# Patient Record
Sex: FEMALE | Race: WHITE | NOT HISPANIC OR LATINO | Employment: OTHER | URBAN - METROPOLITAN AREA
[De-identification: names, ages, dates, MRNs, and addresses within clinical notes are randomized per-mention and may not be internally consistent; named-entity substitution may affect disease eponyms.]

---

## 2016-01-12 LAB — CRC RECOMMENDATION EXT: NORMAL

## 2020-06-23 PROBLEM — E53.8 B12 DEFICIENCY: Status: ACTIVE | Noted: 2020-06-23

## 2020-06-23 PROBLEM — E78.2 MIXED DIABETIC HYPERLIPIDEMIA ASSOCIATED WITH TYPE 2 DIABETES MELLITUS: Status: ACTIVE | Noted: 2020-06-23

## 2020-06-23 PROBLEM — E11.69 MIXED DIABETIC HYPERLIPIDEMIA ASSOCIATED WITH TYPE 2 DIABETES MELLITUS: Status: ACTIVE | Noted: 2020-06-23

## 2020-06-23 PROBLEM — Z79.4 TYPE 2 DIABETES MELLITUS WITH HYPERGLYCEMIA, WITH LONG-TERM CURRENT USE OF INSULIN: Status: ACTIVE | Noted: 2020-06-23

## 2020-06-23 PROBLEM — E55.9 VITAMIN D DEFICIENCY: Status: ACTIVE | Noted: 2020-06-23

## 2020-06-23 PROBLEM — E11.65 TYPE 2 DIABETES MELLITUS WITH HYPERGLYCEMIA, WITH LONG-TERM CURRENT USE OF INSULIN: Status: ACTIVE | Noted: 2020-06-23

## 2020-06-23 PROBLEM — E05.00 GRAVES' DISEASE IN REMISSION: Status: ACTIVE | Noted: 2020-06-23

## 2020-06-23 PROBLEM — I10 ESSENTIAL HYPERTENSION: Status: ACTIVE | Noted: 2020-06-23

## 2021-09-19 ENCOUNTER — HOSPITAL ENCOUNTER (EMERGENCY)
Facility: HOSPITAL | Age: 66
Discharge: HOME | End: 2021-09-19
Attending: FAMILY MEDICINE
Payer: MEDICAID

## 2021-09-19 VITALS
BODY MASS INDEX: 32.95 KG/M2 | DIASTOLIC BLOOD PRESSURE: 61 MMHG | HEIGHT: 66 IN | HEART RATE: 65 BPM | OXYGEN SATURATION: 95 % | SYSTOLIC BLOOD PRESSURE: 132 MMHG | WEIGHT: 205 LBS | RESPIRATION RATE: 20 BRPM | TEMPERATURE: 99 F

## 2021-09-19 DIAGNOSIS — R55 NEAR SYNCOPE: ICD-10-CM

## 2021-09-19 DIAGNOSIS — M54.41 ACUTE RIGHT-SIDED LOW BACK PAIN WITH RIGHT-SIDED SCIATICA: Primary | ICD-10-CM

## 2021-09-19 PROCEDURE — 72100 XR LUMBAR SPINE AP AND LATERAL: ICD-10-PCS | Mod: 26,,, | Performed by: RADIOLOGY

## 2021-09-19 PROCEDURE — 93010 EKG 12-LEAD: ICD-10-PCS | Mod: ,,, | Performed by: INTERNAL MEDICINE

## 2021-09-19 PROCEDURE — 72100 X-RAY EXAM L-S SPINE 2/3 VWS: CPT | Mod: 26,,, | Performed by: RADIOLOGY

## 2021-09-19 PROCEDURE — 82962 GLUCOSE BLOOD TEST: CPT

## 2021-09-19 PROCEDURE — 25000003 PHARM REV CODE 250: Performed by: FAMILY MEDICINE

## 2021-09-19 PROCEDURE — 72100 X-RAY EXAM L-S SPINE 2/3 VWS: CPT | Mod: TC,FY

## 2021-09-19 PROCEDURE — 99284 EMERGENCY DEPT VISIT MOD MDM: CPT | Mod: 25

## 2021-09-19 PROCEDURE — 93005 ELECTROCARDIOGRAM TRACING: CPT

## 2021-09-19 PROCEDURE — 93010 ELECTROCARDIOGRAM REPORT: CPT | Mod: ,,, | Performed by: INTERNAL MEDICINE

## 2021-09-19 RX ORDER — TRAMADOL HYDROCHLORIDE 50 MG/1
100 TABLET ORAL EVERY 8 HOURS PRN
Qty: 18 TABLET | Refills: 0 | Status: SHIPPED | OUTPATIENT
Start: 2021-09-19 | End: 2022-09-10

## 2021-09-19 RX ORDER — TRAMADOL HYDROCHLORIDE 50 MG/1
100 TABLET ORAL
Status: COMPLETED | OUTPATIENT
Start: 2021-09-19 | End: 2021-09-19

## 2021-09-19 RX ADMIN — TRAMADOL HYDROCHLORIDE 100 MG: 50 TABLET, FILM COATED ORAL at 02:09

## 2021-09-20 LAB — POCT GLUCOSE: 101 MG/DL (ref 70–110)

## 2021-10-08 ENCOUNTER — HOSPITAL ENCOUNTER (EMERGENCY)
Facility: HOSPITAL | Age: 66
Discharge: HOME | End: 2021-10-08
Attending: FAMILY MEDICINE
Payer: MEDICARE

## 2021-10-08 VITALS
BODY MASS INDEX: 32.18 KG/M2 | HEIGHT: 67 IN | OXYGEN SATURATION: 98 % | RESPIRATION RATE: 18 BRPM | TEMPERATURE: 98 F | HEART RATE: 66 BPM | WEIGHT: 205 LBS | DIASTOLIC BLOOD PRESSURE: 86 MMHG | SYSTOLIC BLOOD PRESSURE: 148 MMHG

## 2021-10-08 DIAGNOSIS — J44.1 COPD EXACERBATION: Primary | ICD-10-CM

## 2021-10-08 DIAGNOSIS — R06.02 SOB (SHORTNESS OF BREATH): ICD-10-CM

## 2021-10-08 DIAGNOSIS — R05.9 COUGH: ICD-10-CM

## 2021-10-08 DIAGNOSIS — E11.65 TYPE 2 DIABETES MELLITUS WITH HYPERGLYCEMIA, WITH LONG-TERM CURRENT USE OF INSULIN: ICD-10-CM

## 2021-10-08 DIAGNOSIS — Z79.4 TYPE 2 DIABETES MELLITUS WITH HYPERGLYCEMIA, WITH LONG-TERM CURRENT USE OF INSULIN: ICD-10-CM

## 2021-10-08 DIAGNOSIS — I10 ESSENTIAL HYPERTENSION: ICD-10-CM

## 2021-10-08 LAB
BASOPHILS # BLD AUTO: 0.05 K/UL (ref 0–0.2)
BASOPHILS NFR BLD: 0.5 % (ref 0–1.9)
DIFFERENTIAL METHOD: NORMAL
EOSINOPHIL # BLD AUTO: 0.5 K/UL (ref 0–0.5)
EOSINOPHIL NFR BLD: 4.4 % (ref 0–8)
ERYTHROCYTE [DISTWIDTH] IN BLOOD BY AUTOMATED COUNT: 13.7 % (ref 11.5–14.5)
HCT VFR BLD AUTO: 38.2 % (ref 37–48.5)
HGB BLD-MCNC: 12.4 G/DL (ref 12–16)
IMM GRANULOCYTES # BLD AUTO: 0.02 K/UL (ref 0–0.04)
IMM GRANULOCYTES NFR BLD AUTO: 0.2 % (ref 0–0.5)
LYMPHOCYTES # BLD AUTO: 3.8 K/UL (ref 1–4.8)
LYMPHOCYTES NFR BLD: 35.6 % (ref 18–48)
MCH RBC QN AUTO: 29.8 PG (ref 27–31)
MCHC RBC AUTO-ENTMCNC: 32.5 G/DL (ref 32–36)
MCV RBC AUTO: 92 FL (ref 82–98)
MONOCYTES # BLD AUTO: 0.8 K/UL (ref 0.3–1)
MONOCYTES NFR BLD: 7.6 % (ref 4–15)
NEUTROPHILS # BLD AUTO: 5.6 K/UL (ref 1.8–7.7)
NEUTROPHILS NFR BLD: 51.7 % (ref 38–73)
NRBC BLD-RTO: 0 /100 WBC
PLATELET # BLD AUTO: 320 K/UL (ref 150–450)
PMV BLD AUTO: 10.5 FL (ref 9.2–12.9)
RBC # BLD AUTO: 4.16 M/UL (ref 4–5.4)
WBC # BLD AUTO: 10.72 K/UL (ref 3.9–12.7)

## 2021-10-08 PROCEDURE — 71045 X-RAY EXAM CHEST 1 VIEW: CPT | Mod: TC,FY

## 2021-10-08 PROCEDURE — 82962 GLUCOSE BLOOD TEST: CPT | Mod: 59

## 2021-10-08 PROCEDURE — 85025 COMPLETE CBC W/AUTO DIFF WBC: CPT | Performed by: NURSE PRACTITIONER

## 2021-10-08 PROCEDURE — 25000003 PHARM REV CODE 250: Performed by: FAMILY MEDICINE

## 2021-10-08 PROCEDURE — 25000242 PHARM REV CODE 250 ALT 637 W/ HCPCS: Performed by: NURSE PRACTITIONER

## 2021-10-08 PROCEDURE — 94640 AIRWAY INHALATION TREATMENT: CPT

## 2021-10-08 PROCEDURE — 71045 X-RAY EXAM CHEST 1 VIEW: CPT | Mod: 26,,, | Performed by: RADIOLOGY

## 2021-10-08 PROCEDURE — 96374 THER/PROPH/DIAG INJ IV PUSH: CPT

## 2021-10-08 PROCEDURE — 63600175 PHARM REV CODE 636 W HCPCS: Performed by: NURSE PRACTITIONER

## 2021-10-08 PROCEDURE — 71045 XR CHEST AP PORTABLE: ICD-10-PCS | Mod: 26,,, | Performed by: RADIOLOGY

## 2021-10-08 PROCEDURE — 99284 EMERGENCY DEPT VISIT MOD MDM: CPT | Mod: 25

## 2021-10-08 RX ORDER — METHYLPREDNISOLONE SOD SUCC 125 MG
125 VIAL (EA) INJECTION
Status: COMPLETED | OUTPATIENT
Start: 2021-10-08 | End: 2021-10-08

## 2021-10-08 RX ORDER — FLUTICASONE PROPIONATE AND SALMETEROL 250; 50 UG/1; UG/1
1 POWDER RESPIRATORY (INHALATION) 2 TIMES DAILY
Qty: 60 EACH | Refills: 2 | Status: SHIPPED | OUTPATIENT
Start: 2021-10-08 | End: 2023-01-07

## 2021-10-08 RX ORDER — FLUTICASONE PROPIONATE AND SALMETEROL 250; 50 UG/1; UG/1
1 POWDER RESPIRATORY (INHALATION) 2 TIMES DAILY
Qty: 60 EACH | Refills: 2 | Status: SHIPPED | OUTPATIENT
Start: 2021-10-08 | End: 2021-10-08 | Stop reason: SDUPTHER

## 2021-10-08 RX ORDER — ALBUTEROL SULFATE 90 UG/1
1-2 AEROSOL, METERED RESPIRATORY (INHALATION) EVERY 6 HOURS PRN
Qty: 18 G | Refills: 2 | Status: SHIPPED | OUTPATIENT
Start: 2021-10-08

## 2021-10-08 RX ORDER — ALBUTEROL SULFATE 90 UG/1
1-2 AEROSOL, METERED RESPIRATORY (INHALATION) EVERY 6 HOURS PRN
Qty: 18 G | Refills: 2 | Status: SHIPPED | OUTPATIENT
Start: 2021-10-08 | End: 2021-10-08 | Stop reason: SDUPTHER

## 2021-10-08 RX ORDER — IPRATROPIUM BROMIDE AND ALBUTEROL SULFATE 2.5; .5 MG/3ML; MG/3ML
3 SOLUTION RESPIRATORY (INHALATION)
Status: COMPLETED | OUTPATIENT
Start: 2021-10-08 | End: 2021-10-08

## 2021-10-08 RX ORDER — LEVOFLOXACIN 500 MG/1
500 TABLET, FILM COATED ORAL DAILY
Qty: 9 TABLET | Refills: 0 | Status: SHIPPED | OUTPATIENT
Start: 2021-10-09 | End: 2021-10-08 | Stop reason: SDUPTHER

## 2021-10-08 RX ORDER — LEVOFLOXACIN 500 MG/1
500 TABLET, FILM COATED ORAL DAILY
Qty: 9 TABLET | Refills: 0 | Status: SHIPPED | OUTPATIENT
Start: 2021-10-09 | End: 2021-10-18

## 2021-10-08 RX ORDER — LEVOFLOXACIN 250 MG/1
500 TABLET ORAL
Status: COMPLETED | OUTPATIENT
Start: 2021-10-08 | End: 2021-10-08

## 2021-10-08 RX ADMIN — LEVOFLOXACIN 500 MG: 250 TABLET, FILM COATED ORAL at 09:10

## 2021-10-08 RX ADMIN — METHYLPREDNISOLONE SODIUM SUCCINATE 125 MG: 125 INJECTION, POWDER, FOR SOLUTION INTRAMUSCULAR; INTRAVENOUS at 09:10

## 2021-10-08 RX ADMIN — IPRATROPIUM BROMIDE AND ALBUTEROL SULFATE 3 ML: .5; 3 SOLUTION RESPIRATORY (INHALATION) at 09:10

## 2021-10-09 LAB — POCT GLUCOSE: 168 MG/DL (ref 70–110)

## 2021-11-16 ENCOUNTER — HOSPITAL ENCOUNTER (EMERGENCY)
Facility: HOSPITAL | Age: 66
Discharge: HOME | End: 2021-11-16
Payer: MEDICARE

## 2021-11-16 VITALS
HEART RATE: 70 BPM | RESPIRATION RATE: 20 BRPM | HEIGHT: 67 IN | DIASTOLIC BLOOD PRESSURE: 80 MMHG | SYSTOLIC BLOOD PRESSURE: 184 MMHG | BODY MASS INDEX: 33.74 KG/M2 | WEIGHT: 215 LBS | TEMPERATURE: 99 F | OXYGEN SATURATION: 97 %

## 2021-11-16 DIAGNOSIS — J02.0 STREP PHARYNGITIS: Primary | ICD-10-CM

## 2021-11-16 PROCEDURE — 99283 EMERGENCY DEPT VISIT LOW MDM: CPT

## 2021-11-16 RX ORDER — AMOXICILLIN 875 MG/1
875 TABLET, FILM COATED ORAL 2 TIMES DAILY
Qty: 14 TABLET | Refills: 0 | Status: ON HOLD | OUTPATIENT
Start: 2021-11-16 | End: 2022-03-31 | Stop reason: HOSPADM

## 2021-11-16 RX ORDER — AMOXICILLIN 875 MG/1
875 TABLET, FILM COATED ORAL 2 TIMES DAILY
Qty: 14 TABLET | Refills: 0 | Status: SHIPPED | OUTPATIENT
Start: 2021-11-16 | End: 2021-11-16 | Stop reason: SDUPTHER

## 2022-03-25 ENCOUNTER — HOSPITAL ENCOUNTER (INPATIENT)
Facility: HOSPITAL | Age: 67
LOS: 5 days | Discharge: HOME HEALTH | DRG: 177 | End: 2022-03-31
Attending: EMERGENCY MEDICINE | Admitting: FAMILY MEDICINE
Payer: MEDICARE

## 2022-03-25 DIAGNOSIS — I49.3 PVC (PREMATURE VENTRICULAR CONTRACTION): ICD-10-CM

## 2022-03-25 DIAGNOSIS — J96.01 ACUTE RESPIRATORY FAILURE WITH HYPOXIA: Primary | ICD-10-CM

## 2022-03-25 DIAGNOSIS — I48.91 ATRIAL FIBRILLATION: ICD-10-CM

## 2022-03-25 DIAGNOSIS — I48.91 A-FIB: ICD-10-CM

## 2022-03-25 DIAGNOSIS — R06.02 SOB (SHORTNESS OF BREATH): ICD-10-CM

## 2022-03-25 DIAGNOSIS — E11.65 TYPE 2 DIABETES MELLITUS WITH HYPERGLYCEMIA, WITH LONG-TERM CURRENT USE OF INSULIN: ICD-10-CM

## 2022-03-25 DIAGNOSIS — J18.9 COMMUNITY ACQUIRED PNEUMONIA OF RIGHT LUNG, UNSPECIFIED PART OF LUNG: ICD-10-CM

## 2022-03-25 DIAGNOSIS — R07.9 CHEST PAIN: ICD-10-CM

## 2022-03-25 DIAGNOSIS — I48.0 PAF (PAROXYSMAL ATRIAL FIBRILLATION): ICD-10-CM

## 2022-03-25 DIAGNOSIS — Z79.4 TYPE 2 DIABETES MELLITUS WITH HYPERGLYCEMIA, WITH LONG-TERM CURRENT USE OF INSULIN: ICD-10-CM

## 2022-03-25 LAB
ALBUMIN SERPL BCP-MCNC: 3.4 G/DL (ref 3.5–5.2)
ALP SERPL-CCNC: 63 U/L (ref 55–135)
ALT SERPL W/O P-5'-P-CCNC: 18 U/L (ref 10–44)
ANION GAP SERPL CALC-SCNC: 11 MMOL/L (ref 8–16)
AST SERPL-CCNC: 19 U/L (ref 10–40)
BACTERIA #/AREA URNS HPF: ABNORMAL /HPF
BASOPHILS # BLD AUTO: 0.04 K/UL (ref 0–0.2)
BASOPHILS NFR BLD: 0.3 % (ref 0–1.9)
BILIRUB SERPL-MCNC: 0.8 MG/DL (ref 0.1–1)
BILIRUB UR QL STRIP: NEGATIVE
BNP SERPL-MCNC: 63 PG/ML (ref 0–99)
BUN SERPL-MCNC: 17 MG/DL (ref 8–23)
CALCIUM SERPL-MCNC: 9.1 MG/DL (ref 8.7–10.5)
CHLORIDE SERPL-SCNC: 105 MMOL/L (ref 95–110)
CLARITY UR: CLEAR
CO2 SERPL-SCNC: 24 MMOL/L (ref 23–29)
COLOR UR: YELLOW
CREAT SERPL-MCNC: 0.9 MG/DL (ref 0.5–1.4)
DIFFERENTIAL METHOD: ABNORMAL
EOSINOPHIL # BLD AUTO: 0.3 K/UL (ref 0–0.5)
EOSINOPHIL NFR BLD: 2.5 % (ref 0–8)
ERYTHROCYTE [DISTWIDTH] IN BLOOD BY AUTOMATED COUNT: 14.2 % (ref 11.5–14.5)
EST. GFR  (AFRICAN AMERICAN): >60 ML/MIN/1.73 M^2
EST. GFR  (NON AFRICAN AMERICAN): >60 ML/MIN/1.73 M^2
GLUCOSE SERPL-MCNC: 144 MG/DL (ref 70–110)
GLUCOSE UR QL STRIP: NEGATIVE
HCT VFR BLD AUTO: 38.3 % (ref 37–48.5)
HGB BLD-MCNC: 12.5 G/DL (ref 12–16)
HGB UR QL STRIP: NEGATIVE
HYALINE CASTS #/AREA URNS LPF: 0 /LPF
IMM GRANULOCYTES # BLD AUTO: 0.08 K/UL (ref 0–0.04)
IMM GRANULOCYTES NFR BLD AUTO: 0.6 % (ref 0–0.5)
INFLUENZA A, MOLECULAR: NEGATIVE
INFLUENZA B, MOLECULAR: NEGATIVE
KETONES UR QL STRIP: NEGATIVE
LACTATE SERPL-SCNC: 1.4 MMOL/L (ref 0.5–2.2)
LEUKOCYTE ESTERASE UR QL STRIP: NEGATIVE
LYMPHOCYTES # BLD AUTO: 1.4 K/UL (ref 1–4.8)
LYMPHOCYTES NFR BLD: 10.8 % (ref 18–48)
MCH RBC QN AUTO: 29.9 PG (ref 27–31)
MCHC RBC AUTO-ENTMCNC: 32.6 G/DL (ref 32–36)
MCV RBC AUTO: 92 FL (ref 82–98)
MICROSCOPIC COMMENT: ABNORMAL
MONOCYTES # BLD AUTO: 1.1 K/UL (ref 0.3–1)
MONOCYTES NFR BLD: 8 % (ref 4–15)
NEUTROPHILS # BLD AUTO: 10.2 K/UL (ref 1.8–7.7)
NEUTROPHILS NFR BLD: 77.8 % (ref 38–73)
NITRITE UR QL STRIP: NEGATIVE
NRBC BLD-RTO: 0 /100 WBC
PH UR STRIP: 7 [PH] (ref 5–8)
PLATELET # BLD AUTO: 232 K/UL (ref 150–450)
PMV BLD AUTO: 10.4 FL (ref 9.2–12.9)
POCT GLUCOSE: 222 MG/DL (ref 70–110)
POTASSIUM SERPL-SCNC: 3.6 MMOL/L (ref 3.5–5.1)
PROCALCITONIN SERPL IA-MCNC: 0.12 NG/ML
PROT SERPL-MCNC: 7.6 G/DL (ref 6–8.4)
PROT UR QL STRIP: ABNORMAL
RBC # BLD AUTO: 4.18 M/UL (ref 4–5.4)
RBC #/AREA URNS HPF: 0 /HPF (ref 0–4)
SARS-COV-2 RDRP RESP QL NAA+PROBE: NEGATIVE
SODIUM SERPL-SCNC: 140 MMOL/L (ref 136–145)
SP GR UR STRIP: 1.02 (ref 1–1.03)
SPECIMEN SOURCE: NORMAL
URN SPEC COLLECT METH UR: ABNORMAL
UROBILINOGEN UR STRIP-ACNC: 1 EU/DL
WBC # BLD AUTO: 13.08 K/UL (ref 3.9–12.7)
WBC #/AREA URNS HPF: 2 /HPF (ref 0–5)

## 2022-03-25 PROCEDURE — 96372 THER/PROPH/DIAG INJ SC/IM: CPT | Performed by: FAMILY MEDICINE

## 2022-03-25 PROCEDURE — 94640 AIRWAY INHALATION TREATMENT: CPT

## 2022-03-25 PROCEDURE — 36415 COLL VENOUS BLD VENIPUNCTURE: CPT | Performed by: EMERGENCY MEDICINE

## 2022-03-25 PROCEDURE — 25000003 PHARM REV CODE 250: Performed by: EMERGENCY MEDICINE

## 2022-03-25 PROCEDURE — 27000221 HC OXYGEN, UP TO 24 HOURS

## 2022-03-25 PROCEDURE — 87186 SC STD MICRODIL/AGAR DIL: CPT | Performed by: FAMILY MEDICINE

## 2022-03-25 PROCEDURE — 87040 BLOOD CULTURE FOR BACTERIA: CPT | Performed by: EMERGENCY MEDICINE

## 2022-03-25 PROCEDURE — 71045 XR CHEST AP PORTABLE: ICD-10-PCS | Mod: 26,,, | Performed by: RADIOLOGY

## 2022-03-25 PROCEDURE — 63600175 PHARM REV CODE 636 W HCPCS: Performed by: EMERGENCY MEDICINE

## 2022-03-25 PROCEDURE — 84145 PROCALCITONIN (PCT): CPT | Performed by: EMERGENCY MEDICINE

## 2022-03-25 PROCEDURE — 99220 PR INITIAL OBSERVATION CARE,LEVL III: ICD-10-PCS | Mod: ,,, | Performed by: FAMILY MEDICINE

## 2022-03-25 PROCEDURE — 25000242 PHARM REV CODE 250 ALT 637 W/ HCPCS: Performed by: EMERGENCY MEDICINE

## 2022-03-25 PROCEDURE — 93010 EKG 12-LEAD: ICD-10-PCS | Mod: ,,, | Performed by: INTERNAL MEDICINE

## 2022-03-25 PROCEDURE — 87077 CULTURE AEROBIC IDENTIFY: CPT | Performed by: FAMILY MEDICINE

## 2022-03-25 PROCEDURE — 96375 TX/PRO/DX INJ NEW DRUG ADDON: CPT

## 2022-03-25 PROCEDURE — 87502 INFLUENZA DNA AMP PROBE: CPT | Performed by: EMERGENCY MEDICINE

## 2022-03-25 PROCEDURE — 96374 THER/PROPH/DIAG INJ IV PUSH: CPT

## 2022-03-25 PROCEDURE — C9399 UNCLASSIFIED DRUGS OR BIOLOG: HCPCS | Performed by: FAMILY MEDICINE

## 2022-03-25 PROCEDURE — 87070 CULTURE OTHR SPECIMN AEROBIC: CPT | Performed by: FAMILY MEDICINE

## 2022-03-25 PROCEDURE — 93005 ELECTROCARDIOGRAM TRACING: CPT

## 2022-03-25 PROCEDURE — 25000242 PHARM REV CODE 250 ALT 637 W/ HCPCS: Performed by: FAMILY MEDICINE

## 2022-03-25 PROCEDURE — 99285 EMERGENCY DEPT VISIT HI MDM: CPT | Mod: 25

## 2022-03-25 PROCEDURE — 81000 URINALYSIS NONAUTO W/SCOPE: CPT | Performed by: FAMILY MEDICINE

## 2022-03-25 PROCEDURE — U0002 COVID-19 LAB TEST NON-CDC: HCPCS | Performed by: EMERGENCY MEDICINE

## 2022-03-25 PROCEDURE — 71045 X-RAY EXAM CHEST 1 VIEW: CPT | Mod: 26,,, | Performed by: RADIOLOGY

## 2022-03-25 PROCEDURE — 85025 COMPLETE CBC W/AUTO DIFF WBC: CPT | Performed by: EMERGENCY MEDICINE

## 2022-03-25 PROCEDURE — 71045 X-RAY EXAM CHEST 1 VIEW: CPT | Mod: TC,FY

## 2022-03-25 PROCEDURE — 94761 N-INVAS EAR/PLS OXIMETRY MLT: CPT

## 2022-03-25 PROCEDURE — 83880 ASSAY OF NATRIURETIC PEPTIDE: CPT | Performed by: EMERGENCY MEDICINE

## 2022-03-25 PROCEDURE — 25000003 PHARM REV CODE 250: Performed by: FAMILY MEDICINE

## 2022-03-25 PROCEDURE — 99220 PR INITIAL OBSERVATION CARE,LEVL III: CPT | Mod: ,,, | Performed by: FAMILY MEDICINE

## 2022-03-25 PROCEDURE — 80053 COMPREHEN METABOLIC PANEL: CPT | Performed by: EMERGENCY MEDICINE

## 2022-03-25 PROCEDURE — 63600175 PHARM REV CODE 636 W HCPCS: Performed by: FAMILY MEDICINE

## 2022-03-25 PROCEDURE — G0378 HOSPITAL OBSERVATION PER HR: HCPCS

## 2022-03-25 PROCEDURE — 99900035 HC TECH TIME PER 15 MIN (STAT)

## 2022-03-25 PROCEDURE — 93010 ELECTROCARDIOGRAM REPORT: CPT | Mod: ,,, | Performed by: INTERNAL MEDICINE

## 2022-03-25 PROCEDURE — 83605 ASSAY OF LACTIC ACID: CPT | Performed by: EMERGENCY MEDICINE

## 2022-03-25 PROCEDURE — 87205 SMEAR GRAM STAIN: CPT | Performed by: FAMILY MEDICINE

## 2022-03-25 RX ORDER — SODIUM CHLORIDE 0.9 % (FLUSH) 0.9 %
10 SYRINGE (ML) INJECTION EVERY 8 HOURS PRN
Status: DISCONTINUED | OUTPATIENT
Start: 2022-03-25 | End: 2022-03-31 | Stop reason: HOSPADM

## 2022-03-25 RX ORDER — HYDROCODONE BITARTRATE AND ACETAMINOPHEN 5; 325 MG/1; MG/1
1 TABLET ORAL EVERY 6 HOURS PRN
Status: DISCONTINUED | OUTPATIENT
Start: 2022-03-25 | End: 2022-03-31 | Stop reason: HOSPADM

## 2022-03-25 RX ORDER — POLYETHYLENE GLYCOL 3350 17 G/17G
17 POWDER, FOR SOLUTION ORAL DAILY PRN
Status: DISCONTINUED | OUTPATIENT
Start: 2022-03-25 | End: 2022-03-31 | Stop reason: HOSPADM

## 2022-03-25 RX ORDER — ONDANSETRON 2 MG/ML
4 INJECTION INTRAMUSCULAR; INTRAVENOUS
Status: COMPLETED | OUTPATIENT
Start: 2022-03-25 | End: 2022-03-25

## 2022-03-25 RX ORDER — IPRATROPIUM BROMIDE AND ALBUTEROL SULFATE 2.5; .5 MG/3ML; MG/3ML
3 SOLUTION RESPIRATORY (INHALATION) EVERY 6 HOURS PRN
Status: DISCONTINUED | OUTPATIENT
Start: 2022-03-25 | End: 2022-03-31 | Stop reason: HOSPADM

## 2022-03-25 RX ORDER — PROMETHAZINE HYDROCHLORIDE 12.5 MG/1
25 TABLET ORAL EVERY 6 HOURS PRN
Status: DISCONTINUED | OUTPATIENT
Start: 2022-03-25 | End: 2022-03-31 | Stop reason: HOSPADM

## 2022-03-25 RX ORDER — NAPROXEN SODIUM 220 MG/1
81 TABLET, FILM COATED ORAL DAILY
Status: DISCONTINUED | OUTPATIENT
Start: 2022-03-25 | End: 2022-03-31 | Stop reason: HOSPADM

## 2022-03-25 RX ORDER — IBUPROFEN 200 MG
24 TABLET ORAL
Status: DISCONTINUED | OUTPATIENT
Start: 2022-03-25 | End: 2022-03-31 | Stop reason: HOSPADM

## 2022-03-25 RX ORDER — SODIUM,POTASSIUM PHOSPHATES 280-250MG
2 POWDER IN PACKET (EA) ORAL
Status: DISCONTINUED | OUTPATIENT
Start: 2022-03-25 | End: 2022-03-31 | Stop reason: HOSPADM

## 2022-03-25 RX ORDER — CARVEDILOL 12.5 MG/1
12.5 TABLET ORAL 2 TIMES DAILY WITH MEALS
Status: DISCONTINUED | OUTPATIENT
Start: 2022-03-25 | End: 2022-03-30

## 2022-03-25 RX ORDER — OXYBUTYNIN CHLORIDE 5 MG/1
5 TABLET ORAL 2 TIMES DAILY
Status: DISCONTINUED | OUTPATIENT
Start: 2022-03-25 | End: 2022-03-31 | Stop reason: HOSPADM

## 2022-03-25 RX ORDER — GLUCAGON 1 MG
1 KIT INJECTION
Status: DISCONTINUED | OUTPATIENT
Start: 2022-03-25 | End: 2022-03-31 | Stop reason: HOSPADM

## 2022-03-25 RX ORDER — LANOLIN ALCOHOL/MO/W.PET/CERES
800 CREAM (GRAM) TOPICAL
Status: DISCONTINUED | OUTPATIENT
Start: 2022-03-25 | End: 2022-03-31 | Stop reason: HOSPADM

## 2022-03-25 RX ORDER — ACETAMINOPHEN 325 MG/1
650 TABLET ORAL EVERY 4 HOURS PRN
Status: DISCONTINUED | OUTPATIENT
Start: 2022-03-25 | End: 2022-03-31 | Stop reason: HOSPADM

## 2022-03-25 RX ORDER — LEVOFLOXACIN 5 MG/ML
750 INJECTION, SOLUTION INTRAVENOUS
Status: DISCONTINUED | OUTPATIENT
Start: 2022-03-25 | End: 2022-03-30

## 2022-03-25 RX ORDER — GABAPENTIN 300 MG/1
600 CAPSULE ORAL 2 TIMES DAILY
Status: DISCONTINUED | OUTPATIENT
Start: 2022-03-25 | End: 2022-03-31 | Stop reason: HOSPADM

## 2022-03-25 RX ORDER — LISINOPRIL 10 MG/1
20 TABLET ORAL DAILY
Status: DISCONTINUED | OUTPATIENT
Start: 2022-03-25 | End: 2022-03-31 | Stop reason: HOSPADM

## 2022-03-25 RX ORDER — ACETAMINOPHEN 325 MG/1
650 TABLET ORAL EVERY 8 HOURS PRN
Status: DISCONTINUED | OUTPATIENT
Start: 2022-03-25 | End: 2022-03-31 | Stop reason: HOSPADM

## 2022-03-25 RX ORDER — FLUTICASONE FUROATE AND VILANTEROL 100; 25 UG/1; UG/1
1 POWDER RESPIRATORY (INHALATION) DAILY
Status: DISCONTINUED | OUTPATIENT
Start: 2022-03-25 | End: 2022-03-31 | Stop reason: HOSPADM

## 2022-03-25 RX ORDER — ONDANSETRON 2 MG/ML
4 INJECTION INTRAMUSCULAR; INTRAVENOUS EVERY 8 HOURS PRN
Status: DISCONTINUED | OUTPATIENT
Start: 2022-03-25 | End: 2022-03-31 | Stop reason: HOSPADM

## 2022-03-25 RX ORDER — INSULIN ASPART 100 [IU]/ML
1-10 INJECTION, SOLUTION INTRAVENOUS; SUBCUTANEOUS
Status: DISCONTINUED | OUTPATIENT
Start: 2022-03-25 | End: 2022-03-31 | Stop reason: HOSPADM

## 2022-03-25 RX ORDER — HEPARIN SODIUM 5000 [USP'U]/ML
5000 INJECTION, SOLUTION INTRAVENOUS; SUBCUTANEOUS EVERY 8 HOURS
Status: DISCONTINUED | OUTPATIENT
Start: 2022-03-25 | End: 2022-03-30

## 2022-03-25 RX ORDER — ACETAMINOPHEN 325 MG/1
650 TABLET ORAL
Status: COMPLETED | OUTPATIENT
Start: 2022-03-25 | End: 2022-03-25

## 2022-03-25 RX ORDER — NALOXONE HCL 0.4 MG/ML
0.02 VIAL (ML) INJECTION
Status: DISCONTINUED | OUTPATIENT
Start: 2022-03-25 | End: 2022-03-31 | Stop reason: HOSPADM

## 2022-03-25 RX ORDER — IPRATROPIUM BROMIDE AND ALBUTEROL SULFATE 2.5; .5 MG/3ML; MG/3ML
3 SOLUTION RESPIRATORY (INHALATION)
Status: COMPLETED | OUTPATIENT
Start: 2022-03-25 | End: 2022-03-25

## 2022-03-25 RX ORDER — ATORVASTATIN CALCIUM 10 MG/1
20 TABLET, FILM COATED ORAL DAILY
Status: DISCONTINUED | OUTPATIENT
Start: 2022-03-25 | End: 2022-03-31 | Stop reason: HOSPADM

## 2022-03-25 RX ORDER — BUSPIRONE HYDROCHLORIDE 5 MG/1
15 TABLET ORAL DAILY
Status: DISCONTINUED | OUTPATIENT
Start: 2022-03-25 | End: 2022-03-31 | Stop reason: HOSPADM

## 2022-03-25 RX ORDER — IBUPROFEN 200 MG
16 TABLET ORAL
Status: DISCONTINUED | OUTPATIENT
Start: 2022-03-25 | End: 2022-03-31 | Stop reason: HOSPADM

## 2022-03-25 RX ADMIN — ASPIRIN 81 MG: 81 TABLET, CHEWABLE ORAL at 02:03

## 2022-03-25 RX ADMIN — IPRATROPIUM BROMIDE AND ALBUTEROL SULFATE 3 ML: 2.5; .5 SOLUTION RESPIRATORY (INHALATION) at 11:03

## 2022-03-25 RX ADMIN — OXYBUTYNIN CHLORIDE 5 MG: 5 TABLET ORAL at 08:03

## 2022-03-25 RX ADMIN — ONDANSETRON 4 MG: 2 INJECTION INTRAMUSCULAR; INTRAVENOUS at 11:03

## 2022-03-25 RX ADMIN — IPRATROPIUM BROMIDE AND ALBUTEROL SULFATE 3 ML: 2.5; .5 SOLUTION RESPIRATORY (INHALATION) at 07:03

## 2022-03-25 RX ADMIN — ACETAMINOPHEN 650 MG: 325 TABLET ORAL at 12:03

## 2022-03-25 RX ADMIN — GABAPENTIN 600 MG: 300 CAPSULE ORAL at 08:03

## 2022-03-25 RX ADMIN — BUSPIRONE HYDROCHLORIDE 15 MG: 5 TABLET ORAL at 02:03

## 2022-03-25 RX ADMIN — HEPARIN SODIUM 5000 UNITS: 5000 INJECTION, SOLUTION INTRAVENOUS; SUBCUTANEOUS at 02:03

## 2022-03-25 RX ADMIN — INSULIN DETEMIR 30 UNITS: 100 INJECTION, SOLUTION SUBCUTANEOUS at 08:03

## 2022-03-25 RX ADMIN — ATORVASTATIN CALCIUM 20 MG: 10 TABLET, FILM COATED ORAL at 02:03

## 2022-03-25 RX ADMIN — HEPARIN SODIUM 5000 UNITS: 5000 INJECTION, SOLUTION INTRAVENOUS; SUBCUTANEOUS at 09:03

## 2022-03-25 RX ADMIN — LEVOFLOXACIN 750 MG: 750 INJECTION, SOLUTION INTRAVENOUS at 12:03

## 2022-03-25 RX ADMIN — INSULIN ASPART 4 UNITS: 100 INJECTION, SOLUTION INTRAVENOUS; SUBCUTANEOUS at 04:03

## 2022-03-25 RX ADMIN — IPRATROPIUM BROMIDE AND ALBUTEROL SULFATE 3 ML: 2.5; .5 SOLUTION RESPIRATORY (INHALATION) at 04:03

## 2022-03-25 RX ADMIN — HYDROCODONE BITARTRATE AND ACETAMINOPHEN 1 TABLET: 5; 325 TABLET ORAL at 03:03

## 2022-03-25 RX ADMIN — LISINOPRIL 20 MG: 10 TABLET ORAL at 02:03

## 2022-03-25 RX ADMIN — CARVEDILOL 12.5 MG: 12.5 TABLET, FILM COATED ORAL at 04:03

## 2022-03-25 NOTE — ASSESSMENT & PLAN NOTE
Patient's FSGs are uncontrolled due to hyperglycemia on current medication regimen.  Last A1c reviewed-   Lab Results   Component Value Date    HGBA1C 8.3 (H) 01/14/2021     Most recent fingerstick glucose reviewed- No results for input(s): POCTGLUCOSE in the last 24 hours.  Current correctional scale  Medium  Decrease anti-hyperglycemic dose as follows- decrease basal insulin by 50% initially  Antihyperglycemics (From admission, onward)            Start     Stop Route Frequency Ordered    03/25/22 2100  insulin detemir U-100 pen 30 Units         -- SubQ Nightly 03/25/22 1410    03/25/22 1414  insulin aspart U-100 pen 1-10 Units         -- SubQ Before meals & nightly PRN 03/25/22 1410        Hold Oral hypoglycemics while patient is in the hospital.

## 2022-03-25 NOTE — ASSESSMENT & PLAN NOTE
Chronic, uncontrolled.  Latest blood pressure and vitals reviewed-   Temp:  [98.7 °F (37.1 °C)-101.7 °F (38.7 °C)]   Pulse:  [81-90]   Resp:  [14-22]   BP: (125-180)/(55-64)   SpO2:  [91 %-95 %] .   Home meds for hypertension were reviewed and noted below.   Hypertension Medications             amLODIPine (NORVASC) 10 MG tablet Take 1 tablet (10 mg total) by mouth once daily.    carvedilol (COREG) 12.5 MG tablet TK 1 T PO BID    furosemide (LASIX) 20 MG tablet Take 20 mg by mouth 2 (two) times daily.    lisinopril (PRINIVIL,ZESTRIL) 20 MG tablet Take 20 mg by mouth once daily.    metoprolol succinate (TOPROL-XL) 50 MG 24 hr tablet Take 50 mg by mouth once daily.    propranolol (INDERAL LA) 160 mg 24 hr capsule     triamterene-hydrochlorothiazide 37.5-25 mg (DYAZIDE) 37.5-25 mg per capsule Take 1 capsule by mouth every morning.          While in the hospital, will manage blood pressure as follows; resume home blood pressure medications    Will utilize p.r.n. blood pressure medication only if patient's blood pressure greater than  180/110 and she develops symptoms such as worsening chest pain or shortness of breath.

## 2022-03-25 NOTE — ED TRIAGE NOTES
Patient presents to ED via [facility] with c/o SOB and cough since last night. Upon [facility] arrival the pt was 88% on room air. She does not wear O2 at home. O2 was started by [facility] and she is 91% on 3 liters at this time. Pt is AAOx4. Skin hot, dry to touch. Respirations are mildly labored. Pt reports that she feels a little better on the oxygen.

## 2022-03-25 NOTE — H&P
[facility] Medicine  History & Physical    Patient Name: Syl Matos  MRN: [id number]  Patient Class: OP- Observation  Admission Date: 3/25/2022  Attending Physician: Lexy Kimbrough MD   Primary Care Provider: Luis Diaz MD         Patient information was obtained from patient, relative(s) and ER records.     Subjective:     Principal Problem:Pneumonia of right lower lobe due to infectious organism    Chief Complaint:   Chief Complaint   Patient presents with    Cough    Shortness of Breath    Fever        HPI: Patient is a 66-year-old female with past medical history of diabetes, hyperlipidemia, hypertension who presents to the ER with complaints of fever, cough, shortness of breath over the last several days that worsened last night.  Her sister has also been sick with similar symptoms.  Symptoms started with sneezing, sore throat and seemed to progress with shortness of breath and a deep productive cough.  She came to the ER today because of fever and worsening lethargy, cough.  In the ER, she was febrile to 101.4, tachypneic and hypoxic requiring supplemental oxygen.  Chest x-ray showed right lower lobe pneumonia.  Hospital team called for admission.      Past Medical History:   Diagnosis Date    Diabetes mellitus     Hypercholesteremia     Hypertension        Past Surgical History:   Procedure Laterality Date    FRACTURE SURGERY      pelvis surgery    TUBAL LIGATION         Review of patient's allergies indicates:   Allergen Reactions    Iodine and iodide containing products Hives       No current facility-administered medications on file prior to encounter.     Current Outpatient Medications on File Prior to Encounter   Medication Sig    albuterol (PROVENTIL/VENTOLIN HFA) 90 mcg/actuation inhaler Inhale 1-2 puffs into the lungs every 6 (six) hours as needed for Wheezing or Shortness of Breath. Rescue    amLODIPine (NORVASC) 10 MG tablet Take 1 tablet (10 mg total) by  "mouth once daily.    amoxicillin (AMOXIL) 875 MG tablet Take 1 tablet (875 mg total) by mouth 2 (two) times daily.    aspirin 81 MG Chew Take 81 mg by mouth once daily.    busPIRone (BUSPAR) 15 MG tablet Take 15 mg by mouth once daily.     carvedilol (COREG) 12.5 MG tablet TK 1 T PO BID    cholecalciferol, vitamin D3, (VITAMIN D3) 50 mcg (2,000 unit) Tab Take 1 tablet (2,000 Units total) by mouth once daily.    cyanocobalamin 1,000 mcg/mL injection INJECT 1ML (1000MCG TOTAL) INTO THE MUSCLE EVERY 28 DAYS.    FARXIGA 5 mg Tab tablet TAKE 1 TABLET BY MOUTH ONCE DAILY    fluticasone-salmeterol diskus inhaler 250-50 mcg Inhale 1 puff into the lungs 2 (two) times daily. Controller    furosemide (LASIX) 20 MG tablet Take 20 mg by mouth 2 (two) times daily.    gabapentin (NEURONTIN) 300 MG capsule Take 600 mg by mouth 2 (two) times daily.    HYDROcodone-acetaminophen (NORCO)  mg per tablet     insulin (LANTUS SOLOSTAR U-100 INSULIN) glargine 100 units/mL (3mL) SubQ pen Inject 60 Units into the skin every evening.    insulin lispro 100 unit/mL injection Inject 14 Units into the skin 3 (three) times daily with meals.    lisinopril (PRINIVIL,ZESTRIL) 20 MG tablet Take 20 mg by mouth once daily.    loratadine (CLARITIN) 10 mg tablet     magnesium oxide (MAG-OX) 400 mg tablet Take 400 mg by mouth once daily.    metFORMIN (GLUCOPHAGE) 1000 MG tablet Take 1 tablet (1,000 mg total) by mouth 2 (two) times daily.    metoprolol succinate (TOPROL-XL) 50 MG 24 hr tablet Take 50 mg by mouth once daily.    NOVOFINE 32 32 x 1/4 " Ndle     omega-3 fatty acids 1,000 mg Cap Take 2 capsules (2,000 mg total) by mouth 2 (two) times daily. Take as directed for triglycerides and to decrease risk of heart disease and stroke.    oxybutynin (DITROPAN) 5 MG Tab TK 1 T PO BID    propranolol (INDERAL LA) 160 mg 24 hr capsule     simvastatin (ZOCOR) 40 MG tablet TAKE 1 TABLET BY MOUTH EVERY MORNING.    SITagliptin (JANUVIA) " 100 MG Tab Take 100 mg by mouth once daily.    traMADoL (ULTRAM) 50 mg tablet Take 2 tablets (100 mg total) by mouth every 8 (eight) hours as needed for Pain.    triamterene-hydrochlorothiazide 37.5-25 mg (DYAZIDE) 37.5-25 mg per capsule Take 1 capsule by mouth every morning.    VOLTAREN 1 % Gel     zolpidem (AMBIEN) 10 mg Tab Take 5 mg by mouth nightly as needed.     Family History       Problem Relation (Age of Onset)    Heart disease Mother, Father          Tobacco Use    Smoking status: Never Smoker    Smokeless tobacco: Never Used   Substance and Sexual Activity    Alcohol use: No    Drug use: No    Sexual activity: Not on file     Review of Systems   Constitutional:  Positive for chills, fatigue and fever.   HENT:  Positive for congestion, sneezing and sore throat.    Respiratory:  Positive for cough and shortness of breath.    Cardiovascular:  Negative for chest pain.   Gastrointestinal:  Negative for abdominal pain, nausea and vomiting.   Endocrine: Negative.    Genitourinary: Negative.    Musculoskeletal: Negative.    Skin: Negative.    Allergic/Immunologic: Negative.    Neurological: Negative.    Hematological: Negative.    Psychiatric/Behavioral: Negative.     Objective:     Vital Signs (Most Recent):  Temp: 98.7 °F (37.1 °C) (03/25/22 1604)  Pulse: 89 (03/25/22 1618)  Resp: 20 (03/25/22 1604)  BP: (!) 156/58 (03/25/22 1604)  SpO2: 95 % (03/25/22 1618)   Vital Signs (24h Range):  Temp:  [98.7 °F (37.1 °C)-101.7 °F (38.7 °C)] 98.7 °F (37.1 °C)  Pulse:  [81-90] 89  Resp:  [14-22] 20  SpO2:  [91 %-95 %] 95 %  BP: (125-180)/(55-64) 156/58     Weight: 90.7 kg (200 lb)  Body mass index is 33.28 kg/m².    Physical Exam  Vitals reviewed.   Constitutional:       General: She is not in acute distress.     Appearance: She is obese. She is not toxic-appearing or diaphoretic.   HENT:      Head: Normocephalic and atraumatic.      Right Ear: External ear normal.      Left Ear: External ear normal.      Nose:  Nose normal.      Mouth/Throat:      Mouth: Mucous membranes are dry.   Eyes:      Conjunctiva/sclera: Conjunctivae normal.   Cardiovascular:      Rate and Rhythm: Normal rate and regular rhythm.      Heart sounds: No murmur heard.    No gallop.   Pulmonary:      Effort: No respiratory distress.      Breath sounds: Wheezing and rales present. No rhonchi.   Abdominal:      General: Bowel sounds are normal. There is no distension.      Tenderness: There is no abdominal tenderness.   Musculoskeletal:         General: No signs of injury.   Skin:     General: Skin is warm and dry.   Neurological:      Mental Status: She is alert. Mental status is at baseline.   Psychiatric:         Mood and Affect: Mood normal.   All pertinent labs within the past 24 hours have been reviewed.  CBC:   Recent Labs   Lab 03/25/22  1124   WBC 13.08*   HGB 12.5   HCT 38.3        CMP:   Recent Labs   Lab 03/25/22  1124      K 3.6      CO2 24   *   BUN 17   CREATININE 0.9   CALCIUM 9.1   PROT 7.6   ALBUMIN 3.4*   BILITOT 0.8   ALKPHOS 63   AST 19   ALT 18   ANIONGAP 11   EGFRNONAA >60.0           Significant Labs: All pertinent labs within the past 24 hours have been reviewed.    Significant Imaging: I have reviewed all pertinent imaging results/findings within the past 24 hours.  X-Ray Chest AP Portable   Final Result   Abnormal      1. Pulmonary hypoinflation limits evaluation   2. Increased markings within the right lower lobe suspicious for developing pulmonary infiltrate.  Correlate clinically with possible fever and/or elevated white count.   3. Suspected mild underlying congestive heart failure small bilateral pleural effusions.   Close interval follow-up is recommended.      This report was flagged in Epic as abnormal.         Electronically signed by: Dorian Wadsworth   Date:    03/25/2022   Time:    11:51            Assessment/Plan:     * Pneumonia of right lower lobe due to infectious organism  Possibly viral  vs bacterial  Febrile on admission to 101.4. lactic normal.  F/u blood cultures  Sputum cultures ordered  Will order viral resp panel if no improvement in symptoms  nichole scheduled  IV steroids  IV levaquin  Daily labs - CBC, CMP  Supplemental oxygen to maintain sats >94%      Mixed diabetic hyperlipidemia associated with type 2 diabetes mellitus  See above, continue statin therapy      Essential hypertension  Chronic, uncontrolled.  Latest blood pressure and vitals reviewed-   Temp:  [98.7 °F (37.1 °C)-101.7 °F (38.7 °C)]   Pulse:  [81-90]   Resp:  [14-22]   BP: (125-180)/(55-64)   SpO2:  [91 %-95 %] .   Home meds for hypertension were reviewed and noted below.   Hypertension Medications             amLODIPine (NORVASC) 10 MG tablet Take 1 tablet (10 mg total) by mouth once daily.    carvedilol (COREG) 12.5 MG tablet TK 1 T PO BID    furosemide (LASIX) 20 MG tablet Take 20 mg by mouth 2 (two) times daily.    lisinopril (PRINIVIL,ZESTRIL) 20 MG tablet Take 20 mg by mouth once daily.    metoprolol succinate (TOPROL-XL) 50 MG 24 hr tablet Take 50 mg by mouth once daily.    propranolol (INDERAL LA) 160 mg 24 hr capsule     triamterene-hydrochlorothiazide 37.5-25 mg (DYAZIDE) 37.5-25 mg per capsule Take 1 capsule by mouth every morning.          While in the hospital, will manage blood pressure as follows; resume home blood pressure medications    Will utilize p.r.n. blood pressure medication only if patient's blood pressure greater than  180/110 and she develops symptoms such as worsening chest pain or shortness of breath.      Type 2 diabetes mellitus with hyperglycemia, with long-term current use of insulin  Patient's FSGs are uncontrolled due to hyperglycemia on current medication regimen.  Last A1c reviewed-   Lab Results   Component Value Date    HGBA1C 8.3 (H) 01/14/2021     Most recent fingerstick glucose reviewed- No results for input(s): POCTGLUCOSE in the last 24 hours.  Current correctional scale   Medium  Decrease anti-hyperglycemic dose as follows- decrease basal insulin by 50% initially  Antihyperglycemics (From admission, onward)            Start     Stop Route Frequency Ordered    03/25/22 2100  insulin detemir U-100 pen 30 Units         -- SubQ Nightly 03/25/22 1410    03/25/22 1414  insulin aspart U-100 pen 1-10 Units         -- SubQ Before meals & nightly PRN 03/25/22 1410        Hold Oral hypoglycemics while patient is in the hospital.          VTE Risk Mitigation (From admission, onward)         Ordered     heparin (porcine) injection 5,000 Units  Every 8 hours         03/25/22 1410     IP VTE HIGH RISK PATIENT  Once         03/25/22 1410     Place sequential compression device  Until discontinued         03/25/22 1410                   Lexy Kimbrough MD  Department of [facility]   [facility]

## 2022-03-25 NOTE — SUBJECTIVE & OBJECTIVE
Past Medical History:   Diagnosis Date    Diabetes mellitus     Hypercholesteremia     Hypertension        Past Surgical History:   Procedure Laterality Date    FRACTURE SURGERY      pelvis surgery    TUBAL LIGATION         Review of patient's allergies indicates:   Allergen Reactions    Iodine and iodide containing products Hives       No current facility-administered medications on file prior to encounter.     Current Outpatient Medications on File Prior to Encounter   Medication Sig    albuterol (PROVENTIL/VENTOLIN HFA) 90 mcg/actuation inhaler Inhale 1-2 puffs into the lungs every 6 (six) hours as needed for Wheezing or Shortness of Breath. Rescue    amLODIPine (NORVASC) 10 MG tablet Take 1 tablet (10 mg total) by mouth once daily.    amoxicillin (AMOXIL) 875 MG tablet Take 1 tablet (875 mg total) by mouth 2 (two) times daily.    aspirin 81 MG Chew Take 81 mg by mouth once daily.    busPIRone (BUSPAR) 15 MG tablet Take 15 mg by mouth once daily.     carvedilol (COREG) 12.5 MG tablet TK 1 T PO BID    cholecalciferol, vitamin D3, (VITAMIN D3) 50 mcg (2,000 unit) Tab Take 1 tablet (2,000 Units total) by mouth once daily.    cyanocobalamin 1,000 mcg/mL injection INJECT 1ML (1000MCG TOTAL) INTO THE MUSCLE EVERY 28 DAYS.    FARXIGA 5 mg Tab tablet TAKE 1 TABLET BY MOUTH ONCE DAILY    fluticasone-salmeterol diskus inhaler 250-50 mcg Inhale 1 puff into the lungs 2 (two) times daily. Controller    furosemide (LASIX) 20 MG tablet Take 20 mg by mouth 2 (two) times daily.    gabapentin (NEURONTIN) 300 MG capsule Take 600 mg by mouth 2 (two) times daily.    HYDROcodone-acetaminophen (NORCO)  mg per tablet     insulin (LANTUS SOLOSTAR U-100 INSULIN) glargine 100 units/mL (3mL) SubQ pen Inject 60 Units into the skin every evening.    insulin lispro 100 unit/mL injection Inject 14 Units into the skin 3 (three) times daily with meals.    lisinopril (PRINIVIL,ZESTRIL) 20 MG tablet Take 20 mg by mouth once daily.     "loratadine (CLARITIN) 10 mg tablet     magnesium oxide (MAG-OX) 400 mg tablet Take 400 mg by mouth once daily.    metFORMIN (GLUCOPHAGE) 1000 MG tablet Take 1 tablet (1,000 mg total) by mouth 2 (two) times daily.    metoprolol succinate (TOPROL-XL) 50 MG 24 hr tablet Take 50 mg by mouth once daily.    NOVOFINE 32 32 x 1/4 " Ndle     omega-3 fatty acids 1,000 mg Cap Take 2 capsules (2,000 mg total) by mouth 2 (two) times daily. Take as directed for triglycerides and to decrease risk of heart disease and stroke.    oxybutynin (DITROPAN) 5 MG Tab TK 1 T PO BID    propranolol (INDERAL LA) 160 mg 24 hr capsule     simvastatin (ZOCOR) 40 MG tablet TAKE 1 TABLET BY MOUTH EVERY MORNING.    SITagliptin (JANUVIA) 100 MG Tab Take 100 mg by mouth once daily.    traMADoL (ULTRAM) 50 mg tablet Take 2 tablets (100 mg total) by mouth every 8 (eight) hours as needed for Pain.    triamterene-hydrochlorothiazide 37.5-25 mg (DYAZIDE) 37.5-25 mg per capsule Take 1 capsule by mouth every morning.    VOLTAREN 1 % Gel     zolpidem (AMBIEN) 10 mg Tab Take 5 mg by mouth nightly as needed.     Family History       Problem Relation (Age of Onset)    Heart disease Mother, Father          Tobacco Use    Smoking status: Never Smoker    Smokeless tobacco: Never Used   Substance and Sexual Activity    Alcohol use: No    Drug use: No    Sexual activity: Not on file     Review of Systems   Constitutional:  Positive for chills, fatigue and fever.   HENT:  Positive for congestion, sneezing and sore throat.    Respiratory:  Positive for cough and shortness of breath.    Cardiovascular:  Negative for chest pain.   Gastrointestinal:  Negative for abdominal pain, nausea and vomiting.   Endocrine: Negative.    Genitourinary: Negative.    Musculoskeletal: Negative.    Skin: Negative.    Allergic/Immunologic: Negative.    Neurological: Negative.    Hematological: Negative.    Psychiatric/Behavioral: Negative.     Objective:     Vital Signs (Most " Recent):  Temp: 98.7 °F (37.1 °C) (03/25/22 1604)  Pulse: 89 (03/25/22 1618)  Resp: 20 (03/25/22 1604)  BP: (!) 156/58 (03/25/22 1604)  SpO2: 95 % (03/25/22 1618)   Vital Signs (24h Range):  Temp:  [98.7 °F (37.1 °C)-101.7 °F (38.7 °C)] 98.7 °F (37.1 °C)  Pulse:  [81-90] 89  Resp:  [14-22] 20  SpO2:  [91 %-95 %] 95 %  BP: (125-180)/(55-64) 156/58     Weight: 90.7 kg (200 lb)  Body mass index is 33.28 kg/m².    Physical Exam  Vitals reviewed.   Constitutional:       General: She is not in acute distress.     Appearance: She is obese. She is not toxic-appearing or diaphoretic.   HENT:      Head: Normocephalic and atraumatic.      Right Ear: External ear normal.      Left Ear: External ear normal.      Nose: Nose normal.      Mouth/Throat:      Mouth: Mucous membranes are dry.   Eyes:      Conjunctiva/sclera: Conjunctivae normal.   Cardiovascular:      Rate and Rhythm: Normal rate and regular rhythm.      Heart sounds: No murmur heard.    No gallop.   Pulmonary:      Effort: No respiratory distress.      Breath sounds: Wheezing and rales present. No rhonchi.   Abdominal:      General: Bowel sounds are normal. There is no distension.      Tenderness: There is no abdominal tenderness.   Musculoskeletal:         General: No signs of injury.   Skin:     General: Skin is warm and dry.   Neurological:      Mental Status: She is alert. Mental status is at baseline.   Psychiatric:         Mood and Affect: Mood normal.   All pertinent labs within the past 24 hours have been reviewed.  CBC:   Recent Labs   Lab 03/25/22  1124   WBC 13.08*   HGB 12.5   HCT 38.3        CMP:   Recent Labs   Lab 03/25/22  1124      K 3.6      CO2 24   *   BUN 17   CREATININE 0.9   CALCIUM 9.1   PROT 7.6   ALBUMIN 3.4*   BILITOT 0.8   ALKPHOS 63   AST 19   ALT 18   ANIONGAP 11   EGFRNONAA >60.0           Significant Labs: All pertinent labs within the past 24 hours have been reviewed.    Significant Imaging: I have reviewed  all pertinent imaging results/findings within the past 24 hours.  X-Ray Chest AP Portable   Final Result   Abnormal      1. Pulmonary hypoinflation limits evaluation   2. Increased markings within the right lower lobe suspicious for developing pulmonary infiltrate.  Correlate clinically with possible fever and/or elevated white count.   3. Suspected mild underlying congestive heart failure small bilateral pleural effusions.   Close interval follow-up is recommended.      This report was flagged in Epic as abnormal.         Electronically signed by: Dorian Wadsworth   Date:    03/25/2022   Time:    11:51

## 2022-03-25 NOTE — PLAN OF CARE
Problem: Adult Inpatient Plan of Care  Goal: Plan of Care Review  Outcome: Ongoing, Progressing  Goal: Patient-Specific Goal (Individualized)  Outcome: Ongoing, Progressing  Goal: Absence of Hospital-Acquired Illness or Injury  Outcome: Ongoing, Progressing  Goal: Optimal Comfort and Wellbeing  Outcome: Ongoing, Progressing  Goal: Readiness for Transition of Care  Outcome: Ongoing, Progressing     Problem: Diabetes Comorbidity  Goal: Blood Glucose Level Within Targeted Range  Outcome: Ongoing, Progressing     Problem: Respiratory Compromise (Pneumonia)  Goal: Effective Oxygenation and Ventilation  Outcome: Ongoing, Progressing

## 2022-03-25 NOTE — ED PROVIDER NOTES
Encounter Date: 3/25/2022       History     Chief Complaint   Patient presents with    Cough    Shortness of Breath    Fever     Patient presents the ER by ambulance for evaluation of shortness of breath.  Patient has been feeling poorly for the last several days.  She called in to her primary care provider and was placed on antibiotics.  Today is her last day of antibiotics and she has not improved.  In fact, patient says she is feeling worse.  Patient notes a frequent cough it is poorly productive.  She complains of nausea but no vomiting.  She denies chest pain.  She denies diarrhea.  Patient has had no hemoptysis.  Symptoms are severe, constant, no exacerbating or alleviating factors.        Review of patient's allergies indicates:   Allergen Reactions    Iodine and iodide containing products Hives     Past Medical History:   Diagnosis Date    Diabetes mellitus     Hypercholesteremia     Hypertension      Past Surgical History:   Procedure Laterality Date    FRACTURE SURGERY      pelvis surgery    TUBAL LIGATION       Family History   Problem Relation Age of Onset    Heart disease Mother     Heart disease Father      Social History     Tobacco Use    Smoking status: Never Smoker    Smokeless tobacco: Never Used   Substance Use Topics    Alcohol use: No    Drug use: No     Review of Systems   Constitutional: Positive for fever.   HENT: Negative for sore throat.    Respiratory: Positive for cough and shortness of breath.    Cardiovascular: Negative for chest pain.   Gastrointestinal: Positive for nausea. Negative for diarrhea and vomiting.   All other systems reviewed and are negative.      Physical Exam     Initial Vitals [03/25/22 1109]   BP Pulse Resp Temp SpO2   (!) 180/64 87 14 (!) 101.7 °F (38.7 °C) (!) 91 %      MAP       --         Physical Exam    Nursing note and vitals reviewed.  Constitutional: She appears well-developed and well-nourished. She appears distressed.   HENT:   Head:  Normocephalic and atraumatic.   Right Ear: External ear normal.   Left Ear: External ear normal.   Nose: Nose normal.   Mouth/Throat: Oropharynx is clear and moist.   Eyes: Conjunctivae and EOM are normal. Pupils are equal, round, and reactive to light.   Neck:   Normal range of motion.  Cardiovascular: Normal rate, regular rhythm and normal heart sounds.   Pulmonary/Chest:   Patient has rhonchi bilaterally.  She is mildly tachypneic.   Abdominal: Abdomen is soft. She exhibits no distension. There is no abdominal tenderness.   Musculoskeletal:         General: Normal range of motion.      Cervical back: Normal range of motion.     Neurological: She is alert and oriented to person, place, and time. No cranial nerve deficit.   Skin: Skin is dry.   Psychiatric: She has a normal mood and affect. Thought content normal.         ED Course   Procedures  Labs Reviewed   CBC W/ AUTO DIFFERENTIAL - Abnormal; Notable for the following components:       Result Value    WBC 13.08 (*)     Immature Granulocytes 0.6 (*)     Gran # (ANC) 10.2 (*)     Immature Grans (Abs) 0.08 (*)     Mono # 1.1 (*)     Gran % 77.8 (*)     Lymph % 10.8 (*)     All other components within normal limits   COMPREHENSIVE METABOLIC PANEL - Abnormal; Notable for the following components:    Glucose 144 (*)     Albumin 3.4 (*)     All other components within normal limits   INFLUENZA A & B BY MOLECULAR   CULTURE, BLOOD   CULTURE, BLOOD   LACTIC ACID, PLASMA   SARS-COV-2 RNA AMPLIFICATION, QUAL    Narrative:     Is the patient symptomatic?->Yes   B-TYPE NATRIURETIC PEPTIDE   B-TYPE NATRIURETIC PEPTIDE   LACTIC ACID, PLASMA   URINALYSIS, REFLEX TO URINE CULTURE   PROCALCITONIN     EKG Readings: (Independently Interpreted)   Sinus rhythm, rate of 87, left axis deviation, no ST elevation       Imaging Results           X-Ray Chest AP Portable (Final result)  Result time 03/25/22 11:51:33    Final result by Dorian Wadsworth MD (03/25/22 11:51:33)                  Impression:      1. Pulmonary hypoinflation limits evaluation  2. Increased markings within the right lower lobe suspicious for developing pulmonary infiltrate.  Correlate clinically with possible fever and/or elevated white count.  3. Suspected mild underlying congestive heart failure small bilateral pleural effusions.  Close interval follow-up is recommended.    This report was flagged in Epic as abnormal.      Electronically signed by: Dorian Wadsworth  Date:    03/25/2022  Time:    11:51             Narrative:    EXAMINATION:  XR CHEST AP PORTABLE    CLINICAL HISTORY:  Sepsis;    TECHNIQUE:  Single frontal view of the chest was performed.    COMPARISON:  10/08/2021.    FINDINGS:  Pulmonary hypoinflation with crowding of bronchopulmonary vessels.  Mild prominence of the pulmonary interstitium suspicious for mild interstitial edema.  Focal increased markings within the right lower lobe may represent a developing pulmonary infiltrate.  Suspected small bilateral pleural effusions.    Heart size is enlarged.  Calcified aortic plaque.  Trachea midline.    Bony thorax intact.                                 Medications   levoFLOXacin 750 mg/150 mL IVPB 750 mg (750 mg Intravenous New Bag 3/25/22 1234)   ondansetron injection 4 mg (4 mg Intravenous Given 3/25/22 1122)   albuterol-ipratropium 2.5 mg-0.5 mg/3 mL nebulizer solution 3 mL (3 mLs Nebulization Given 3/25/22 1134)   acetaminophen tablet 650 mg (650 mg Oral Given 3/25/22 1239)     Medical Decision Making:   Initial Assessment:   Patient presents to the ER in mild respiratory distress.  She comes in by ambulance.  She was found to be hypoxic with room air saturations of 88%.  She is doing fine on nasal cannula oxygen.  The patient has a fairly impressive lung exam in terms of rhonchi but she only has a small early pneumonia in the right lung on chest x-ray.  I suspect that the x-ray will get worse in the near future.  I have started her on antibiotics for  community-acquired pneumonia.  Her lactic is normal.  Blood cultures have been obtained.  We treated her fever in the ER.  Case discussed with the hospitalist.                      Clinical Impression:   Final diagnoses:  [R06.02] SOB (shortness of breath)  [J96.01] Acute respiratory failure with hypoxia (Primary)  [J18.9] Community acquired pneumonia of right lung, unspecified part of lung          ED Disposition Condition    Observation               Efraín Arora MD  03/25/22 2859

## 2022-03-25 NOTE — ED NOTES
Patient extremely wet sounding on initial assessment . ERP holds fluid bolus until CXR completed.

## 2022-03-25 NOTE — PLAN OF CARE
03/25/22 1532   GRIFFITH Message   Medicare Outpatient and Observation Notification regarding financial responsibility Given to patient/caregiver;Explained to patient/caregiver;Signed/date by patient/caregiver   Date GRIFFITH was signed 03/25/22   Time GRIFFITH was signed [id number]

## 2022-03-25 NOTE — HPI
Patient is a 66-year-old female with past medical history of diabetes, hyperlipidemia, hypertension who presents to the ER with complaints of fever, cough, shortness of breath over the last several days that worsened last night.  Her sister has also been sick with similar symptoms.  Symptoms started with sneezing, sore throat and seemed to progress with shortness of breath and a deep productive cough.  She came to the ER today because of fever and worsening lethargy, cough.  In the ER, she was febrile to 101.4, tachypneic and hypoxic requiring supplemental oxygen.  Chest x-ray showed right lower lobe pneumonia.  Hospital team called for admission.
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
## 2022-03-25 NOTE — ASSESSMENT & PLAN NOTE
Possibly viral vs bacterial  Febrile on admission to 101.4. lactic normal.  F/u blood cultures  Sputum cultures ordered  Will order viral resp panel if no improvement in symptoms  wesleyoneleslie scheduled  IV steroids  IV levaquin  Daily labs - CBC, CMP  Supplemental oxygen to maintain sats >94%

## 2022-03-26 LAB
ANION GAP SERPL CALC-SCNC: 13 MMOL/L (ref 8–16)
BASOPHILS NFR BLD: 0 % (ref 0–1.9)
BUN SERPL-MCNC: 17 MG/DL (ref 8–23)
CALCIUM SERPL-MCNC: 8.6 MG/DL (ref 8.7–10.5)
CHLORIDE SERPL-SCNC: 101 MMOL/L (ref 95–110)
CO2 SERPL-SCNC: 24 MMOL/L (ref 23–29)
CREAT SERPL-MCNC: 0.8 MG/DL (ref 0.5–1.4)
DIFFERENTIAL METHOD: ABNORMAL
EOSINOPHIL NFR BLD: 0 % (ref 0–8)
ERYTHROCYTE [DISTWIDTH] IN BLOOD BY AUTOMATED COUNT: 14.3 % (ref 11.5–14.5)
EST. GFR  (AFRICAN AMERICAN): >60 ML/MIN/1.73 M^2
EST. GFR  (NON AFRICAN AMERICAN): >60 ML/MIN/1.73 M^2
GLUCOSE SERPL-MCNC: 123 MG/DL (ref 70–110)
HCT VFR BLD AUTO: 31.9 % (ref 37–48.5)
HGB BLD-MCNC: 10.5 G/DL (ref 12–16)
IMM GRANULOCYTES # BLD AUTO: ABNORMAL K/UL
IMM GRANULOCYTES NFR BLD AUTO: ABNORMAL %
LYMPHOCYTES NFR BLD: 20 % (ref 18–48)
MAGNESIUM SERPL-MCNC: 1.6 MG/DL (ref 1.6–2.6)
MCH RBC QN AUTO: 30.4 PG (ref 27–31)
MCHC RBC AUTO-ENTMCNC: 32.9 G/DL (ref 32–36)
MCV RBC AUTO: 93 FL (ref 82–98)
MONOCYTES NFR BLD: 3 % (ref 4–15)
NEUTROPHILS NFR BLD: 51 % (ref 38–73)
NEUTS BAND NFR BLD MANUAL: 26 %
NRBC BLD-RTO: 0 /100 WBC
PLATELET # BLD AUTO: 181 K/UL (ref 150–450)
PMV BLD AUTO: 11 FL (ref 9.2–12.9)
POCT GLUCOSE: 131 MG/DL (ref 70–110)
POCT GLUCOSE: 150 MG/DL (ref 70–110)
POCT GLUCOSE: 300 MG/DL (ref 70–110)
POTASSIUM SERPL-SCNC: 3.4 MMOL/L (ref 3.5–5.1)
RBC # BLD AUTO: 3.45 M/UL (ref 4–5.4)
SODIUM SERPL-SCNC: 138 MMOL/L (ref 136–145)
WBC # BLD AUTO: 17.98 K/UL (ref 3.9–12.7)

## 2022-03-26 PROCEDURE — 25000242 PHARM REV CODE 250 ALT 637 W/ HCPCS: Performed by: FAMILY MEDICINE

## 2022-03-26 PROCEDURE — 25000003 PHARM REV CODE 250: Performed by: HOSPITALIST

## 2022-03-26 PROCEDURE — 94640 AIRWAY INHALATION TREATMENT: CPT

## 2022-03-26 PROCEDURE — 63600175 PHARM REV CODE 636 W HCPCS: Performed by: FAMILY MEDICINE

## 2022-03-26 PROCEDURE — 36415 COLL VENOUS BLD VENIPUNCTURE: CPT | Performed by: FAMILY MEDICINE

## 2022-03-26 PROCEDURE — 85007 BL SMEAR W/DIFF WBC COUNT: CPT | Performed by: FAMILY MEDICINE

## 2022-03-26 PROCEDURE — 99233 PR SUBSEQUENT HOSPITAL CARE,LEVL III: ICD-10-PCS | Mod: ,,, | Performed by: FAMILY MEDICINE

## 2022-03-26 PROCEDURE — 27000221 HC OXYGEN, UP TO 24 HOURS

## 2022-03-26 PROCEDURE — 96372 THER/PROPH/DIAG INJ SC/IM: CPT | Performed by: FAMILY MEDICINE

## 2022-03-26 PROCEDURE — 25000003 PHARM REV CODE 250: Performed by: FAMILY MEDICINE

## 2022-03-26 PROCEDURE — 94761 N-INVAS EAR/PLS OXIMETRY MLT: CPT

## 2022-03-26 PROCEDURE — 80048 BASIC METABOLIC PNL TOTAL CA: CPT | Performed by: FAMILY MEDICINE

## 2022-03-26 PROCEDURE — 99900035 HC TECH TIME PER 15 MIN (STAT)

## 2022-03-26 PROCEDURE — 83735 ASSAY OF MAGNESIUM: CPT | Performed by: FAMILY MEDICINE

## 2022-03-26 PROCEDURE — 99233 SBSQ HOSP IP/OBS HIGH 50: CPT | Mod: ,,, | Performed by: FAMILY MEDICINE

## 2022-03-26 PROCEDURE — 85027 COMPLETE CBC AUTOMATED: CPT | Performed by: FAMILY MEDICINE

## 2022-03-26 PROCEDURE — 21400001 HC TELEMETRY ROOM

## 2022-03-26 RX ORDER — GUAIFENESIN 600 MG/1
600 TABLET, EXTENDED RELEASE ORAL 2 TIMES DAILY
Status: DISCONTINUED | OUTPATIENT
Start: 2022-03-26 | End: 2022-03-31 | Stop reason: HOSPADM

## 2022-03-26 RX ORDER — BENZONATATE 100 MG/1
100 CAPSULE ORAL 3 TIMES DAILY PRN
Status: DISCONTINUED | OUTPATIENT
Start: 2022-03-26 | End: 2022-03-31 | Stop reason: HOSPADM

## 2022-03-26 RX ADMIN — CARVEDILOL 12.5 MG: 12.5 TABLET, FILM COATED ORAL at 04:03

## 2022-03-26 RX ADMIN — GUAIFENESIN 600 MG: 600 TABLET, EXTENDED RELEASE ORAL at 08:03

## 2022-03-26 RX ADMIN — OXYBUTYNIN CHLORIDE 5 MG: 5 TABLET ORAL at 08:03

## 2022-03-26 RX ADMIN — CARVEDILOL 12.5 MG: 12.5 TABLET, FILM COATED ORAL at 08:03

## 2022-03-26 RX ADMIN — IPRATROPIUM BROMIDE AND ALBUTEROL SULFATE 3 ML: 2.5; .5 SOLUTION RESPIRATORY (INHALATION) at 12:03

## 2022-03-26 RX ADMIN — TRAZODONE HYDROCHLORIDE 25 MG: 50 TABLET ORAL at 08:03

## 2022-03-26 RX ADMIN — ATORVASTATIN CALCIUM 20 MG: 10 TABLET, FILM COATED ORAL at 08:03

## 2022-03-26 RX ADMIN — HEPARIN SODIUM 5000 UNITS: 5000 INJECTION, SOLUTION INTRAVENOUS; SUBCUTANEOUS at 09:03

## 2022-03-26 RX ADMIN — IPRATROPIUM BROMIDE AND ALBUTEROL SULFATE 3 ML: 2.5; .5 SOLUTION RESPIRATORY (INHALATION) at 07:03

## 2022-03-26 RX ADMIN — HEPARIN SODIUM 5000 UNITS: 5000 INJECTION, SOLUTION INTRAVENOUS; SUBCUTANEOUS at 02:03

## 2022-03-26 RX ADMIN — ACETAMINOPHEN 650 MG: 325 TABLET ORAL at 06:03

## 2022-03-26 RX ADMIN — ACETAMINOPHEN 650 MG: 325 TABLET ORAL at 03:03

## 2022-03-26 RX ADMIN — HEPARIN SODIUM 5000 UNITS: 5000 INJECTION, SOLUTION INTRAVENOUS; SUBCUTANEOUS at 05:03

## 2022-03-26 RX ADMIN — BUSPIRONE HYDROCHLORIDE 15 MG: 5 TABLET ORAL at 08:03

## 2022-03-26 RX ADMIN — INSULIN ASPART 6 UNITS: 100 INJECTION, SOLUTION INTRAVENOUS; SUBCUTANEOUS at 04:03

## 2022-03-26 RX ADMIN — INSULIN ASPART 2 UNITS: 100 INJECTION, SOLUTION INTRAVENOUS; SUBCUTANEOUS at 11:03

## 2022-03-26 RX ADMIN — IPRATROPIUM BROMIDE AND ALBUTEROL SULFATE 3 ML: 2.5; .5 SOLUTION RESPIRATORY (INHALATION) at 04:03

## 2022-03-26 RX ADMIN — GABAPENTIN 600 MG: 300 CAPSULE ORAL at 08:03

## 2022-03-26 RX ADMIN — INSULIN DETEMIR 30 UNITS: 100 INJECTION, SOLUTION SUBCUTANEOUS at 08:03

## 2022-03-26 RX ADMIN — LEVOFLOXACIN 750 MG: 750 INJECTION, SOLUTION INTRAVENOUS at 02:03

## 2022-03-26 RX ADMIN — LISINOPRIL 20 MG: 10 TABLET ORAL at 08:03

## 2022-03-26 RX ADMIN — ASPIRIN 81 MG: 81 TABLET, CHEWABLE ORAL at 08:03

## 2022-03-26 RX ADMIN — BENZONATATE 100 MG: 100 CAPSULE ORAL at 03:03

## 2022-03-26 NOTE — PLAN OF CARE
03/26/22 1131   Medicare Message   Important Message from Medicare regarding Discharge Appeal Rights Given to patient/caregiver;Explained to patient/caregiver;Signed/date by patient/caregiver   Date IMM was signed 03/26/22   Time IMM was signed [id number]

## 2022-03-26 NOTE — PROGRESS NOTES
[facility]  Progress Note    Patient Name: Syl Matos  MRN: [id number]  Patient Class: IP- Inpatient   Admission Date: 3/25/2022  Length of Stay: 0 days  Attending Physician: Lexy Kimbrough MD  Primary Care Provider: Luis Diaz MD        Subjective:     Principal Problem:Pneumonia of right lower lobe due to infectious organism        HPI:  Patient is a 66-year-old female with past medical history of diabetes, hyperlipidemia, hypertension who presents to the ER with complaints of fever, cough, shortness of breath over the last several days that worsened last night.  Her sister has also been sick with similar symptoms.  Symptoms started with sneezing, sore throat and seemed to progress with shortness of breath and a deep productive cough.  She came to the ER today because of fever and worsening lethargy, cough.  In the ER, she was febrile to 101.4, tachypneic and hypoxic requiring supplemental oxygen.  Chest x-ray showed right lower lobe pneumonia.  Hospital team called for admission.      Overview/Hospital Course:  No notes on file    Interval History: febrile this morning, still requiring oxygen, with cough and shortness of breath    Review of Systems   Constitutional:  Positive for fatigue and fever. Negative for chills.   HENT:  Negative for congestion, sneezing and sore throat.    Respiratory:  Positive for cough and shortness of breath.    Cardiovascular:  Negative for chest pain.   Gastrointestinal:  Negative for abdominal pain, nausea and vomiting.   Endocrine: Negative.    Genitourinary: Negative.    Musculoskeletal: Negative.    Skin: Negative.    Allergic/Immunologic: Negative.    Neurological:  Positive for weakness.   Hematological: Negative.    Psychiatric/Behavioral: Negative.     Objective:     Vital Signs (Most Recent):  Temp: 98.5 °F (36.9 °C) (03/26/22 0730)  Pulse: 68 (03/26/22 0734)  Resp: (!) 22 (03/26/22 0734)  BP: (!) 116/58 (03/26/22 0730)  SpO2: 97 %  (decreased to 2L) (03/26/22 0734)   Vital Signs (24h Range):  Temp:  [98.5 °F (36.9 °C)-101.7 °F (38.7 °C)] 98.5 °F (36.9 °C)  Pulse:  [66-90] 68  Resp:  [14-22] 22  SpO2:  [91 %-98 %] 97 %  BP: (101-180)/(55-65) 116/58     Weight: 90.7 kg (200 lb)  Body mass index is 33.28 kg/m².    Intake/Output Summary (Last 24 hours) at 3/26/2022 1108  Last data filed at 3/26/2022 0500  Gross per 24 hour   Intake 240 ml   Output 50 ml   Net 190 ml      Physical Exam  Vitals reviewed.   Constitutional:       General: She is not in acute distress.     Appearance: She is obese. She is not toxic-appearing or diaphoretic.   HENT:      Head: Normocephalic and atraumatic.      Right Ear: External ear normal.      Left Ear: External ear normal.      Nose: Nose normal.      Mouth/Throat:      Mouth: Mucous membranes are dry.   Eyes:      Conjunctiva/sclera: Conjunctivae normal.   Cardiovascular:      Rate and Rhythm: Normal rate and regular rhythm.      Heart sounds: No murmur heard.    No gallop.   Pulmonary:      Effort: No respiratory distress.      Breath sounds: Wheezing and rales present. No rhonchi.   Abdominal:      General: Bowel sounds are normal. There is no distension.      Tenderness: There is no abdominal tenderness.   Musculoskeletal:         General: No signs of injury.   Skin:     General: Skin is warm and dry.   Neurological:      Mental Status: She is alert. Mental status is at baseline.   Psychiatric:         Mood and Affect: Mood normal.       Significant Labs: All pertinent labs within the past 24 hours have been reviewed.  CBC:   Recent Labs   Lab 03/25/22  1124 03/26/22  0624   WBC 13.08* 17.98*   HGB 12.5 10.5*   HCT 38.3 31.9*    181     CMP:   Recent Labs   Lab 03/25/22  1124 03/26/22  0624    138   K 3.6 3.4*    101   CO2 24 24   * 123*   BUN 17 17   CREATININE 0.9 0.8   CALCIUM 9.1 8.6*   PROT 7.6  --    ALBUMIN 3.4*  --    BILITOT 0.8  --    ALKPHOS 63  --    AST 19  --    ALT 18   --    ANIONGAP 11 13   EGFRNONAA >60.0 >60.0       Significant Imaging: I have reviewed all pertinent imaging results/findings within the past 24 hours.  X-Ray Chest AP Portable   Final Result   Abnormal      1. Pulmonary hypoinflation limits evaluation   2. Increased markings within the right lower lobe suspicious for developing pulmonary infiltrate.  Correlate clinically with possible fever and/or elevated white count.   3. Suspected mild underlying congestive heart failure small bilateral pleural effusions.   Close interval follow-up is recommended.      This report was flagged in Epic as abnormal.         Electronically signed by: Dorian Wadsworth   Date:    03/25/2022   Time:    11:51              Assessment/Plan:      * Pneumonia of right lower lobe due to infectious organism  Possibly viral vs bacterial  Febrile on admission to [facility].4. lactic normal. Second episode of fever this AM  F/u blood cultures  Sputum cultures ordered  Will order viral resp panel if no improvement in symptoms  duoneleslie scheduled  IV steroids  IV levaquin  Daily labs - CBC, CMP  Supplemental oxygen to maintain sats >94%      Mixed diabetic hyperlipidemia associated with type 2 diabetes mellitus  See above, continue statin therapy      Essential hypertension  Chronic, uncontrolled.  Latest blood pressure and vitals reviewed-   Temp:  [98.5 °F (36.9 °C)-101.7 °F (38.7 °C)]   Pulse:  [66-90]   Resp:  [14-22]   BP: (101-180)/(55-65)   SpO2:  [91 %-98 %] .   Home meds for hypertension were reviewed and noted below.   Hypertension Medications             amLODIPine (NORVASC) 10 MG tablet Take 1 tablet (10 mg total) by mouth once daily.    carvedilol (COREG) 12.5 MG tablet TK 1 T PO BID    furosemide (LASIX) 20 MG tablet Take 20 mg by mouth 2 (two) times daily.    lisinopril (PRINIVIL,ZESTRIL) 20 MG tablet Take 20 mg by mouth once daily.    metoprolol succinate (TOPROL-XL) 50 MG 24 hr tablet Take 50 mg by mouth once daily.    propranolol (INDERAL  LA) 160 mg 24 hr capsule     triamterene-hydrochlorothiazide 37.5-25 mg (DYAZIDE) 37.5-25 mg per capsule Take 1 capsule by mouth every morning.          While in the hospital, will manage blood pressure as follows; resume home blood pressure medications    Will utilize p.r.n. blood pressure medication only if patient's blood pressure greater than  180/110 and she develops symptoms such as worsening chest pain or shortness of breath.      Type 2 diabetes mellitus with hyperglycemia, with long-term current use of insulin  Patient's FSGs are uncontrolled due to hyperglycemia on current medication regimen.  Last A1c reviewed-   Lab Results   Component Value Date    HGBA1C 8.3 (H) 01/14/2021     Most recent fingerstick glucose reviewed-   Recent Labs   Lab 03/25/22  1603 03/25/22 2014 03/26/22  0729   POCTGLUCOSE 222* 150* 131*     Current correctional scale  Medium  Decrease anti-hyperglycemic dose as follows- decrease basal insulin by 50% initially  Antihyperglycemics (From admission, onward)            Start     Stop Route Frequency Ordered    03/25/22 2100  insulin detemir U-100 pen 30 Units         -- SubQ Nightly 03/25/22 1410    03/25/22 1414  insulin aspart U-100 pen 1-10 Units         -- SubQ Before meals & nightly PRN 03/25/22 1410        Hold Oral hypoglycemics while patient is in the hospital.          VTE Risk Mitigation (From admission, onward)         Ordered     heparin (porcine) injection 5,000 Units  Every 8 hours         03/25/22 1410     IP VTE HIGH RISK PATIENT  Once         03/25/22 1410     Place sequential compression device  Until discontinued         03/25/22 1410                Discharge Planning   MAGNOLIA:      Code Status: Full Code   Is the patient medically ready for discharge?:     Reason for patient still in hospital (select all that apply): Treatment  Discharge Plan A: Home                  Lexy Kimbrough MD  Department of Hospital Medicine   [facility]

## 2022-03-26 NOTE — ASSESSMENT & PLAN NOTE
Possibly viral vs bacterial  Febrile on admission to 101.4. lactic normal. Second episode of fever this AM  F/u blood cultures  Sputum cultures ordered  Will order viral resp panel if no improvement in symptoms  nichole scheduled  IV steroids  IV levaquin  Daily labs - CBC, CMP  Supplemental oxygen to maintain sats >94%

## 2022-03-26 NOTE — SUBJECTIVE & OBJECTIVE
Interval History: febrile this morning, still requiring oxygen, with cough and shortness of breath    Review of Systems   Constitutional:  Positive for fatigue and fever. Negative for chills.   HENT:  Negative for congestion, sneezing and sore throat.    Respiratory:  Positive for cough and shortness of breath.    Cardiovascular:  Negative for chest pain.   Gastrointestinal:  Negative for abdominal pain, nausea and vomiting.   Endocrine: Negative.    Genitourinary: Negative.    Musculoskeletal: Negative.    Skin: Negative.    Allergic/Immunologic: Negative.    Neurological:  Positive for weakness.   Hematological: Negative.    Psychiatric/Behavioral: Negative.     Objective:     Vital Signs (Most Recent):  Temp: 98.5 °F (36.9 °C) (03/26/22 0730)  Pulse: 68 (03/26/22 0734)  Resp: (!) 22 (03/26/22 0734)  BP: (!) 116/58 (03/26/22 0730)  SpO2: 97 % (decreased to 2L) (03/26/22 0734)   Vital Signs (24h Range):  Temp:  [98.5 °F (36.9 °C)-101.7 °F (38.7 °C)] 98.5 °F (36.9 °C)  Pulse:  [66-90] 68  Resp:  [14-22] 22  SpO2:  [91 %-98 %] 97 %  BP: (101-180)/(55-65) 116/58     Weight: 90.7 kg (200 lb)  Body mass index is 33.28 kg/m².    Intake/Output Summary (Last 24 hours) at 3/26/2022 1108  Last data filed at 3/26/2022 0500  Gross per 24 hour   Intake 240 ml   Output 50 ml   Net 190 ml      Physical Exam  Vitals reviewed.   Constitutional:       General: She is not in acute distress.     Appearance: She is obese. She is not toxic-appearing or diaphoretic.   HENT:      Head: Normocephalic and atraumatic.      Right Ear: External ear normal.      Left Ear: External ear normal.      Nose: Nose normal.      Mouth/Throat:      Mouth: Mucous membranes are dry.   Eyes:      Conjunctiva/sclera: Conjunctivae normal.   Cardiovascular:      Rate and Rhythm: Normal rate and regular rhythm.      Heart sounds: No murmur heard.    No gallop.   Pulmonary:      Effort: No respiratory distress.      Breath sounds: Wheezing and rales present. No  rhonchi.   Abdominal:      General: Bowel sounds are normal. There is no distension.      Tenderness: There is no abdominal tenderness.   Musculoskeletal:         General: No signs of injury.   Skin:     General: Skin is warm and dry.   Neurological:      Mental Status: She is alert. Mental status is at baseline.   Psychiatric:         Mood and Affect: Mood normal.       Significant Labs: All pertinent labs within the past 24 hours have been reviewed.  CBC:   Recent Labs   Lab 03/25/22  1124 03/26/22  0624   WBC 13.08* 17.98*   HGB 12.5 10.5*   HCT 38.3 31.9*    181     CMP:   Recent Labs   Lab 03/25/22  1124 03/26/22  0624    138   K 3.6 3.4*    101   CO2 24 24   * 123*   BUN 17 17   CREATININE 0.9 0.8   CALCIUM 9.1 8.6*   PROT 7.6  --    ALBUMIN 3.4*  --    BILITOT 0.8  --    ALKPHOS 63  --    AST 19  --    ALT 18  --    ANIONGAP 11 13   EGFRNONAA >60.0 >60.0       Significant Imaging: I have reviewed all pertinent imaging results/findings within the past 24 hours.  X-Ray Chest AP Portable   Final Result   Abnormal      1. Pulmonary hypoinflation limits evaluation   2. Increased markings within the right lower lobe suspicious for developing pulmonary infiltrate.  Correlate clinically with possible fever and/or elevated white count.   3. Suspected mild underlying congestive heart failure small bilateral pleural effusions.   Close interval follow-up is recommended.      This report was flagged in Epic as abnormal.         Electronically signed by: Dorian Wadsworth   Date:    03/25/2022   Time:    11:51

## 2022-03-26 NOTE — PLAN OF CARE
Problem: Adult Inpatient Plan of Care  Goal: Plan of Care Review  Outcome: Ongoing, Progressing  Goal: Patient-Specific Goal (Individualized)  Outcome: Ongoing, Progressing  Goal: Absence of Hospital-Acquired Illness or Injury  Outcome: Ongoing, Progressing  Goal: Optimal Comfort and Wellbeing  Outcome: Ongoing, Progressing  Goal: Readiness for Transition of Care  Outcome: Ongoing, Progressing     Problem: Diabetes Comorbidity  Goal: Blood Glucose Level Within Targeted Range  Outcome: Ongoing, Progressing     Problem: Skin Injury Risk Increased  Goal: Skin Health and Integrity  Outcome: Ongoing, Progressing     Problem: Respiratory Compromise (Pneumonia)  Goal: Effective Oxygenation and Ventilation  Outcome: Ongoing, Progressing

## 2022-03-26 NOTE — ASSESSMENT & PLAN NOTE
Patient's FSGs are uncontrolled due to hyperglycemia on current medication regimen.  Last A1c reviewed-   Lab Results   Component Value Date    HGBA1C 8.3 (H) 01/14/2021     Most recent fingerstick glucose reviewed-   Recent Labs   Lab 03/25/22  1603 03/25/22 2014 03/26/22  0729   POCTGLUCOSE 222* 150* 131*     Current correctional scale  Medium  Decrease anti-hyperglycemic dose as follows- decrease basal insulin by 50% initially  Antihyperglycemics (From admission, onward)            Start     Stop Route Frequency Ordered    03/25/22 2100  insulin detemir U-100 pen 30 Units         -- SubQ Nightly 03/25/22 1410    03/25/22 1414  insulin aspart U-100 pen 1-10 Units         -- SubQ Before meals & nightly PRN 03/25/22 1410        Hold Oral hypoglycemics while patient is in the hospital.

## 2022-03-26 NOTE — PLAN OF CARE
Problem: Adult Inpatient Plan of Care  Goal: Absence of Hospital-Acquired Illness or Injury  Outcome: Ongoing, Progressing  Goal: Optimal Comfort and Wellbeing  Outcome: Ongoing, Progressing     Problem: Diabetes Comorbidity  Goal: Blood Glucose Level Within Targeted Range  Outcome: Ongoing, Progressing     Problem: Fluid Imbalance (Pneumonia)  Goal: Fluid Balance  Outcome: Ongoing, Progressing     Problem: Infection (Pneumonia)  Goal: Resolution of Infection Signs and Symptoms  Outcome: Ongoing, Progressing     Problem: Respiratory Compromise (Pneumonia)  Goal: Effective Oxygenation and Ventilation  Outcome: Ongoing, Progressing     Problem: Fall Injury Risk  Goal: Absence of Fall and Fall-Related Injury  Outcome: Ongoing, Progressing

## 2022-03-26 NOTE — ASSESSMENT & PLAN NOTE
Chronic, uncontrolled.  Latest blood pressure and vitals reviewed-   Temp:  [98.5 °F (36.9 °C)-101.7 °F (38.7 °C)]   Pulse:  [66-90]   Resp:  [14-22]   BP: (101-180)/(55-65)   SpO2:  [91 %-98 %] .   Home meds for hypertension were reviewed and noted below.   Hypertension Medications             amLODIPine (NORVASC) 10 MG tablet Take 1 tablet (10 mg total) by mouth once daily.    carvedilol (COREG) 12.5 MG tablet TK 1 T PO BID    furosemide (LASIX) 20 MG tablet Take 20 mg by mouth 2 (two) times daily.    lisinopril (PRINIVIL,ZESTRIL) 20 MG tablet Take 20 mg by mouth once daily.    metoprolol succinate (TOPROL-XL) 50 MG 24 hr tablet Take 50 mg by mouth once daily.    propranolol (INDERAL LA) 160 mg 24 hr capsule     triamterene-hydrochlorothiazide 37.5-25 mg (DYAZIDE) 37.5-25 mg per capsule Take 1 capsule by mouth every morning.          While in the hospital, will manage blood pressure as follows; resume home blood pressure medications    Will utilize p.r.n. blood pressure medication only if patient's blood pressure greater than  180/110 and she develops symptoms such as worsening chest pain or shortness of breath.

## 2022-03-27 LAB
ADENOVIRUS: NOT DETECTED
ANION GAP SERPL CALC-SCNC: 9 MMOL/L (ref 8–16)
BASOPHILS # BLD AUTO: 0.03 K/UL (ref 0–0.2)
BASOPHILS NFR BLD: 0.2 % (ref 0–1.9)
BORDETELLA PARAPERTUSSIS (IS1001): NOT DETECTED
BORDETELLA PERTUSSIS (PTXP): NOT DETECTED
BUN SERPL-MCNC: 11 MG/DL (ref 8–23)
CALCIUM SERPL-MCNC: 9.1 MG/DL (ref 8.7–10.5)
CHLAMYDIA PNEUMONIAE: NOT DETECTED
CHLORIDE SERPL-SCNC: 104 MMOL/L (ref 95–110)
CO2 SERPL-SCNC: 28 MMOL/L (ref 23–29)
CORONAVIRUS 229E, COMMON COLD VIRUS: NOT DETECTED
CORONAVIRUS HKU1, COMMON COLD VIRUS: NOT DETECTED
CORONAVIRUS NL63, COMMON COLD VIRUS: NOT DETECTED
CORONAVIRUS OC43, COMMON COLD VIRUS: NOT DETECTED
CREAT SERPL-MCNC: 0.8 MG/DL (ref 0.5–1.4)
DIFFERENTIAL METHOD: ABNORMAL
EOSINOPHIL # BLD AUTO: 0.1 K/UL (ref 0–0.5)
EOSINOPHIL NFR BLD: 0.6 % (ref 0–8)
ERYTHROCYTE [DISTWIDTH] IN BLOOD BY AUTOMATED COUNT: 14.1 % (ref 11.5–14.5)
EST. GFR  (AFRICAN AMERICAN): >60 ML/MIN/1.73 M^2
EST. GFR  (NON AFRICAN AMERICAN): >60 ML/MIN/1.73 M^2
FLUBV RNA NPH QL NAA+NON-PROBE: NOT DETECTED
GLUCOSE SERPL-MCNC: 145 MG/DL (ref 70–110)
HCT VFR BLD AUTO: 31.4 % (ref 37–48.5)
HGB BLD-MCNC: 10.1 G/DL (ref 12–16)
HPIV1 RNA NPH QL NAA+NON-PROBE: NOT DETECTED
HPIV2 RNA NPH QL NAA+NON-PROBE: NOT DETECTED
HPIV3 RNA NPH QL NAA+NON-PROBE: NOT DETECTED
HPIV4 RNA NPH QL NAA+NON-PROBE: NOT DETECTED
HUMAN METAPNEUMOVIRUS: NOT DETECTED
IMM GRANULOCYTES # BLD AUTO: 0.24 K/UL (ref 0–0.04)
IMM GRANULOCYTES NFR BLD AUTO: 1.6 % (ref 0–0.5)
INFLUENZA A (SUBTYPES H1,H1-2009,H3): NOT DETECTED
LYMPHOCYTES # BLD AUTO: 2.7 K/UL (ref 1–4.8)
LYMPHOCYTES NFR BLD: 17.5 % (ref 18–48)
MAGNESIUM SERPL-MCNC: 2 MG/DL (ref 1.6–2.6)
MCH RBC QN AUTO: 30.1 PG (ref 27–31)
MCHC RBC AUTO-ENTMCNC: 32.2 G/DL (ref 32–36)
MCV RBC AUTO: 94 FL (ref 82–98)
MONOCYTES # BLD AUTO: 1.1 K/UL (ref 0.3–1)
MONOCYTES NFR BLD: 6.9 % (ref 4–15)
MYCOPLASMA PNEUMONIAE: NOT DETECTED
NEUTROPHILS # BLD AUTO: 11.2 K/UL (ref 1.8–7.7)
NEUTROPHILS NFR BLD: 73.2 % (ref 38–73)
NRBC BLD-RTO: 0 /100 WBC
PLATELET # BLD AUTO: 206 K/UL (ref 150–450)
PMV BLD AUTO: 10.4 FL (ref 9.2–12.9)
POCT GLUCOSE: 141 MG/DL (ref 70–110)
POCT GLUCOSE: 176 MG/DL (ref 70–110)
POCT GLUCOSE: 199 MG/DL (ref 70–110)
POCT GLUCOSE: 200 MG/DL (ref 70–110)
POCT GLUCOSE: 206 MG/DL (ref 70–110)
POCT GLUCOSE: 285 MG/DL (ref 70–110)
POTASSIUM SERPL-SCNC: 3.7 MMOL/L (ref 3.5–5.1)
RBC # BLD AUTO: 3.35 M/UL (ref 4–5.4)
RESPIRATORY INFECTION PANEL SOURCE: ABNORMAL
RSV RNA NPH QL NAA+NON-PROBE: NOT DETECTED
RV+EV RNA NPH QL NAA+NON-PROBE: DETECTED
SARS-COV-2 RNA RESP QL NAA+PROBE: NOT DETECTED
SODIUM SERPL-SCNC: 141 MMOL/L (ref 136–145)
WBC # BLD AUTO: 15.26 K/UL (ref 3.9–12.7)

## 2022-03-27 PROCEDURE — 85025 COMPLETE CBC W/AUTO DIFF WBC: CPT | Performed by: FAMILY MEDICINE

## 2022-03-27 PROCEDURE — 63600175 PHARM REV CODE 636 W HCPCS: Performed by: FAMILY MEDICINE

## 2022-03-27 PROCEDURE — 94799 UNLISTED PULMONARY SVC/PX: CPT

## 2022-03-27 PROCEDURE — 21400001 HC TELEMETRY ROOM

## 2022-03-27 PROCEDURE — 83735 ASSAY OF MAGNESIUM: CPT | Performed by: FAMILY MEDICINE

## 2022-03-27 PROCEDURE — 94761 N-INVAS EAR/PLS OXIMETRY MLT: CPT

## 2022-03-27 PROCEDURE — 99233 PR SUBSEQUENT HOSPITAL CARE,LEVL III: ICD-10-PCS | Mod: ,,, | Performed by: FAMILY MEDICINE

## 2022-03-27 PROCEDURE — 87798 DETECT AGENT NOS DNA AMP: CPT | Performed by: FAMILY MEDICINE

## 2022-03-27 PROCEDURE — 94640 AIRWAY INHALATION TREATMENT: CPT

## 2022-03-27 PROCEDURE — 99900035 HC TECH TIME PER 15 MIN (STAT)

## 2022-03-27 PROCEDURE — 25000242 PHARM REV CODE 250 ALT 637 W/ HCPCS: Performed by: FAMILY MEDICINE

## 2022-03-27 PROCEDURE — 80048 BASIC METABOLIC PNL TOTAL CA: CPT | Performed by: FAMILY MEDICINE

## 2022-03-27 PROCEDURE — 25000003 PHARM REV CODE 250: Performed by: FAMILY MEDICINE

## 2022-03-27 PROCEDURE — 25000003 PHARM REV CODE 250: Performed by: HOSPITALIST

## 2022-03-27 PROCEDURE — 36415 COLL VENOUS BLD VENIPUNCTURE: CPT | Performed by: FAMILY MEDICINE

## 2022-03-27 PROCEDURE — 99233 SBSQ HOSP IP/OBS HIGH 50: CPT | Mod: ,,, | Performed by: FAMILY MEDICINE

## 2022-03-27 PROCEDURE — 27000221 HC OXYGEN, UP TO 24 HOURS

## 2022-03-27 RX ORDER — VANCOMYCIN HCL IN 5 % DEXTROSE 1G/250ML
1000 PLASTIC BAG, INJECTION (ML) INTRAVENOUS
Status: DISCONTINUED | OUTPATIENT
Start: 2022-03-27 | End: 2022-03-29

## 2022-03-27 RX ADMIN — HYDROCODONE BITARTRATE AND ACETAMINOPHEN 1 TABLET: 5; 325 TABLET ORAL at 09:03

## 2022-03-27 RX ADMIN — INSULIN ASPART 4 UNITS: 100 INJECTION, SOLUTION INTRAVENOUS; SUBCUTANEOUS at 05:03

## 2022-03-27 RX ADMIN — FLUTICASONE FUROATE AND VILANTEROL TRIFENATATE 1 PUFF: 100; 25 POWDER RESPIRATORY (INHALATION) at 02:03

## 2022-03-27 RX ADMIN — IPRATROPIUM BROMIDE AND ALBUTEROL SULFATE 3 ML: 2.5; .5 SOLUTION RESPIRATORY (INHALATION) at 01:03

## 2022-03-27 RX ADMIN — BUSPIRONE HYDROCHLORIDE 15 MG: 5 TABLET ORAL at 08:03

## 2022-03-27 RX ADMIN — IPRATROPIUM BROMIDE AND ALBUTEROL SULFATE 3 ML: 2.5; .5 SOLUTION RESPIRATORY (INHALATION) at 07:03

## 2022-03-27 RX ADMIN — HEPARIN SODIUM 5000 UNITS: 5000 INJECTION, SOLUTION INTRAVENOUS; SUBCUTANEOUS at 01:03

## 2022-03-27 RX ADMIN — VANCOMYCIN HYDROCHLORIDE 1000 MG: 1 INJECTION, POWDER, LYOPHILIZED, FOR SOLUTION INTRAVENOUS at 10:03

## 2022-03-27 RX ADMIN — ACETAMINOPHEN 650 MG: 325 TABLET ORAL at 02:03

## 2022-03-27 RX ADMIN — INSULIN DETEMIR 30 UNITS: 100 INJECTION, SOLUTION SUBCUTANEOUS at 08:03

## 2022-03-27 RX ADMIN — CARVEDILOL 12.5 MG: 12.5 TABLET, FILM COATED ORAL at 04:03

## 2022-03-27 RX ADMIN — CARVEDILOL 12.5 MG: 12.5 TABLET, FILM COATED ORAL at 08:03

## 2022-03-27 RX ADMIN — LISINOPRIL 20 MG: 10 TABLET ORAL at 08:03

## 2022-03-27 RX ADMIN — GUAIFENESIN 600 MG: 600 TABLET, EXTENDED RELEASE ORAL at 08:03

## 2022-03-27 RX ADMIN — INSULIN ASPART 3 UNITS: 100 INJECTION, SOLUTION INTRAVENOUS; SUBCUTANEOUS at 08:03

## 2022-03-27 RX ADMIN — HEPARIN SODIUM 5000 UNITS: 5000 INJECTION, SOLUTION INTRAVENOUS; SUBCUTANEOUS at 05:03

## 2022-03-27 RX ADMIN — GABAPENTIN 600 MG: 300 CAPSULE ORAL at 08:03

## 2022-03-27 RX ADMIN — HEPARIN SODIUM 5000 UNITS: 5000 INJECTION, SOLUTION INTRAVENOUS; SUBCUTANEOUS at 09:03

## 2022-03-27 RX ADMIN — ASPIRIN 81 MG: 81 TABLET, CHEWABLE ORAL at 08:03

## 2022-03-27 RX ADMIN — ATORVASTATIN CALCIUM 20 MG: 10 TABLET, FILM COATED ORAL at 08:03

## 2022-03-27 RX ADMIN — LEVOFLOXACIN 750 MG: 750 INJECTION, SOLUTION INTRAVENOUS at 01:03

## 2022-03-27 RX ADMIN — OXYBUTYNIN CHLORIDE 5 MG: 5 TABLET ORAL at 08:03

## 2022-03-27 RX ADMIN — VANCOMYCIN HYDROCHLORIDE 1000 MG: 1 INJECTION, POWDER, LYOPHILIZED, FOR SOLUTION INTRAVENOUS at 09:03

## 2022-03-27 NOTE — PROGRESS NOTES
Pharmacokinetic Initial Assessment: IV Vancomycin    Assessment/Plan:    Initiate intravenous vancomycin with maintenance dose of 1000 mg every 12 hours.  Desired empiric serum trough concentration is 15 to 20 mcg/mL  Draw vancomycin trough level 60 min prior to fourth dose on 3/28 at approximately 2100.  Pharmacy will continue to follow and monitor vancomycin.      Please contact pharmacy at extension [phone] with any questions regarding this assessment.     Thank you for the consult,   Santos Hancock, PharmD       Patient brief summary:  Syl Matos is a 66 y.o. female initiated on antimicrobial therapy with IV Vancomycin for treatment of suspected  pneumonia.    Drug Allergies:   Review of patient's allergies indicates:   Allergen Reactions    Iodine and iodide containing products Hives       Actual Body Weight:   90.7 kg    Renal Function:   Estimated Creatinine Clearance: 78 mL/min (based on SCr of 0.8 mg/dL).,     Dialysis Method (if applicable):  N/A    CBC (last 72 hours):  Recent Labs   Lab Result Units 03/25/22  1124 03/26/22  0624 03/27/22  0619   WBC K/uL 13.08* 17.98* 15.26*   Hemoglobin g/dL 12.5 10.5* 10.1*   Hematocrit % 38.3 31.9* 31.4*   Platelets K/uL 232 181 206   Gran % % 77.8* 51.0 73.2*   Lymph % % 10.8* 20.0 17.5*   Mono % % 8.0 3.0* 6.9   Eosinophil % % 2.5 0.0 0.6   Basophil % % 0.3 0.0 0.2   Differential Method  Automated Manual Automated       Metabolic Panel (last 72 hours):  Recent Labs   Lab Result Units 03/25/22  1124 03/25/22  1334 03/26/22  0624 03/27/22  0619   Sodium mmol/L 140  --  138 141   Potassium mmol/L 3.6  --  3.4* 3.7   Chloride mmol/L 105  --  101 104   CO2 mmol/L 24  --  24 28   Glucose mg/dL 144*  --  123* 145*   Glucose, UA   --  Negative  --   --    BUN mg/dL 17  --  17 11   Creatinine mg/dL 0.9  --  0.8 0.8   Albumin g/dL 3.4*  --   --   --    Total Bilirubin mg/dL 0.8  --   --   --    Alkaline Phosphatase U/L 63  --   --   --    AST U/L 19  --   --   --    ALT U/L  18  --   --   --    Magnesium mg/dL  --   --  1.6 2.0       Drug levels (last 3 results):  No results for input(s): VANCOMYCINRA, VANCOMYCINPE, VANCOMYCINTR in the last 72 hours.    Microbiologic Results:  Microbiology Results (last 7 days)       Procedure Component Value Units Date/Time    Respiratory Infection Panel (PCR), Nasopharyngeal [[id number]] Collected: 03/27/22 0831    Order Status: Completed Specimen: Nasopharyngeal Swab Updated: 03/27/22 0853     Respiratory Infection Panel Source NP swab    Narrative:      For all other respiratory sources, order [id number] -  Respiratory Viral Panel by PCR  Respiratory Infection Panel source->NP Swab    Blood culture x two cultures. Draw prior to antibiotics. [[id number]] Collected: 03/25/22 1125    Order Status: Completed Specimen: Blood from Peripheral, Forearm, Right Updated: 03/26/22 2012     Blood Culture, Routine No Growth to date      No Growth to date    Narrative:      Aerobic and anaerobic    Blood culture x two cultures. Draw prior to antibiotics. [[id number]] Collected: 03/25/22 1134    Order Status: Completed Specimen: Blood from Peripheral, Hand, Right Updated: 03/26/22 2012     Blood Culture, Routine No Growth to date      No Growth to date    Narrative:      Aerobic and anaerobic    Culture, Respiratory with Gram Stain [[id number]] Collected: 03/25/22 2120    Order Status: Completed Specimen: Respiratory from Sputum, Expectorated Updated: 03/26/22 1348     Gram Stain (Respiratory) <10 epithelial cells per low power field.     Gram Stain (Respiratory) Many WBC's     Gram Stain (Respiratory) Few Gram positive cocci    Influenza A & B by Molecular [[id number]] Collected: 03/25/22 1124    Order Status: Completed Specimen: Nasopharyngeal Swab Updated: 03/25/22 1158     Influenza A, Molecular Negative     Influenza B, Molecular Negative     Flu A & B Source Nasal Swab

## 2022-03-27 NOTE — ASSESSMENT & PLAN NOTE
Chronic, uncontrolled.  Latest blood pressure and vitals reviewed-   Temp:  [97.1 °F (36.2 °C)-100.8 °F (38.2 °C)]   Pulse:  [67-82]   Resp:  [16-20]   BP: (113-159)/(54-67)   SpO2:  [91 %-97 %] .   Home meds for hypertension were reviewed and noted below.   Hypertension Medications             amLODIPine (NORVASC) 10 MG tablet Take 1 tablet (10 mg total) by mouth once daily.    carvedilol (COREG) 12.5 MG tablet TK 1 T PO BID    furosemide (LASIX) 20 MG tablet Take 20 mg by mouth 2 (two) times daily.    lisinopril (PRINIVIL,ZESTRIL) 20 MG tablet Take 20 mg by mouth once daily.    metoprolol succinate (TOPROL-XL) 50 MG 24 hr tablet Take 50 mg by mouth once daily.    propranolol (INDERAL LA) 160 mg 24 hr capsule     triamterene-hydrochlorothiazide 37.5-25 mg (DYAZIDE) 37.5-25 mg per capsule Take 1 capsule by mouth every morning.          While in the hospital, will manage blood pressure as follows; resume home blood pressure medications. Labile blood pressure, will continue current medications    Will utilize p.r.n. blood pressure medication only if patient's blood pressure greater than  180/110 and she develops symptoms such as worsening chest pain or shortness of breath.

## 2022-03-27 NOTE — PROGRESS NOTES
[facility] Medicine  Progress Note    Patient Name: Syl Matos  MRN: [id number]  Patient Class: IP- Inpatient   Admission Date: 3/25/2022  Length of Stay: 1 days  Attending Physician: Lexy Kimbrough MD  Primary Care Provider: Luis Diaz MD        Subjective:     Principal Problem:Pneumonia of right lower lobe due to infectious organism        HPI:  Patient is a 66-year-old female with past medical history of diabetes, hyperlipidemia, hypertension who presents to the ER with complaints of fever, cough, shortness of breath over the last several days that worsened last night.  Her sister has also been sick with similar symptoms.  Symptoms started with sneezing, sore throat and seemed to progress with shortness of breath and a deep productive cough.  She came to the ER today because of fever and worsening lethargy, cough.  In the ER, she was febrile to 101.4, tachypneic and hypoxic requiring supplemental oxygen.  Chest x-ray showed right lower lobe pneumonia.  Hospital team called for admission.      Overview/Hospital Course:  No notes on file    Interval History: febrile overnight, persistent hypoxia    Review of Systems   Constitutional:  Positive for appetite change, fatigue and fever.   HENT: Negative.     Eyes:  Negative for visual disturbance.   Respiratory:  Positive for cough, shortness of breath and wheezing.    Cardiovascular:  Negative for chest pain.   Gastrointestinal:  Negative for abdominal pain.   Endocrine: Negative.    Genitourinary: Negative.    Musculoskeletal: Negative.    Skin: Negative.    Allergic/Immunologic: Negative.    Neurological: Negative.    Hematological: Negative.    Psychiatric/Behavioral: Negative.     Objective:     Vital Signs (Most Recent):  Temp: 97.1 °F (36.2 °C) (03/27/22 0711)  Pulse: 71 (03/27/22 0748)  Resp: 18 (03/27/22 0748)  BP: (!) 144/63 (03/27/22 0711)  SpO2: 97 % (03/27/22 0748)   Vital Signs (24h Range):  Temp:  [97.1 °F (36.2  °C)-100.8 °F (38.2 °C)] 97.1 °F (36.2 °C)  Pulse:  [67-82] 71  Resp:  [16-20] 18  SpO2:  [91 %-97 %] 97 %  BP: (113-159)/(54-67) 144/63     Weight: 90.7 kg (200 lb)  Body mass index is 33.28 kg/m².    Intake/Output Summary (Last 24 hours) at 3/27/2022 0911  Last data filed at 3/27/2022 0300  Gross per 24 hour   Intake 480 ml   Output --   Net 480 ml      Physical Exam  Vitals reviewed.   Constitutional:       General: She is not in acute distress.     Appearance: She is obese. She is ill-appearing. She is not toxic-appearing or diaphoretic.   HENT:      Head: Normocephalic and atraumatic.      Right Ear: External ear normal.      Left Ear: External ear normal.      Nose: Nose normal.      Mouth/Throat:      Mouth: Mucous membranes are dry.   Eyes:      Conjunctiva/sclera: Conjunctivae normal.   Cardiovascular:      Rate and Rhythm: Normal rate and regular rhythm.      Heart sounds: No murmur heard.    No gallop.   Pulmonary:      Effort: No respiratory distress.      Breath sounds: Wheezing, rhonchi and rales (diffuse) present.   Abdominal:      General: Bowel sounds are normal. There is no distension.      Tenderness: There is no abdominal tenderness.   Musculoskeletal:         General: No signs of injury.   Skin:     General: Skin is warm and dry.   Neurological:      Mental Status: She is alert. Mental status is at baseline.   Psychiatric:         Mood and Affect: Mood normal.       Significant Labs: All pertinent labs within the past 24 hours have been reviewed.  CBC:   Recent Labs   Lab 03/25/22  1124 03/26/22  0624 03/27/22  0619   WBC 13.08* 17.98* 15.26*   HGB 12.5 10.5* 10.1*   HCT 38.3 31.9* 31.4*    181 206     CMP:   Recent Labs   Lab 03/25/22  1124 03/26/22  0624 03/27/22  0619    138 141   K 3.6 3.4* 3.7    101 104   CO2 24 24 28   * 123* 145*   BUN 17 17 11   CREATININE 0.9 0.8 0.8   CALCIUM 9.1 8.6* 9.1   PROT 7.6  --   --    ALBUMIN 3.4*  --   --    BILITOT 0.8  --   --     ALKPHOS 63  --   --    AST 19  --   --    ALT 18  --   --    ANIONGAP 11 13 9   EGFRNONAA >60.0 >60.0 >60.0       Significant Imaging: I have reviewed all pertinent imaging results/findings within the past 24 hours.  X-Ray Chest AP Portable   Final Result   Abnormal      1. Pulmonary hypoinflation limits evaluation   2. Increased markings within the right lower lobe suspicious for developing pulmonary infiltrate.  Correlate clinically with possible fever and/or elevated white count.   3. Suspected mild underlying congestive heart failure small bilateral pleural effusions.   Close interval follow-up is recommended.      This report was flagged in Epic as abnormal.         Electronically signed by: Dorian Wadsworth   Date:    03/25/2022   Time:    11:51      X-Ray Chest PA And Lateral    (Results Pending)     CXR ordered      Assessment/Plan:      * Pneumonia of right lower lobe due to infectious organism  Possibly viral vs bacterial  Febrile on admission to [facility].4. lactic normal. Remains febrile for >48 hours. Viral respiratory panel ordered. Sputum cultures with gram positive cocci, will add MRSA coverage  Repeat CXR today  F/u blood cultures  duonebs scheduled  Continue IV steroids  Continue IV levaquin  Daily labs - CBC, CMP  Supplemental oxygen to maintain sats >94%      Mixed diabetic hyperlipidemia associated with type 2 diabetes mellitus  See above, continue statin therapy      Essential hypertension  Chronic, uncontrolled.  Latest blood pressure and vitals reviewed-   Temp:  [97.1 °F (36.2 °C)-100.8 °F (38.2 °C)]   Pulse:  [67-82]   Resp:  [16-20]   BP: (113-159)/(54-67)   SpO2:  [91 %-97 %] .   Home meds for hypertension were reviewed and noted below.   Hypertension Medications             amLODIPine (NORVASC) 10 MG tablet Take 1 tablet (10 mg total) by mouth once daily.    carvedilol (COREG) 12.5 MG tablet TK 1 T PO BID    furosemide (LASIX) 20 MG tablet Take 20 mg by mouth 2 (two) times daily.    lisinopril  (PRINIVIL,ZESTRIL) 20 MG tablet Take 20 mg by mouth once daily.    metoprolol succinate (TOPROL-XL) 50 MG 24 hr tablet Take 50 mg by mouth once daily.    propranolol (INDERAL LA) 160 mg 24 hr capsule     triamterene-hydrochlorothiazide 37.5-25 mg (DYAZIDE) 37.5-25 mg per capsule Take 1 capsule by mouth every morning.          While in the hospital, will manage blood pressure as follows; resume home blood pressure medications. Labile blood pressure, will continue current medications    Will utilize p.r.n. blood pressure medication only if patient's blood pressure greater than  180/110 and she develops symptoms such as worsening chest pain or shortness of breath.      Type 2 diabetes mellitus with hyperglycemia, with long-term current use of insulin  Patient's FSGs are uncontrolled due to hyperglycemia on current medication regimen. Improving now  Last A1c reviewed-   Lab Results   Component Value Date    HGBA1C 8.3 (H) 01/14/2021     Most recent fingerstick glucose reviewed-   Recent Labs   Lab 03/26/22  1111 03/26/22  1613 03/26/22  2015 03/27/22  0715   POCTGLUCOSE 199* 300* 176* 141*     Current correctional scale  Medium  Decrease anti-hyperglycemic dose as follows- decrease basal insulin by 50% initially  Antihyperglycemics (From admission, onward)            Start     Stop Route Frequency Ordered    03/25/22 2100  insulin detemir U-100 pen 30 Units         -- SubQ Nightly 03/25/22 1410    03/25/22 1414  insulin aspart U-100 pen 1-10 Units         -- SubQ Before meals & nightly PRN 03/25/22 1410        Hold Oral hypoglycemics while patient is in the hospital.          VTE Risk Mitigation (From admission, onward)         Ordered     heparin (porcine) injection 5,000 Units  Every 8 hours         03/25/22 1410     IP VTE HIGH RISK PATIENT  Once         03/25/22 1410     Place sequential compression device  Until discontinued         03/25/22 1410                Discharge Planning   MAGNOLIA:      Code Status: Full Code    Is the patient medically ready for discharge?:     Reason for patient still in hospital (select all that apply): Treatment  Discharge Plan A: Home                  Lexy Kimbrough MD  Department of [facility] Medicine   [facility]

## 2022-03-27 NOTE — ASSESSMENT & PLAN NOTE
Patient's FSGs are uncontrolled due to hyperglycemia on current medication regimen. Improving now  Last A1c reviewed-   Lab Results   Component Value Date    HGBA1C 8.3 (H) 01/14/2021     Most recent fingerstick glucose reviewed-   Recent Labs   Lab 03/26/22  1111 03/26/22  1613 03/26/22 2015 03/27/22  0715   POCTGLUCOSE 199* 300* 176* 141*     Current correctional scale  Medium  Decrease anti-hyperglycemic dose as follows- decrease basal insulin by 50% initially  Antihyperglycemics (From admission, onward)            Start     Stop Route Frequency Ordered    03/25/22 2100  insulin detemir U-100 pen 30 Units         -- SubQ Nightly 03/25/22 1410    03/25/22 1414  insulin aspart U-100 pen 1-10 Units         -- SubQ Before meals & nightly PRN 03/25/22 1410        Hold Oral hypoglycemics while patient is in the hospital.

## 2022-03-27 NOTE — PLAN OF CARE
Problem: Adult Inpatient Plan of Care  Goal: Absence of Hospital-Acquired Illness or Injury  Outcome: Ongoing, Progressing  Goal: Optimal Comfort and Wellbeing  Outcome: Ongoing, Progressing     Problem: Diabetes Comorbidity  Goal: Blood Glucose Level Within Targeted Range  Outcome: Ongoing, Progressing     Problem: Skin Injury Risk Increased  Goal: Skin Health and Integrity  Outcome: Ongoing, Progressing     Problem: Infection (Pneumonia)  Goal: Resolution of Infection Signs and Symptoms  Outcome: Ongoing, Progressing     Problem: Respiratory Compromise (Pneumonia)  Goal: Effective Oxygenation and Ventilation  Outcome: Ongoing, Progressing

## 2022-03-27 NOTE — ASSESSMENT & PLAN NOTE
Possibly viral vs bacterial  Febrile on admission to 101.4. lactic normal. Remains febrile for >48 hours. Viral respiratory panel ordered. Sputum cultures with gram positive cocci, will add MRSA coverage  Repeat CXR today  F/u blood cultures  duonebs scheduled  Continue IV steroids  Continue IV levaquin  Daily labs - CBC, CMP  Supplemental oxygen to maintain sats >94%

## 2022-03-27 NOTE — SUBJECTIVE & OBJECTIVE
Interval History: febrile overnight, persistent hypoxia    Review of Systems   Constitutional:  Positive for appetite change, fatigue and fever.   HENT: Negative.     Eyes:  Negative for visual disturbance.   Respiratory:  Positive for cough, shortness of breath and wheezing.    Cardiovascular:  Negative for chest pain.   Gastrointestinal:  Negative for abdominal pain.   Endocrine: Negative.    Genitourinary: Negative.    Musculoskeletal: Negative.    Skin: Negative.    Allergic/Immunologic: Negative.    Neurological: Negative.    Hematological: Negative.    Psychiatric/Behavioral: Negative.     Objective:     Vital Signs (Most Recent):  Temp: 97.1 °F (36.2 °C) (03/27/22 0711)  Pulse: 71 (03/27/22 0748)  Resp: 18 (03/27/22 0748)  BP: (!) 144/63 (03/27/22 0711)  SpO2: 97 % (03/27/22 0748)   Vital Signs (24h Range):  Temp:  [97.1 °F (36.2 °C)-100.8 °F (38.2 °C)] 97.1 °F (36.2 °C)  Pulse:  [67-82] 71  Resp:  [16-20] 18  SpO2:  [91 %-97 %] 97 %  BP: (113-159)/(54-67) 144/63     Weight: 90.7 kg (200 lb)  Body mass index is 33.28 kg/m².    Intake/Output Summary (Last 24 hours) at 3/27/2022 0911  Last data filed at 3/27/2022 0300  Gross per 24 hour   Intake 480 ml   Output --   Net 480 ml      Physical Exam  Vitals reviewed.   Constitutional:       General: She is not in acute distress.     Appearance: She is obese. She is ill-appearing. She is not toxic-appearing or diaphoretic.   HENT:      Head: Normocephalic and atraumatic.      Right Ear: External ear normal.      Left Ear: External ear normal.      Nose: Nose normal.      Mouth/Throat:      Mouth: Mucous membranes are dry.   Eyes:      Conjunctiva/sclera: Conjunctivae normal.   Cardiovascular:      Rate and Rhythm: Normal rate and regular rhythm.      Heart sounds: No murmur heard.    No gallop.   Pulmonary:      Effort: No respiratory distress.      Breath sounds: Wheezing, rhonchi and rales (diffuse) present.   Abdominal:      General: Bowel sounds are normal.  There is no distension.      Tenderness: There is no abdominal tenderness.   Musculoskeletal:         General: No signs of injury.   Skin:     General: Skin is warm and dry.   Neurological:      Mental Status: She is alert. Mental status is at baseline.   Psychiatric:         Mood and Affect: Mood normal.       Significant Labs: All pertinent labs within the past 24 hours have been reviewed.  CBC:   Recent Labs   Lab 03/25/22  1124 03/26/22  0624 03/27/22  0619   WBC 13.08* 17.98* 15.26*   HGB 12.5 10.5* 10.1*   HCT 38.3 31.9* 31.4*    181 206     CMP:   Recent Labs   Lab 03/25/22  1124 03/26/22  0624 03/27/22  0619    138 141   K 3.6 3.4* 3.7    101 104   CO2 24 24 28   * 123* 145*   BUN 17 17 11   CREATININE 0.9 0.8 0.8   CALCIUM 9.1 8.6* 9.1   PROT 7.6  --   --    ALBUMIN 3.4*  --   --    BILITOT 0.8  --   --    ALKPHOS 63  --   --    AST 19  --   --    ALT 18  --   --    ANIONGAP 11 13 9   EGFRNONAA >60.0 >60.0 >60.0       Significant Imaging: I have reviewed all pertinent imaging results/findings within the past 24 hours.  X-Ray Chest AP Portable   Final Result   Abnormal      1. Pulmonary hypoinflation limits evaluation   2. Increased markings within the right lower lobe suspicious for developing pulmonary infiltrate.  Correlate clinically with possible fever and/or elevated white count.   3. Suspected mild underlying congestive heart failure small bilateral pleural effusions.   Close interval follow-up is recommended.      This report was flagged in Epic as abnormal.         Electronically signed by: Dorian Wadsworth   Date:    03/25/2022   Time:    11:51      X-Ray Chest PA And Lateral    (Results Pending)     CXR ordered

## 2022-03-28 LAB
ANION GAP SERPL CALC-SCNC: 11 MMOL/L (ref 8–16)
ANISOCYTOSIS BLD QL SMEAR: ABNORMAL
BASOPHILS NFR BLD: 0 % (ref 0–1.9)
BUN SERPL-MCNC: 10 MG/DL (ref 8–23)
CALCIUM SERPL-MCNC: 9.2 MG/DL (ref 8.7–10.5)
CHLORIDE SERPL-SCNC: 104 MMOL/L (ref 95–110)
CO2 SERPL-SCNC: 27 MMOL/L (ref 23–29)
CREAT SERPL-MCNC: 0.8 MG/DL (ref 0.5–1.4)
DIFFERENTIAL METHOD: ABNORMAL
EOSINOPHIL NFR BLD: 2 % (ref 0–8)
ERYTHROCYTE [DISTWIDTH] IN BLOOD BY AUTOMATED COUNT: 13.7 % (ref 11.5–14.5)
EST. GFR  (AFRICAN AMERICAN): >60 ML/MIN/1.73 M^2
EST. GFR  (NON AFRICAN AMERICAN): >60 ML/MIN/1.73 M^2
GLUCOSE SERPL-MCNC: 135 MG/DL (ref 70–110)
HCT VFR BLD AUTO: 30.7 % (ref 37–48.5)
HGB BLD-MCNC: 10 G/DL (ref 12–16)
IMM GRANULOCYTES # BLD AUTO: ABNORMAL K/UL (ref 0–0.04)
IMM GRANULOCYTES NFR BLD AUTO: ABNORMAL % (ref 0–0.5)
LYMPHOCYTES NFR BLD: 19 % (ref 18–48)
MAGNESIUM SERPL-MCNC: 2 MG/DL (ref 1.6–2.6)
MCH RBC QN AUTO: 29.9 PG (ref 27–31)
MCHC RBC AUTO-ENTMCNC: 32.6 G/DL (ref 32–36)
MCV RBC AUTO: 92 FL (ref 82–98)
METAMYELOCYTES NFR BLD MANUAL: 2 %
MONOCYTES NFR BLD: 4 % (ref 4–15)
NEUTROPHILS NFR BLD: 55 % (ref 38–73)
NEUTS BAND NFR BLD MANUAL: 18 %
NRBC BLD-RTO: 0 /100 WBC
PLATELET # BLD AUTO: 205 K/UL (ref 150–450)
PLATELET BLD QL SMEAR: ABNORMAL
PMV BLD AUTO: 10.2 FL (ref 9.2–12.9)
POCT GLUCOSE: 153 MG/DL (ref 70–110)
POCT GLUCOSE: 198 MG/DL (ref 70–110)
POCT GLUCOSE: 234 MG/DL (ref 70–110)
POCT GLUCOSE: 260 MG/DL (ref 70–110)
POIKILOCYTOSIS BLD QL SMEAR: SLIGHT
POTASSIUM SERPL-SCNC: 4 MMOL/L (ref 3.5–5.1)
RBC # BLD AUTO: 3.35 M/UL (ref 4–5.4)
SODIUM SERPL-SCNC: 142 MMOL/L (ref 136–145)
TOXIC GRANULES BLD QL SMEAR: PRESENT
VANCOMYCIN TROUGH SERPL-MCNC: 11.2 UG/ML (ref 10–22)
WBC # BLD AUTO: 13.51 K/UL (ref 3.9–12.7)

## 2022-03-28 PROCEDURE — 99900035 HC TECH TIME PER 15 MIN (STAT)

## 2022-03-28 PROCEDURE — 63600175 PHARM REV CODE 636 W HCPCS: Performed by: FAMILY MEDICINE

## 2022-03-28 PROCEDURE — 36415 COLL VENOUS BLD VENIPUNCTURE: CPT | Performed by: FAMILY MEDICINE

## 2022-03-28 PROCEDURE — 27000221 HC OXYGEN, UP TO 24 HOURS

## 2022-03-28 PROCEDURE — 94640 AIRWAY INHALATION TREATMENT: CPT

## 2022-03-28 PROCEDURE — 99233 SBSQ HOSP IP/OBS HIGH 50: CPT | Mod: ,,, | Performed by: FAMILY MEDICINE

## 2022-03-28 PROCEDURE — 85027 COMPLETE CBC AUTOMATED: CPT | Performed by: FAMILY MEDICINE

## 2022-03-28 PROCEDURE — 94761 N-INVAS EAR/PLS OXIMETRY MLT: CPT

## 2022-03-28 PROCEDURE — 85007 BL SMEAR W/DIFF WBC COUNT: CPT | Performed by: FAMILY MEDICINE

## 2022-03-28 PROCEDURE — 80048 BASIC METABOLIC PNL TOTAL CA: CPT | Performed by: FAMILY MEDICINE

## 2022-03-28 PROCEDURE — 80202 ASSAY OF VANCOMYCIN: CPT | Performed by: FAMILY MEDICINE

## 2022-03-28 PROCEDURE — 97535 SELF CARE MNGMENT TRAINING: CPT

## 2022-03-28 PROCEDURE — 99233 PR SUBSEQUENT HOSPITAL CARE,LEVL III: ICD-10-PCS | Mod: ,,, | Performed by: FAMILY MEDICINE

## 2022-03-28 PROCEDURE — 21400001 HC TELEMETRY ROOM

## 2022-03-28 PROCEDURE — 25000003 PHARM REV CODE 250: Performed by: HOSPITALIST

## 2022-03-28 PROCEDURE — 94799 UNLISTED PULMONARY SVC/PX: CPT

## 2022-03-28 PROCEDURE — 97162 PT EVAL MOD COMPLEX 30 MIN: CPT

## 2022-03-28 PROCEDURE — 25000242 PHARM REV CODE 250 ALT 637 W/ HCPCS: Performed by: FAMILY MEDICINE

## 2022-03-28 PROCEDURE — 25000003 PHARM REV CODE 250: Performed by: FAMILY MEDICINE

## 2022-03-28 PROCEDURE — 83735 ASSAY OF MAGNESIUM: CPT | Performed by: FAMILY MEDICINE

## 2022-03-28 RX ORDER — HYDRALAZINE HYDROCHLORIDE 25 MG/1
25 TABLET, FILM COATED ORAL EVERY 8 HOURS PRN
Status: DISCONTINUED | OUTPATIENT
Start: 2022-03-28 | End: 2022-03-29

## 2022-03-28 RX ADMIN — IPRATROPIUM BROMIDE AND ALBUTEROL SULFATE 3 ML: 2.5; .5 SOLUTION RESPIRATORY (INHALATION) at 12:03

## 2022-03-28 RX ADMIN — GABAPENTIN 600 MG: 300 CAPSULE ORAL at 08:03

## 2022-03-28 RX ADMIN — HEPARIN SODIUM 5000 UNITS: 5000 INJECTION, SOLUTION INTRAVENOUS; SUBCUTANEOUS at 01:03

## 2022-03-28 RX ADMIN — LEVOFLOXACIN 750 MG: 750 INJECTION, SOLUTION INTRAVENOUS at 01:03

## 2022-03-28 RX ADMIN — HEPARIN SODIUM 5000 UNITS: 5000 INJECTION, SOLUTION INTRAVENOUS; SUBCUTANEOUS at 05:03

## 2022-03-28 RX ADMIN — VANCOMYCIN HYDROCHLORIDE 1000 MG: 1 INJECTION, POWDER, LYOPHILIZED, FOR SOLUTION INTRAVENOUS at 09:03

## 2022-03-28 RX ADMIN — IPRATROPIUM BROMIDE AND ALBUTEROL SULFATE 3 ML: 2.5; .5 SOLUTION RESPIRATORY (INHALATION) at 07:03

## 2022-03-28 RX ADMIN — ATORVASTATIN CALCIUM 20 MG: 10 TABLET, FILM COATED ORAL at 08:03

## 2022-03-28 RX ADMIN — GUAIFENESIN 600 MG: 600 TABLET, EXTENDED RELEASE ORAL at 08:03

## 2022-03-28 RX ADMIN — CARVEDILOL 12.5 MG: 12.5 TABLET, FILM COATED ORAL at 08:03

## 2022-03-28 RX ADMIN — IPRATROPIUM BROMIDE AND ALBUTEROL SULFATE 3 ML: 2.5; .5 SOLUTION RESPIRATORY (INHALATION) at 01:03

## 2022-03-28 RX ADMIN — IPRATROPIUM BROMIDE AND ALBUTEROL SULFATE 3 ML: 2.5; .5 SOLUTION RESPIRATORY (INHALATION) at 08:03

## 2022-03-28 RX ADMIN — FLUTICASONE FUROATE AND VILANTEROL TRIFENATATE 1 PUFF: 100; 25 POWDER RESPIRATORY (INHALATION) at 08:03

## 2022-03-28 RX ADMIN — LISINOPRIL 20 MG: 10 TABLET ORAL at 08:03

## 2022-03-28 RX ADMIN — TRAZODONE HYDROCHLORIDE 25 MG: 50 TABLET ORAL at 09:03

## 2022-03-28 RX ADMIN — INSULIN DETEMIR 30 UNITS: 100 INJECTION, SOLUTION SUBCUTANEOUS at 08:03

## 2022-03-28 RX ADMIN — CARVEDILOL 12.5 MG: 12.5 TABLET, FILM COATED ORAL at 04:03

## 2022-03-28 RX ADMIN — VANCOMYCIN HYDROCHLORIDE 1000 MG: 1 INJECTION, POWDER, LYOPHILIZED, FOR SOLUTION INTRAVENOUS at 10:03

## 2022-03-28 RX ADMIN — ASPIRIN 81 MG: 81 TABLET, CHEWABLE ORAL at 08:03

## 2022-03-28 RX ADMIN — INSULIN ASPART 4 UNITS: 100 INJECTION, SOLUTION INTRAVENOUS; SUBCUTANEOUS at 04:03

## 2022-03-28 RX ADMIN — BUSPIRONE HYDROCHLORIDE 15 MG: 5 TABLET ORAL at 08:03

## 2022-03-28 RX ADMIN — OXYBUTYNIN CHLORIDE 5 MG: 5 TABLET ORAL at 08:03

## 2022-03-28 RX ADMIN — HYDRALAZINE HYDROCHLORIDE 25 MG: 25 TABLET, FILM COATED ORAL at 08:03

## 2022-03-28 RX ADMIN — INSULIN ASPART 6 UNITS: 100 INJECTION, SOLUTION INTRAVENOUS; SUBCUTANEOUS at 12:03

## 2022-03-28 RX ADMIN — HEPARIN SODIUM 5000 UNITS: 5000 INJECTION, SOLUTION INTRAVENOUS; SUBCUTANEOUS at 09:03

## 2022-03-28 NOTE — SUBJECTIVE & OBJECTIVE
Interval History: fever curve trending down, improving hypoxia, starting to feel better    Review of Systems   Constitutional:  Positive for fatigue. Negative for fever.   HENT: Negative.     Eyes:  Negative for visual disturbance.   Respiratory:  Positive for cough, shortness of breath and wheezing.    Cardiovascular:  Negative for chest pain.   Gastrointestinal:  Negative for abdominal pain.   Endocrine: Negative.    Genitourinary: Negative.    Musculoskeletal: Negative.    Skin: Negative.    Allergic/Immunologic: Negative.    Neurological: Negative.    Hematological: Negative.    Psychiatric/Behavioral: Negative.     Objective:     Vital Signs (Most Recent):  Temp: 96.9 °F (36.1 °C) (03/28/22 1626)  Pulse: 67 (03/28/22 1626)  Resp: 18 (03/28/22 1626)  BP: (!) 167/72 (03/28/22 1626)  SpO2: (!) 94 % (03/28/22 1626)   Vital Signs (24h Range):  Temp:  [96.9 °F (36.1 °C)-98.9 °F (37.2 °C)] 96.9 °F (36.1 °C)  Pulse:  [57-73] 67  Resp:  [14-20] 18  SpO2:  [92 %-98 %] 94 %  BP: (145-178)/(64-72) 167/72     Weight: 90.7 kg (200 lb)  Body mass index is 33.28 kg/m².  No intake or output data in the 24 hours ending 03/28/22 1706   Physical Exam  Vitals reviewed.   Constitutional:       General: She is not in acute distress.     Appearance: She is obese. She is not toxic-appearing or diaphoretic.   HENT:      Head: Normocephalic and atraumatic.      Right Ear: External ear normal.      Left Ear: External ear normal.      Nose: Nose normal.      Mouth/Throat:      Mouth: Mucous membranes are moist.   Eyes:      Conjunctiva/sclera: Conjunctivae normal.   Cardiovascular:      Rate and Rhythm: Normal rate and regular rhythm.      Heart sounds: No murmur heard.    No gallop.   Pulmonary:      Effort: No respiratory distress.      Breath sounds: Wheezing, rhonchi and rales (diffuse) present.      Comments: Pulmonary exam with persistent rales/wheezing  Abdominal:      General: Bowel sounds are normal. There is no distension.       Tenderness: There is no abdominal tenderness.   Musculoskeletal:         General: No signs of injury.   Skin:     General: Skin is warm and dry.   Neurological:      Mental Status: She is alert. Mental status is at baseline.   Psychiatric:         Mood and Affect: Mood normal.       Significant Labs: All pertinent labs within the past 24 hours have been reviewed.  CBC:   Recent Labs   Lab 03/27/22  0619 03/28/22  0606   WBC 15.26* 13.51*   HGB 10.1* 10.0*   HCT 31.4* 30.7*    205     CMP:   Recent Labs   Lab 03/27/22  0619 03/28/22  0606    142   K 3.7 4.0    104   CO2 28 27   * 135*   BUN 11 10   CREATININE 0.8 0.8   CALCIUM 9.1 9.2   ANIONGAP 9 11   EGFRNONAA >60.0 >60.0     Microbiology Results (last 7 days)       Procedure Component Value Units Date/Time    Culture, Respiratory with Gram Stain [[id number]]  (Abnormal) Collected: 03/25/22 2120    Order Status: Completed Specimen: Respiratory from Sputum, Expectorated Updated: 03/28/22 1115     Respiratory Culture No Pseudomonas isolated.      STAPHYLOCOCCUS AUREUS  Rare  Susceptibility pending  Normal respiratory ondina also present       Gram Stain (Respiratory) <10 epithelial cells per low power field.     Gram Stain (Respiratory) Many WBC's     Gram Stain (Respiratory) Few Gram positive cocci    Blood culture x two cultures. Draw prior to antibiotics. [[id number]] Collected: 03/25/22 1134    Order Status: Completed Specimen: Blood from Peripheral, Hand, Right Updated: 03/27/22 2012     Blood Culture, Routine No Growth to date      No Growth to date      No Growth to date    Narrative:      Aerobic and anaerobic    Blood culture x two cultures. Draw prior to antibiotics. [[id number]] Collected: 03/25/22 1125    Order Status: Completed Specimen: Blood from Peripheral, Forearm, Right Updated: 03/27/22 2012     Blood Culture, Routine No Growth to date      No Growth to date      No Growth to date    Narrative:      Aerobic and anaerobic     Respiratory Infection Panel (PCR), Nasopharyngeal [[id number]]  (Abnormal) Collected: 03/27/22 0831    Order Status: Completed Specimen: Nasopharyngeal Swab Updated: 03/27/22 1805     Respiratory Infection Panel Source NP swab     Adenovirus Not Detected     Coronavirus 229E, Common Cold Virus Not Detected     Coronavirus HKU1, Common Cold Virus Not Detected     Coronavirus NL63, Common Cold Virus Not Detected     Coronavirus OC43, Common Cold Virus Not Detected     Comment: The Coronavirus strains detected in this test cause the common cold.  These strains are not the COVID-19 (novel Coronavirus)strain   associated with the respiratory disease outbreak.          SARS-CoV2 (COVID-19) Qualitative PCR Not Detected     Human Metapneumovirus Not Detected     Human Rhinovirus/Enterovirus Detected     Influenza A (subtypes H1, H1-2009,H3) Not Detected     Influenza B Not Detected     Parainfluenza Virus 1 Not Detected     Parainfluenza Virus 2 Not Detected     Parainfluenza Virus 3 Not Detected     Parainfluenza Virus 4 Not Detected     Respiratory Syncytial Virus Not Detected     Bordetella Parapertussis (CY1759) Not Detected     Bordetella pertussis (ptxP) Not Detected     Chlamydia pneumoniae Not Detected     Mycoplasma pneumoniae Not Detected    Narrative:      For all other respiratory sources, order [id number] -  Respiratory Viral Panel by PCR  Respiratory Infection Panel source->NP Swab    Influenza A & B by Molecular [[id number]] Collected: 03/25/22 1124    Order Status: Completed Specimen: Nasopharyngeal Swab Updated: 03/25/22 1158     Influenza A, Molecular Negative     Influenza B, Molecular Negative     Flu A & B Source Nasal Swab            Significant Imaging: I have reviewed all pertinent imaging results/findings within the past 24 hours.  X-Ray Chest PA And Lateral   Final Result      Bibasilar airspace disease is slightly more confluent.         Electronically signed by: Salvador Mata   Date:    03/27/2022    Time:    13:27      X-Ray Chest AP Portable   Final Result   Abnormal      1. Pulmonary hypoinflation limits evaluation   2. Increased markings within the right lower lobe suspicious for developing pulmonary infiltrate.  Correlate clinically with possible fever and/or elevated white count.   3. Suspected mild underlying congestive heart failure small bilateral pleural effusions.   Close interval follow-up is recommended.      This report was flagged in Epic as abnormal.         Electronically signed by: Dorian Wadsworth   Date:    03/25/2022   Time:    11:51

## 2022-03-28 NOTE — PROGRESS NOTES
[facility] Medicine  Progress Note    Patient Name: Syl Matos  MRN: [id number]  Patient Class: IP- Inpatient   Admission Date: 3/25/2022  Length of Stay: 2 days  Attending Physician: Lexy Kimbrough MD  Primary Care Provider: Luis Diaz MD        Subjective:     Principal Problem:Pneumonia of right lower lobe due to infectious organism        HPI:  Patient is a 66-year-old female with past medical history of diabetes, hyperlipidemia, hypertension who presents to the ER with complaints of fever, cough, shortness of breath over the last several days that worsened last night.  Her sister has also been sick with similar symptoms.  Symptoms started with sneezing, sore throat and seemed to progress with shortness of breath and a deep productive cough.  She came to the ER today because of fever and worsening lethargy, cough.  In the ER, she was febrile to 101.4, tachypneic and hypoxic requiring supplemental oxygen.  Chest x-ray showed right lower lobe pneumonia.  Hospital team called for admission.      Overview/Hospital Course:  No notes on file    Interval History: fever curve trending down, improving hypoxia, starting to feel better    Review of Systems   Constitutional:  Positive for fatigue. Negative for fever.   HENT: Negative.     Eyes:  Negative for visual disturbance.   Respiratory:  Positive for cough, shortness of breath and wheezing.    Cardiovascular:  Negative for chest pain.   Gastrointestinal:  Negative for abdominal pain.   Endocrine: Negative.    Genitourinary: Negative.    Musculoskeletal: Negative.    Skin: Negative.    Allergic/Immunologic: Negative.    Neurological: Negative.    Hematological: Negative.    Psychiatric/Behavioral: Negative.     Objective:     Vital Signs (Most Recent):  Temp: 96.9 °F (36.1 °C) (03/28/22 1626)  Pulse: 67 (03/28/22 1626)  Resp: 18 (03/28/22 1626)  BP: (!) 167/72 (03/28/22 1626)  SpO2: (!) 94 % (03/28/22 1626)   Vital Signs (24h  Range):  Temp:  [96.9 °F (36.1 °C)-98.9 °F (37.2 °C)] 96.9 °F (36.1 °C)  Pulse:  [57-73] 67  Resp:  [14-20] 18  SpO2:  [92 %-98 %] 94 %  BP: (145-178)/(64-72) 167/72     Weight: 90.7 kg (200 lb)  Body mass index is 33.28 kg/m².  No intake or output data in the 24 hours ending 03/28/22 1706   Physical Exam  Vitals reviewed.   Constitutional:       General: She is not in acute distress.     Appearance: She is obese. She is not toxic-appearing or diaphoretic.   HENT:      Head: Normocephalic and atraumatic.      Right Ear: External ear normal.      Left Ear: External ear normal.      Nose: Nose normal.      Mouth/Throat:      Mouth: Mucous membranes are moist.   Eyes:      Conjunctiva/sclera: Conjunctivae normal.   Cardiovascular:      Rate and Rhythm: Normal rate and regular rhythm.      Heart sounds: No murmur heard.    No gallop.   Pulmonary:      Effort: No respiratory distress.      Breath sounds: Wheezing, rhonchi and rales (diffuse) present.      Comments: Pulmonary exam with persistent rales/wheezing  Abdominal:      General: Bowel sounds are normal. There is no distension.      Tenderness: There is no abdominal tenderness.   Musculoskeletal:         General: No signs of injury.   Skin:     General: Skin is warm and dry.   Neurological:      Mental Status: She is alert. Mental status is at baseline.   Psychiatric:         Mood and Affect: Mood normal.       Significant Labs: All pertinent labs within the past 24 hours have been reviewed.  CBC:   Recent Labs   Lab 03/27/22 0619 03/28/22  0606   WBC 15.26* 13.51*   HGB 10.1* 10.0*   HCT 31.4* 30.7*    205     CMP:   Recent Labs   Lab 03/27/22  0619 03/28/22  0606    142   K 3.7 4.0    104   CO2 28 27   * 135*   BUN 11 10   CREATININE 0.8 0.8   CALCIUM 9.1 9.2   ANIONGAP 9 11   EGFRNONAA >60.0 >60.0     Microbiology Results (last 7 days)       Procedure Component Value Units Date/Time    Culture, Respiratory with Gram Stain [[id number]]   (Abnormal) Collected: 03/25/22 2120    Order Status: Completed Specimen: Respiratory from Sputum, Expectorated Updated: 03/28/22 1115     Respiratory Culture No Pseudomonas isolated.      STAPHYLOCOCCUS AUREUS  Rare  Susceptibility pending  Normal respiratory ondina also present       Gram Stain (Respiratory) <10 epithelial cells per low power field.     Gram Stain (Respiratory) Many WBC's     Gram Stain (Respiratory) Few Gram positive cocci    Blood culture x two cultures. Draw prior to antibiotics. [[id number]] Collected: 03/25/22 1134    Order Status: Completed Specimen: Blood from Peripheral, Hand, Right Updated: 03/27/22 2012     Blood Culture, Routine No Growth to date      No Growth to date      No Growth to date    Narrative:      Aerobic and anaerobic    Blood culture x two cultures. Draw prior to antibiotics. [[id number]] Collected: 03/25/22 1125    Order Status: Completed Specimen: Blood from Peripheral, Forearm, Right Updated: 03/27/22 2012     Blood Culture, Routine No Growth to date      No Growth to date      No Growth to date    Narrative:      Aerobic and anaerobic    Respiratory Infection Panel (PCR), Nasopharyngeal [[id number]]  (Abnormal) Collected: 03/27/22 0831    Order Status: Completed Specimen: Nasopharyngeal Swab Updated: 03/27/22 1805     Respiratory Infection Panel Source NP swab     Adenovirus Not Detected     Coronavirus 229E, Common Cold Virus Not Detected     Coronavirus HKU1, Common Cold Virus Not Detected     Coronavirus NL63, Common Cold Virus Not Detected     Coronavirus OC43, Common Cold Virus Not Detected     Comment: The Coronavirus strains detected in this test cause the common cold.  These strains are not the COVID-19 (novel Coronavirus)strain   associated with the respiratory disease outbreak.          SARS-CoV2 (COVID-19) Qualitative PCR Not Detected     Human Metapneumovirus Not Detected     Human Rhinovirus/Enterovirus Detected     Influenza A (subtypes H1, H1-2009,H3) Not  Detected     Influenza B Not Detected     Parainfluenza Virus 1 Not Detected     Parainfluenza Virus 2 Not Detected     Parainfluenza Virus 3 Not Detected     Parainfluenza Virus 4 Not Detected     Respiratory Syncytial Virus Not Detected     Bordetella Parapertussis (FU6303) Not Detected     Bordetella pertussis (ptxP) Not Detected     Chlamydia pneumoniae Not Detected     Mycoplasma pneumoniae Not Detected    Narrative:      For all other respiratory sources, order [id number] -  Respiratory Viral Panel by PCR  Respiratory Infection Panel source->NP Swab    Influenza A & B by Molecular [[id number]] Collected: 03/25/22 1124    Order Status: Completed Specimen: Nasopharyngeal Swab Updated: 03/25/22 1158     Influenza A, Molecular Negative     Influenza B, Molecular Negative     Flu A & B Source Nasal Swab            Significant Imaging: I have reviewed all pertinent imaging results/findings within the past 24 hours.  X-Ray Chest PA And Lateral   Final Result      Bibasilar airspace disease is slightly more confluent.         Electronically signed by: Salvador Mata   Date:    03/27/2022   Time:    13:27      X-Ray Chest AP Portable   Final Result   Abnormal      1. Pulmonary hypoinflation limits evaluation   2. Increased markings within the right lower lobe suspicious for developing pulmonary infiltrate.  Correlate clinically with possible fever and/or elevated white count.   3. Suspected mild underlying congestive heart failure small bilateral pleural effusions.   Close interval follow-up is recommended.      This report was flagged in Epic as abnormal.         Electronically signed by: Dorian Wadsworth   Date:    03/25/2022   Time:    11:51              Assessment/Plan:      * Pneumonia of right lower lobe due to infectious organism  Viral pneumonia - rhinovirus with superimposed bacterial pneumonia - staph aureus  Febrile on admission to 101.4. lactic normal. Remains febrile for >48 hours. Viral respiratory panel  ordered. Sputum cultures with gram positive cocci, added MRSA coverage with improvement in symptoms  F/u sputum cultures   duonebs scheduled  Continue IV steroids  Continue IV levaquin, Vanc  Daily labs - CBC, CMP  Supplemental oxygen to maintain sats >94%      Mixed diabetic hyperlipidemia associated with type 2 diabetes mellitus  See above, continue statin therapy      Essential hypertension  Chronic, uncontrolled.  Latest blood pressure and vitals reviewed-   Temp:  [97.1 °F (36.2 °C)-100.8 °F (38.2 °C)]   Pulse:  [67-82]   Resp:  [16-20]   BP: (113-159)/(54-67)   SpO2:  [91 %-97 %] .   Home meds for hypertension were reviewed and noted below.   Hypertension Medications             amLODIPine (NORVASC) 10 MG tablet Take 1 tablet (10 mg total) by mouth once daily.    carvedilol (COREG) 12.5 MG tablet TK 1 T PO BID    furosemide (LASIX) 20 MG tablet Take 20 mg by mouth 2 (two) times daily.    lisinopril (PRINIVIL,ZESTRIL) 20 MG tablet Take 20 mg by mouth once daily.    metoprolol succinate (TOPROL-XL) 50 MG 24 hr tablet Take 50 mg by mouth once daily.    propranolol (INDERAL LA) 160 mg 24 hr capsule     triamterene-hydrochlorothiazide 37.5-25 mg (DYAZIDE) 37.5-25 mg per capsule Take 1 capsule by mouth every morning.          While in the hospital, will manage blood pressure as follows; resume home blood pressure medications. Labile blood pressure, will continue current medications    Will utilize p.r.n. blood pressure medication only if patient's blood pressure greater than  180/110 and she develops symptoms such as worsening chest pain or shortness of breath.      Type 2 diabetes mellitus with hyperglycemia, with long-term current use of insulin  Patient's FSGs are uncontrolled due to hyperglycemia on current medication regimen. Improving now  Last A1c reviewed-   Lab Results   Component Value Date    HGBA1C 8.3 (H) 01/14/2021     Most recent fingerstick glucose reviewed-   Recent Labs   Lab 03/27/22 2016  03/28/22  0717 03/28/22  1244   POCTGLUCOSE 285* 153* 260*     Current correctional scale  Medium  Decrease anti-hyperglycemic dose as follows- decrease basal insulin by 50% initially  Antihyperglycemics (From admission, onward)            Start     Stop Route Frequency Ordered    03/25/22 2100  insulin detemir U-100 pen 30 Units         -- SubQ Nightly 03/25/22 1410    03/25/22 1414  insulin aspart U-100 pen 1-10 Units         -- SubQ Before meals & nightly PRN 03/25/22 1410        Hold Oral hypoglycemics while patient is in the hospital.          VTE Risk Mitigation (From admission, onward)         Ordered     heparin (porcine) injection 5,000 Units  Every 8 hours         03/25/22 1410     IP VTE HIGH RISK PATIENT  Once         03/25/22 1410     Place sequential compression device  Until discontinued         03/25/22 1410                Discharge Planning   MAGNOLIA:      Code Status: Full Code   Is the patient medically ready for discharge?:     Reason for patient still in hospital (select all that apply): Treatment  Discharge Plan A: Home                  Lexy Kimbrough MD  Department of Hospital Medicine   [facility]

## 2022-03-28 NOTE — PLAN OF CARE
Problem: Adult Inpatient Plan of Care  Goal: Absence of Hospital-Acquired Illness or Injury  Outcome: Ongoing, Progressing  Goal: Optimal Comfort and Wellbeing  Outcome: Ongoing, Progressing     Problem: Fluid Imbalance (Pneumonia)  Goal: Fluid Balance  Outcome: Ongoing, Progressing     Problem: Infection (Pneumonia)  Goal: Resolution of Infection Signs and Symptoms  Outcome: Ongoing, Progressing     Problem: Respiratory Compromise (Pneumonia)  Goal: Effective Oxygenation and Ventilation  Outcome: Ongoing, Progressing     Problem: Fall Injury Risk  Goal: Absence of Fall and Fall-Related Injury  Outcome: Ongoing, Progressing

## 2022-03-28 NOTE — ASSESSMENT & PLAN NOTE
Patient's FSGs are uncontrolled due to hyperglycemia on current medication regimen. Improving now  Last A1c reviewed-   Lab Results   Component Value Date    HGBA1C 8.3 (H) 01/14/2021     Most recent fingerstick glucose reviewed-   Recent Labs   Lab 03/27/22 2016 03/28/22  0717 03/28/22  1244   POCTGLUCOSE 285* 153* 260*     Current correctional scale  Medium  Decrease anti-hyperglycemic dose as follows- decrease basal insulin by 50% initially  Antihyperglycemics (From admission, onward)            Start     Stop Route Frequency Ordered    03/25/22 2100  insulin detemir U-100 pen 30 Units         -- SubQ Nightly 03/25/22 1410    03/25/22 1414  insulin aspart U-100 pen 1-10 Units         -- SubQ Before meals & nightly PRN 03/25/22 1410        Hold Oral hypoglycemics while patient is in the hospital.

## 2022-03-28 NOTE — ASSESSMENT & PLAN NOTE
Viral pneumonia - rhinovirus with superimposed bacterial pneumonia - staph aureus  Febrile on admission to 101.4. lactic normal. Remains febrile for >48 hours. Viral respiratory panel ordered. Sputum cultures with gram positive cocci, added MRSA coverage with improvement in symptoms  F/u sputum cultures   duonebs scheduled  Continue IV steroids  Continue IV levaquin, Vanc  Daily labs - CBC, CMP  Supplemental oxygen to maintain sats >94%

## 2022-03-28 NOTE — PT/OT/SLP EVAL
Physical Therapy Evaluation    Patient Name:  Syl Matos   MRN:  [id number]    Recommendations:     Discharge Recommendations:   (home with family support)   Discharge Equipment Recommendations:  (assessment ongoing)   Barriers to discharge: None    Assessment:     Syl Matos is a 66 y.o. female admitted with a medical diagnosis of Pneumonia of right lower lobe due to infectious organism.  She presents with the following impairments/functional limitations:  impaired endurance, weakness.    Rehab Prognosis: Good; patient would benefit from acute skilled PT services to address these deficits and reach maximum level of function.    Recent Surgery: * No surgery found *      Plan:     During this hospitalization, patient to be seen  (3-5 x/wk) to address the identified rehab impairments via gait training, therapeutic activities, therapeutic exercises and progress toward the following goals:    · Plan of Care Expires:   (upon facility discharge)    Subjective     Chief Complaint: weakness  Patient/Family Comments/goals: patient states she has been getting up and going to the bathroom by herself without difficulty  Pain/Comfort:  · Pain Rating 1:  (patient reporting right calf pain which has been present for some time (DVT previously r/o per patient), no numerical pain rating provided)    Patients cultural, spiritual, Congregational conflicts given the current situation: no    Living Environment:  Patient lives in a 1st floor handicapped apartment with her disabled adult daughter for whom she is the primary caregiver for.  Prior to admission, patients level of function was independent ADL's, does not drive so is dependent on family for transportation.  Equipment used at home: shower chair, nebulizer, grab bar.  DME owned (not currently used): none.  Upon discharge, patient will have assistance from family.    Objective:     Communicated with RN prior to session.  Patient found sitting edge of bed with peripheral  IV, pulse ox (continuous), telemetry  upon PT entry to room.    General Precautions: Standard,     Orthopedic Precautions:N/A   Braces: N/A  Respiratory Status: Room air    Exams:  · Cognitive Exam:  Patient is oriented to Person, Place, Time and Situation  · RLE ROM: WFL  · RLE Strength: in 4-/5 to 4/5 range  · LLE ROM: WFL  · LLE Strength: in 4-/5 to 4/5 range    Functional Mobility:  · Bed Mobility: activity did not occur as patient sitting up on side of bed at onset and conclusion of treatment session  · Transfers:  Sit to Stand:  contact guard assistance with no AD  · Gait: patient ambulated from bed to door of room then back with CGA using no AD    Patient Education Provided:              -PT roles, expectations, and POC               -Safety with mobility              -Benefits of OOB activities to increase strength and functional mobility               -Performing ther ex for increasing LE ROM and strength              -Discharge recommendations     AM-PAC 6 CLICK MOBILITY  Total Score:      Patient left sitting edge of bed with all lines intact, call button in reach and RN notified.    GOALS:   1. Patient to be mod I with all aspects of bed mobility  2. Patient to perform transfers with supervision  3. Patient to ambulate 150' with SBA using LRAD    History:     Past Medical History:   Diagnosis Date    Diabetes mellitus     Hypercholesteremia     Hypertension        Past Surgical History:   Procedure Laterality Date    FRACTURE SURGERY      pelvis surgery    TUBAL LIGATION         Time Tracking:     PT Received On: 03/28/22  PT Start Time: 1420     PT Stop Time: 1438  PT Total Time (min): 18 min     Billable Minutes: Evaluation 18 min      03/28/2022

## 2022-03-28 NOTE — PT/OT/SLP EVAL
Occupational Therapy   Evaluation    Name: Syl Matos  MRN: [id number]  Admitting Diagnosis:  Pneumonia of right lower lobe due to infectious organism  Recent Surgery: * No surgery found *      Recommendations:     Discharge Recommendations:  dc home w/ hh therapy  Discharge Equipment Recommendations:   RW   Barriers to discharge:    none     Assessment:     Syl Matos is a 66 y.o. female with a medical diagnosis of Pneumonia of right lower lobe due to infectious organism.  She presents with  Decreased ind w/ some adl tasks. Performance deficits affecting function:  decreased functional activity tolerance and decreased strength     Rehab Prognosis: Good; patient would benefit from acute skilled OT services to address these deficits and reach maximum level of function.       Plan:     Patient to be seen   3- 5x's per week to address the above listed problems via  o t intervention   · Plan of Care Expires:   upon hospital dc Plan of Care Reviewed with:   patient    Subjective     Chief Complaint: rhinovirus w/ resulting pneumonia.  Patient/Family Comments/goals: to go home to take care of my daughter  Occupational Profile:  Living Environment: Patient was living at home in a first floor, handicapped accessible apartment w/ her special needs daughter.       Previous level of function: Ind w/ all adls , required transportation to and from all appointments and grocery store from son secondary to patient has never driven.    Roles and Routines: self care and assisting her special needs daughter.   Equipment Used at Home:   shower chair.   Assistance upon Discharge: will have some assistance from son who lives nearby.     Pain/Comfort:  ·  patient states she is not in pain at this moment.     Patients cultural, spiritual, Yarsani conflicts given the current situation:  will not interfere w/ her therapy program.     Objective:     Communicated with:  nursingprior to session.  Patient found supine with   upon  OT entry to room.    General Precautions: Standard,     Orthopedic Precautions:    Braces:    Respiratory Status: Room air    Occupational Performance:    Bed Mobility:    · Patient completed Rolling/Turning to Left with  modified independence  · Patient completed Rolling/Turning to Right with modified independence  · Patient completed Scooting/Bridging with modified independence  · Patient completed Supine to Sit with modified independence  · Patient completed Sit to Supine with modified independence    Functional Mobility/Transfers:  · Patient completed Sit <> Stand Transfer with modified independence  with  no assistive device   · Patient completed Bed <> Chair Transfer using Stand Pivot technique with modified independence with hand-held assist  · Functional Mobility: sba w/ functional ambulation w/o use of RW ; HHA only.     Activities of Daily Living:  · Self feed w/ mod ind  · G/h w/ mod ind  · ub dressing w/ mod ind  ·  lb dressing w/ min a   · toileting w/ min to cga     Cognitive/Visual Perceptual:  Cognitive/Psychosocial Skills:     -       Oriented to: Person, Place, Time and Situation     Physical Exam:  Patient arom rt ue wfls all planes of ue movement  Patient's arom lt ue limited to 95 degrees shoulder flexion and abd as patient reported previous rotator cuff repair approx 6 years ago. All other ranges wfl's.  Patient w/ strength rue grossly 4/5;  Lt ue 3-/ 5 for shoulder flex and abd, and 3/5 all other ranges.     PATIENT W/ POOR FUNCTIONAL ACTIVITY TOLERANCE.     Treatment & Education:  Patient received initial ot evaluation this date.  Patient performed self care activities for lb dressing and for g/h from unsupported sit on eob.    Education:    Patient left supine with all lines intact    GOALS:   1. PATIENT WILL PERFORM  LB DRESSING W/ SUA  2. PATIENT WILL INCREASE FUNCTIONAL ACTIVITY TOLERANCE TO GOOD IN ORDER TO FACILITATE OPTIMAL ADL PERFORMANCE.   3. PATIENT WILL  PERFORM  TOILETING TASKS  W/ MOD IND.   History:     Past Medical History:   Diagnosis Date    Diabetes mellitus     Hypercholesteremia     Hypertension        Past Surgical History:   Procedure Laterality Date    FRACTURE SURGERY      pelvis surgery    TUBAL LIGATION         Time Tracking:     OT Date of Treatment:  03/28/2022  OT Start Time:  14:25  OT Stop Time:  14:50  OT Total Time (min):   25  Billable Minutes:Evaluation 15  Self Care/Home Management 10    3/28/2022

## 2022-03-28 NOTE — PLAN OF CARE
Patient in no apparent distress. Patient received on 2 lpm. O2 weaned to room air. Sat's   92-94% on room air. PRN treatments given. IS done . Will continue to monitor.

## 2022-03-29 LAB
BACTERIA SPEC AEROBE CULT: ABNORMAL
BACTERIA SPEC AEROBE CULT: ABNORMAL
BUN SERPL-MCNC: 12 MG/DL (ref 8–23)
CREAT SERPL-MCNC: 0.8 MG/DL (ref 0.5–1.4)
EST. GFR  (AFRICAN AMERICAN): >60 ML/MIN/1.73 M^2
EST. GFR  (NON AFRICAN AMERICAN): >60 ML/MIN/1.73 M^2
GRAM STN SPEC: ABNORMAL
POCT GLUCOSE: 192 MG/DL (ref 70–110)
POCT GLUCOSE: 270 MG/DL (ref 70–110)
POCT GLUCOSE: 336 MG/DL (ref 70–110)

## 2022-03-29 PROCEDURE — 25000003 PHARM REV CODE 250: Performed by: HOSPITALIST

## 2022-03-29 PROCEDURE — 97116 GAIT TRAINING THERAPY: CPT

## 2022-03-29 PROCEDURE — 99232 PR SUBSEQUENT HOSPITAL CARE,LEVL II: ICD-10-PCS | Mod: ,,, | Performed by: HOSPITALIST

## 2022-03-29 PROCEDURE — 94799 UNLISTED PULMONARY SVC/PX: CPT

## 2022-03-29 PROCEDURE — 99900035 HC TECH TIME PER 15 MIN (STAT)

## 2022-03-29 PROCEDURE — 94761 N-INVAS EAR/PLS OXIMETRY MLT: CPT

## 2022-03-29 PROCEDURE — 36415 COLL VENOUS BLD VENIPUNCTURE: CPT | Performed by: FAMILY MEDICINE

## 2022-03-29 PROCEDURE — 97530 THERAPEUTIC ACTIVITIES: CPT

## 2022-03-29 PROCEDURE — 82565 ASSAY OF CREATININE: CPT | Performed by: FAMILY MEDICINE

## 2022-03-29 PROCEDURE — 21400001 HC TELEMETRY ROOM

## 2022-03-29 PROCEDURE — 25000242 PHARM REV CODE 250 ALT 637 W/ HCPCS: Performed by: FAMILY MEDICINE

## 2022-03-29 PROCEDURE — 63600175 PHARM REV CODE 636 W HCPCS: Performed by: FAMILY MEDICINE

## 2022-03-29 PROCEDURE — 97110 THERAPEUTIC EXERCISES: CPT

## 2022-03-29 PROCEDURE — 25000003 PHARM REV CODE 250: Performed by: FAMILY MEDICINE

## 2022-03-29 PROCEDURE — 94640 AIRWAY INHALATION TREATMENT: CPT

## 2022-03-29 PROCEDURE — 94664 DEMO&/EVAL PT USE INHALER: CPT

## 2022-03-29 PROCEDURE — 63600175 PHARM REV CODE 636 W HCPCS: Performed by: HOSPITALIST

## 2022-03-29 PROCEDURE — 27000221 HC OXYGEN, UP TO 24 HOURS

## 2022-03-29 PROCEDURE — 27000646 HC AEROBIKA DEVICE

## 2022-03-29 PROCEDURE — 99232 SBSQ HOSP IP/OBS MODERATE 35: CPT | Mod: ,,, | Performed by: HOSPITALIST

## 2022-03-29 PROCEDURE — 84520 ASSAY OF UREA NITROGEN: CPT | Performed by: FAMILY MEDICINE

## 2022-03-29 RX ORDER — HYDRALAZINE HYDROCHLORIDE 20 MG/ML
20 INJECTION INTRAMUSCULAR; INTRAVENOUS EVERY 6 HOURS PRN
Status: DISCONTINUED | OUTPATIENT
Start: 2022-03-29 | End: 2022-03-31 | Stop reason: HOSPADM

## 2022-03-29 RX ORDER — BUSPIRONE HYDROCHLORIDE 5 MG/1
TABLET ORAL
Status: DISPENSED
Start: 2022-03-29 | End: 2022-03-29

## 2022-03-29 RX ORDER — AMLODIPINE BESYLATE 5 MG/1
5 TABLET ORAL DAILY
Status: DISCONTINUED | OUTPATIENT
Start: 2022-03-29 | End: 2022-03-31

## 2022-03-29 RX ADMIN — VANCOMYCIN HYDROCHLORIDE 1250 MG: 1.25 INJECTION, POWDER, LYOPHILIZED, FOR SOLUTION INTRAVENOUS at 10:03

## 2022-03-29 RX ADMIN — ASPIRIN 81 MG: 81 TABLET, CHEWABLE ORAL at 09:03

## 2022-03-29 RX ADMIN — VANCOMYCIN HYDROCHLORIDE 1250 MG: 1.25 INJECTION, POWDER, LYOPHILIZED, FOR SOLUTION INTRAVENOUS at 09:03

## 2022-03-29 RX ADMIN — GABAPENTIN 600 MG: 300 CAPSULE ORAL at 09:03

## 2022-03-29 RX ADMIN — HEPARIN SODIUM 5000 UNITS: 5000 INJECTION, SOLUTION INTRAVENOUS; SUBCUTANEOUS at 05:03

## 2022-03-29 RX ADMIN — INSULIN ASPART 2 UNITS: 100 INJECTION, SOLUTION INTRAVENOUS; SUBCUTANEOUS at 07:03

## 2022-03-29 RX ADMIN — ACETAMINOPHEN 650 MG: 325 TABLET ORAL at 10:03

## 2022-03-29 RX ADMIN — LEVOFLOXACIN 750 MG: 750 INJECTION, SOLUTION INTRAVENOUS at 05:03

## 2022-03-29 RX ADMIN — IPRATROPIUM BROMIDE AND ALBUTEROL SULFATE 3 ML: 2.5; .5 SOLUTION RESPIRATORY (INHALATION) at 07:03

## 2022-03-29 RX ADMIN — CARVEDILOL 12.5 MG: 12.5 TABLET, FILM COATED ORAL at 07:03

## 2022-03-29 RX ADMIN — INSULIN ASPART 6 UNITS: 100 INJECTION, SOLUTION INTRAVENOUS; SUBCUTANEOUS at 11:03

## 2022-03-29 RX ADMIN — FLUTICASONE FUROATE AND VILANTEROL TRIFENATATE 1 PUFF: 100; 25 POWDER RESPIRATORY (INHALATION) at 07:03

## 2022-03-29 RX ADMIN — HYDRALAZINE HYDROCHLORIDE 25 MG: 25 TABLET, FILM COATED ORAL at 05:03

## 2022-03-29 RX ADMIN — ACETAMINOPHEN 650 MG: 325 TABLET ORAL at 04:03

## 2022-03-29 RX ADMIN — ONDANSETRON 4 MG: 2 INJECTION INTRAMUSCULAR; INTRAVENOUS at 07:03

## 2022-03-29 RX ADMIN — IPRATROPIUM BROMIDE AND ALBUTEROL SULFATE 3 ML: 2.5; .5 SOLUTION RESPIRATORY (INHALATION) at 01:03

## 2022-03-29 RX ADMIN — INSULIN ASPART 3 UNITS: 100 INJECTION, SOLUTION INTRAVENOUS; SUBCUTANEOUS at 08:03

## 2022-03-29 RX ADMIN — INSULIN ASPART 8 UNITS: 100 INJECTION, SOLUTION INTRAVENOUS; SUBCUTANEOUS at 05:03

## 2022-03-29 RX ADMIN — ATORVASTATIN CALCIUM 20 MG: 10 TABLET, FILM COATED ORAL at 09:03

## 2022-03-29 RX ADMIN — HYDRALAZINE HYDROCHLORIDE 20 MG: 20 INJECTION, SOLUTION INTRAMUSCULAR; INTRAVENOUS at 08:03

## 2022-03-29 RX ADMIN — LISINOPRIL 20 MG: 10 TABLET ORAL at 09:03

## 2022-03-29 RX ADMIN — OXYBUTYNIN CHLORIDE 5 MG: 5 TABLET ORAL at 08:03

## 2022-03-29 RX ADMIN — AMLODIPINE BESYLATE 5 MG: 5 TABLET ORAL at 10:03

## 2022-03-29 RX ADMIN — HEPARIN SODIUM 5000 UNITS: 5000 INJECTION, SOLUTION INTRAVENOUS; SUBCUTANEOUS at 08:03

## 2022-03-29 RX ADMIN — INSULIN DETEMIR 30 UNITS: 100 INJECTION, SOLUTION SUBCUTANEOUS at 08:03

## 2022-03-29 RX ADMIN — GUAIFENESIN 600 MG: 600 TABLET, EXTENDED RELEASE ORAL at 09:03

## 2022-03-29 RX ADMIN — GUAIFENESIN 600 MG: 600 TABLET, EXTENDED RELEASE ORAL at 08:03

## 2022-03-29 RX ADMIN — HEPARIN SODIUM 5000 UNITS: 5000 INJECTION, SOLUTION INTRAVENOUS; SUBCUTANEOUS at 02:03

## 2022-03-29 RX ADMIN — BUSPIRONE HYDROCHLORIDE 15 MG: 5 TABLET ORAL at 09:03

## 2022-03-29 RX ADMIN — GABAPENTIN 600 MG: 300 CAPSULE ORAL at 08:03

## 2022-03-29 RX ADMIN — OXYBUTYNIN CHLORIDE 5 MG: 5 TABLET ORAL at 09:03

## 2022-03-29 RX ADMIN — CARVEDILOL 12.5 MG: 12.5 TABLET, FILM COATED ORAL at 05:03

## 2022-03-29 NOTE — PLAN OF CARE
"   03/29/22 1020   Discharge Reassessment   Assessment Type Discharge Planning Reassessment   Did the patient's condition or plan change since previous assessment? No   Discharge Plan discussed with: Patient   Communicated MAGNOLIA with patient/caregiver Date not available/Unable to determine   Discharge Plan A Home   DME Needed Upon Discharge    (To be determined.  Patient may need  home oxygen at discharge.)   Discharge Barriers Identified None   Why the patient remains in the hospital Requires continued medical care   Post-Acute Status   Discharge Delays None known at this time   SW spoke with patient this morning to complete reassessment for potential discharge needs.  Patient remains in the hospital for treatment of pneumonia.  She tells SW that they are awaiting results of blood cultures to determine whether or not she can discharge to home on PO antibiotics or remain in the hospital for further care.  Patient complains of continued cough; otherwise says that she is feeling "a bit better".  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  Daughter currently staying with her brother and patient reports that she is doing well with brother and patient is not worried about her.      Discharge needs may include home oxygen and home health, dependent on patient's needs once definitive date is known for discharge.    SW will continue to follow.  "

## 2022-03-29 NOTE — PROGRESS NOTES
Pharmacokinetic Assessment Follow Up: IV Vancomycin    Vancomycin serum concentration assessment(s):    The trough level was drawn correctly and can be used to guide therapy at this time. The measurement is below the desired definitive target range of 15 to 20 mcg/mL.    Vancomycin Regimen Plan:    Change regimen to Vancomycin 1250 mg IV every 12 hours with next serum trough concentration measured at 2200 prior to 60 minutes dose on 03/30/2022    Drug levels (last 3 results):  Recent Labs   Lab Result Units 03/28/22  2104   Vancomycin-Trough ug/mL 11.2       Pharmacy will continue to follow and monitor vancomycin.    Please contact pharmacy at extension [phone] for questions regarding this assessment.    Thank you for the consult,   Ben Odonnell       Patient brief summary:  Syl Matos is a 66 y.o. female initiated on antimicrobial therapy with IV Vancomycin for treatment of lower respiratory infection    The patient's current regimen is Vancomycin 1000mg q12h    Drug Allergies:   Review of patient's allergies indicates:   Allergen Reactions    Iodine and iodide containing products Hives       Actual Body Weight:   90.7 Kg    Renal Function:   Estimated Creatinine Clearance: 78 mL/min (based on SCr of 0.8 mg/dL).,     Dialysis Method (if applicable):  N/A    CBC (last 72 hours):  Recent Labs   Lab Result Units 03/27/22  0619 03/28/22  0606   WBC K/uL 15.26* 13.51*   Hemoglobin g/dL 10.1* 10.0*   Hematocrit % 31.4* 30.7*   Platelets K/uL 206 205   Gran % % 73.2* 55.0   Lymph % % 17.5* 19.0   Mono % % 6.9 4.0   Eosinophil % % 0.6 2.0   Basophil % % 0.2 0.0   Differential Method  Automated Manual       Metabolic Panel (last 72 hours):  Recent Labs   Lab Result Units 03/27/22  0619 03/28/22  0606 03/29/22  0608   Sodium mmol/L 141 142  --    Potassium mmol/L 3.7 4.0  --    Chloride mmol/L 104 104  --    CO2 mmol/L 28 27  --    Glucose mg/dL 145* 135*  --    BUN mg/dL 11 10 12   Creatinine mg/dL 0.8 0.8 0.8    Magnesium mg/dL 2.0 2.0  --        Vancomycin Administrations:  vancomycin given in the last 96 hours                     vancomycin in dextrose 5 % 1 gram/250 mL IVPB 1,000 mg (mg) 1,000 mg New Bag 03/28/22 2201     1,000 mg New Bag  0937     1,000 mg New Bag 03/27/22 2129     1,000 mg New Bag  1038                    Microbiologic Results:  Microbiology Results (last 7 days)       Procedure Component Value Units Date/Time    Blood culture x two cultures. Draw prior to antibiotics. [[id number]] Collected: 03/25/22 1125    Order Status: Completed Specimen: Blood from Peripheral, Forearm, Right Updated: 03/28/22 2012     Blood Culture, Routine No Growth to date      No Growth to date      No Growth to date      No Growth to date    Narrative:      Aerobic and anaerobic    Blood culture x two cultures. Draw prior to antibiotics. [[id number]] Collected: 03/25/22 1134    Order Status: Completed Specimen: Blood from Peripheral, Hand, Right Updated: 03/28/22 2012     Blood Culture, Routine No Growth to date      No Growth to date      No Growth to date      No Growth to date    Narrative:      Aerobic and anaerobic    Culture, Respiratory with Gram Stain [[id number]]  (Abnormal) Collected: 03/25/22 2120    Order Status: Completed Specimen: Respiratory from Sputum, Expectorated Updated: 03/28/22 1115     Respiratory Culture No Pseudomonas isolated.      STAPHYLOCOCCUS AUREUS  Rare  Susceptibility pending  Normal respiratory ondina also present       Gram Stain (Respiratory) <10 epithelial cells per low power field.     Gram Stain (Respiratory) Many WBC's     Gram Stain (Respiratory) Few Gram positive cocci    Respiratory Infection Panel (PCR), Nasopharyngeal [[id number]]  (Abnormal) Collected: 03/27/22 0831    Order Status: Completed Specimen: Nasopharyngeal Swab Updated: 03/27/22 1805     Respiratory Infection Panel Source NP swab     Adenovirus Not Detected     Coronavirus 229E, Common Cold Virus Not Detected      Coronavirus HKU1, Common Cold Virus Not Detected     Coronavirus NL63, Common Cold Virus Not Detected     Coronavirus OC43, Common Cold Virus Not Detected     Comment: The Coronavirus strains detected in this test cause the common cold.  These strains are not the COVID-19 (novel Coronavirus)strain   associated with the respiratory disease outbreak.          SARS-CoV2 (COVID-19) Qualitative PCR Not Detected     Human Metapneumovirus Not Detected     Human Rhinovirus/Enterovirus Detected     Influenza A (subtypes H1, H1-2009,H3) Not Detected     Influenza B Not Detected     Parainfluenza Virus 1 Not Detected     Parainfluenza Virus 2 Not Detected     Parainfluenza Virus 3 Not Detected     Parainfluenza Virus 4 Not Detected     Respiratory Syncytial Virus Not Detected     Bordetella Parapertussis (OI1714) Not Detected     Bordetella pertussis (ptxP) Not Detected     Chlamydia pneumoniae Not Detected     Mycoplasma pneumoniae Not Detected    Narrative:      For all other respiratory sources, order [id number] -  Respiratory Viral Panel by PCR  Respiratory Infection Panel source->NP Swab    Influenza A & B by Molecular [[id number]] Collected: 03/25/22 1124    Order Status: Completed Specimen: Nasopharyngeal Swab Updated: 03/25/22 1158     Influenza A, Molecular Negative     Influenza B, Molecular Negative     Flu A & B Source Nasal Swab

## 2022-03-29 NOTE — CARE UPDATE
03/29/22 1315   Home Oxygen Qualification   $ Home O2 Qualification Tech time 15 minutes   Room Air SpO2 At Rest 92 %   Room Air SpO2 During Ambulation (!) 87 %   SpO2 During Ambulation on O2 96 %   Heart Rate on O2 73 bpm   Ambulation O2 LPM 1 LPM   SpO2 Post Ambulation 99 %   Post Ambulation Heart Rate 60 bpm   Post Ambulation O2 LPM 1 LPM   Home Oxygen evaluation

## 2022-03-29 NOTE — ASSESSMENT & PLAN NOTE
Viral pneumonia - rhinovirus with superimposed bacterial pneumonia - staph aureus  Febrile on admission to 101.4. lactic normal. Remains febrile for >48 hours. Viral respiratory panel ordered. Sputum cultures with gram positive cocci, added MRSA coverage with improvement in symptoms  F/u sputum cultures   duonebs scheduled  Continue IV steroids  Continue IV levaquin, Vanc  Daily labs - CBC, CMP  Supplemental oxygen to maintain sats >94%  Blood culture report pending

## 2022-03-29 NOTE — PT/OT/SLP PROGRESS
Occupational Therapy   Treatment    Name: Syl Matos  MRN: [id number]  Admitting Diagnosis:  Pneumonia of right lower lobe due to infectious organism       Recommendations:     Discharge Recommendations:    Discharge Equipment Recommendations:     Barriers to discharge:       Assessment:     Syl Matos is a 66 y.o. female with a medical diagnosis of Pneumonia of right lower lobe due to infectious organism.  She presents with decreased Ind w/ all adls. Performance deficits affecting function are  weakness, decreased functional activity tolerance.       Rehab Prognosis:  Good; patient would benefit from acute skilled OT services to address these deficits and reach maximum level of function.       Plan:     Patient to be seen   3-5x's per weekto address the above listed problems via  ot intervention  · Plan of Care Expires:  upon dc   · Plan of Care Reviewed with:  patient/family    Subjective     Pain/Comfort:  ·  patient w/ no c/o pain      Objective:     Communicated with: nursing prior to session.  Patient found supine with   upon OT entry to room.    General Precautions: Standard,     Orthopedic Precautions:    Braces:    Respiratory Status: Nasal cannula, flow 2 L/min     Occupational Performance:     Bed Mobility:    · Patient completed Rolling/Turning to Left with  independence  · Patient completed Rolling/Turning to Right with independence  · Patient completed Scooting/Bridging with independence  · Patient completed Supine to Sit with independence  · Patient completed Sit to Supine with independence     Functional Mobility/Transfers:  · Patient completed Sit <> Stand Transfer with modified independence  with  hand-held assist   · Patient completed Bed <> Chair Transfer using Stand Pivot technique with modified independence with hand-held assist  · Functional Mobility: patient ambulated from bed to chair w/ sba           Treatment & Education:  Patient performed BUE strength w/ use of 2# weight on  dowel 3 set 15 reps all planes of ue movement.  Patient performed static/dynamic stand balance activities w/ use of functional reach tasks for approx 10 min cont stand. Patient performed chair pushups 3 sets 5 reps.  Patient performed functional activity tolerance activity while in stand demonstrating ability to maintain constant movement of b ue's during stand task for 2 ,min x 2 trials.        Patient left up in chair with all lines intactEducation:      GOALS: GOALS:   1. PATIENT WILL PERFORM  LB DRESSING W/ SUA  2. PATIENT WILL INCREASE FUNCTIONAL ACTIVITY TOLERANCE TO GOOD IN ORDER TO FACILITATE OPTIMAL ADL PERFORMANCE.   3. PATIENT WILL  PERFORM  TOILETING TASKS W/ MOD IND.       Time Tracking:     OT Date of Treatment:  03/29/2022  OT Start Time:  8:22  OT Stop Time:  8:52  OT Total Time (min):  30    Billable Minutes:Therapeutic Activity 15  Therapeutic Exercise 15               3/29/2022

## 2022-03-29 NOTE — PT/OT/SLP PROGRESS
Physical Therapy Treatment    Patient Name:  Syl Matos   MRN:  [id number]    Recommendations:     Discharge Recommendations:   (home with family support)   Discharge Equipment Recommendations:  none   Barriers to discharge: None    Assessment:     Syl Matos is a 66 y.o. female admitted with a medical diagnosis of Pneumonia of right lower lobe due to infectious organism.  She presents with the following impairments/functional limitations:  impaired endurance, weakness. Patient tolerated treatment well today however requiring nasal cannula at 1 lpm to maintain O2 sats > 90%. Decreased coughing noted during activity today. Activity tolerance and dynamic balance improving.    Rehab Prognosis: Good; patient would benefit from acute skilled PT services to address these deficits and reach maximum level of function.    Recent Surgery: * No surgery found *      Plan:     During this hospitalization, patient to be seen  (3-5 x/wk) to address the identified rehab impairments via gait training, therapeutic activities, therapeutic exercises and progress toward the following goals:    · Plan of Care Expires:   (upon facility discharge)    Subjective     Chief Complaint: weakness  Patient/Family Comments/goals: patient stating she has been sitting up in a chair all day, tired of being in the bed  Pain/Comfort:  · Pain Rating 1: 0/10      Objective:     Communicated with RN prior to session.  Patient found up in chair with peripheral IV, pulse ox (continuous), telemetry upon PT entry to room.     General Precautions: Standard,     Orthopedic Precautions:N/A   Braces:  N/A  Respiratory Status: Nasal cannula, flow 1 L/min     Functional Mobility:  · Bed Mobility: did not occur as patient sitting up in chair upon PT arrival and departure  · Transfers:  Sit to Stand:  contact guard assistance with no AD  · Gait: patient ambulated 150' with CGA to SBA with no AD, RT present monitoring O2 sats on room air and nasal  cannula       Therapeutic Activities and Exercises:  Patient performed BLE exercise in sitting 2 x 10 reps for heel-toe raises, alt knee ext, and marching.    Patient Education Provided:              -PT roles, expectations, and POC               -Safety with mobility              -Benefits of OOB activities to increase strength and functional mobility               -Performing ther ex for increasing LE ROM and strength    Patient left up in chair with all lines intact, call button in reach, RN notified and RT present to administer breathing treatment.    GOALS:   1. Patient to be mod I with all aspects of bed mobility  2. Patient to perform transfers with supervision  3. Patient to ambulate 150' with SBA using LRAD    Time Tracking:     PT Received On: 03/29/22  PT Start Time: 1310     PT Stop Time: 1329  PT Total Time (min): 19 min     Billable Minutes: Gait Training 19 min    Treatment Type: Treatment  PT/PTA: PT           03/29/2022

## 2022-03-29 NOTE — ASSESSMENT & PLAN NOTE
Chronic, uncontrolled.  Latest blood pressure and vitals reviewed-   Temp:  [96.9 °F (36.1 °C)-98.7 °F (37.1 °C)]   Pulse:  [60-75]   Resp:  [15-20]   BP: (147-189)/(63-74)   SpO2:  [92 %-100 %] .   Home meds for hypertension were reviewed and noted below.   Hypertension Medications             amLODIPine (NORVASC) 10 MG tablet Take 1 tablet (10 mg total) by mouth once daily.    carvedilol (COREG) 12.5 MG tablet TK 1 T PO BID    furosemide (LASIX) 20 MG tablet Take 20 mg by mouth 2 (two) times daily.    lisinopril (PRINIVIL,ZESTRIL) 20 MG tablet Take 20 mg by mouth once daily.    metoprolol succinate (TOPROL-XL) 50 MG 24 hr tablet Take 50 mg by mouth once daily.    propranolol (INDERAL LA) 160 mg 24 hr capsule     triamterene-hydrochlorothiazide 37.5-25 mg (DYAZIDE) 37.5-25 mg per capsule Take 1 capsule by mouth every morning.      Will add Norvasc    While in the hospital, will manage blood pressure as follows; resume home blood pressure medications. Labile blood pressure, will continue current medications    Will utilize p.r.n. blood pressure medication only if patient's blood pressure greater than  180/110 and she develops symptoms such as worsening chest pain or shortness of breath.

## 2022-03-29 NOTE — SUBJECTIVE & OBJECTIVE
Interval History:  She is having a nonproductive cough.  Denies shortness of breath, fever or chills.    Review of Systems   Constitutional: Negative.    HENT: Negative.     Eyes: Negative.    Respiratory:  Positive for cough.    Cardiovascular: Negative.    Gastrointestinal: Negative.    Endocrine: Negative.    Genitourinary: Negative.    Musculoskeletal: Negative.    Skin: Negative.    Allergic/Immunologic: Negative.    Neurological: Negative.    Hematological: Negative.    Psychiatric/Behavioral: Negative.     Objective:     Vital Signs (Most Recent):  Temp: 98.4 °F (36.9 °C) (03/29/22 0734)  Pulse: 64 (03/29/22 0734)  Resp: 18 (03/29/22 0734)  BP: (!) 180/72 (03/29/22 0734)  SpO2: (!) 94 % (03/29/22 0734)   Vital Signs (24h Range):  Temp:  [96.9 °F (36.1 °C)-98.7 °F (37.1 °C)] 98.4 °F (36.9 °C)  Pulse:  [60-75] 64  Resp:  [15-20] 18  SpO2:  [92 %-100 %] 94 %  BP: (147-189)/(63-74) 180/72     Weight: 90.7 kg (200 lb)  Body mass index is 33.28 kg/m².    Intake/Output Summary (Last 24 hours) at 3/29/2022 0918  Last data filed at 3/29/2022 0405  Gross per 24 hour   Intake 2115.83 ml   Output --   Net 2115.83 ml      Physical Exam  Constitutional:       Appearance: She is obese.   HENT:      Right Ear: External ear normal.      Left Ear: External ear normal.      Nose: Nose normal.   Eyes:      Extraocular Movements: Extraocular movements intact.   Cardiovascular:      Rate and Rhythm: Normal rate and regular rhythm.      Pulses: Normal pulses.   Pulmonary:      Breath sounds: Rhonchi present. No wheezing or rales.   Abdominal:      General: Bowel sounds are normal.      Palpations: Abdomen is soft.      Comments: Obese   Musculoskeletal:         General: Normal range of motion.      Cervical back: Normal range of motion and neck supple.   Skin:     General: Skin is warm.      Capillary Refill: Capillary refill takes 2 to 3 seconds.   Neurological:      General: No focal deficit present.      Mental Status: She is  alert and oriented to person, place, and time.   Psychiatric:         Mood and Affect: Mood normal.         Behavior: Behavior normal.         Thought Content: Thought content normal.       Significant Labs: All pertinent labs within the past 24 hours have been reviewed.    Significant Imaging: I have reviewed all pertinent imaging results/findings within the past 24 hours.

## 2022-03-29 NOTE — PLAN OF CARE
Patient in no apparent distress. Sat's  99 % on 2 lpm, decreased to 1 lpm. PRN treatments given. IS done. Aerobika instruct done. Walk for home O2 evaluation done. Patient had no shortness of breath during walk . Will continue to monitor.

## 2022-03-29 NOTE — PLAN OF CARE
03/29/22 1020   Medicare Message   Important Message from Medicare regarding Discharge Appeal Rights Explained to patient/caregiver;Signed/date by patient/caregiver   Date IMM was signed 03/29/22   Time IMM was signed 1020

## 2022-03-29 NOTE — PLAN OF CARE
Problem: Adult Inpatient Plan of Care  Goal: Plan of Care Review  Outcome: Ongoing, Progressing  Goal: Patient-Specific Goal (Individualized)  Outcome: Ongoing, Progressing  Goal: Absence of Hospital-Acquired Illness or Injury  Outcome: Ongoing, Progressing  Goal: Optimal Comfort and Wellbeing  Outcome: Ongoing, Progressing  Goal: Readiness for Transition of Care  Outcome: Ongoing, Progressing     Problem: Diabetes Comorbidity  Goal: Blood Glucose Level Within Targeted Range  Outcome: Ongoing, Progressing     Problem: Skin Injury Risk Increased  Goal: Skin Health and Integrity  Outcome: Ongoing, Progressing     Problem: Fluid Imbalance (Pneumonia)  Goal: Fluid Balance  Outcome: Ongoing, Progressing     Problem: Infection (Pneumonia)  Goal: Resolution of Infection Signs and Symptoms  Outcome: Ongoing, Progressing     Problem: Respiratory Compromise (Pneumonia)  Goal: Effective Oxygenation and Ventilation  Outcome: Ongoing, Progressing     Problem: Fall Injury Risk  Goal: Absence of Fall and Fall-Related Injury  Outcome: Ongoing, Progressing

## 2022-03-29 NOTE — ASSESSMENT & PLAN NOTE
Patient's FSGs are uncontrolled due to hyperglycemia on current medication regimen. Improving now  Last A1c reviewed-   Lab Results   Component Value Date    HGBA1C 8.3 (H) 01/14/2021     Most recent fingerstick glucose reviewed-   Recent Labs   Lab 03/28/22  1244 03/28/22  1628 03/28/22  2042 03/29/22  0738   POCTGLUCOSE 260* 234* 198* 192*     Current correctional scale  Medium  Decrease anti-hyperglycemic dose as follows- decrease basal insulin by 50% initially  Antihyperglycemics (From admission, onward)            Start     Stop Route Frequency Ordered    03/25/22 2100  insulin detemir U-100 pen 30 Units         -- SubQ Nightly 03/25/22 1410    03/25/22 1414  insulin aspart U-100 pen 1-10 Units         -- SubQ Before meals & nightly PRN 03/25/22 1410        Hold Oral hypoglycemics while patient is in the hospital.

## 2022-03-29 NOTE — PROGRESS NOTES
[facility] Medicine  Progress Note    Patient Name: Syl Matos  MRN: [id number]  Patient Class: IP- Inpatient   Admission Date: 3/25/2022  Length of Stay: 3 days  Attending Physician: Lexy Kimbrough MD  Primary Care Provider: Luis Diaz MD        Subjective:     Principal Problem:Pneumonia of right lower lobe due to infectious organism        HPI:  Patient is a 66-year-old female with past medical history of diabetes, hyperlipidemia, hypertension who presents to the ER with complaints of fever, cough, shortness of breath over the last several days that worsened last night.  Her sister has also been sick with similar symptoms.  Symptoms started with sneezing, sore throat and seemed to progress with shortness of breath and a deep productive cough.  She came to the ER today because of fever and worsening lethargy, cough.  In the ER, she was febrile to 101.4, tachypneic and hypoxic requiring supplemental oxygen.  Chest x-ray showed right lower lobe pneumonia.  Hospital team called for admission.      Overview/Hospital Course:  No notes on file    Interval History:  She is having a nonproductive cough.  Denies shortness of breath, fever or chills.    Review of Systems   Constitutional: Negative.    HENT: Negative.     Eyes: Negative.    Respiratory:  Positive for cough.    Cardiovascular: Negative.    Gastrointestinal: Negative.    Endocrine: Negative.    Genitourinary: Negative.    Musculoskeletal: Negative.    Skin: Negative.    Allergic/Immunologic: Negative.    Neurological: Negative.    Hematological: Negative.    Psychiatric/Behavioral: Negative.     Objective:     Vital Signs (Most Recent):  Temp: 98.4 °F (36.9 °C) (03/29/22 0734)  Pulse: 64 (03/29/22 0734)  Resp: 18 (03/29/22 0734)  BP: (!) 180/72 (03/29/22 0734)  SpO2: (!) 94 % (03/29/22 0734)   Vital Signs (24h Range):  Temp:  [96.9 °F (36.1 °C)-98.7 °F (37.1 °C)] 98.4 °F (36.9 °C)  Pulse:  [60-75] 64  Resp:  [15-20] 18  SpO2:   [92 %-100 %] 94 %  BP: (147-189)/(63-74) 180/72     Weight: 90.7 kg (200 lb)  Body mass index is 33.28 kg/m².    Intake/Output Summary (Last 24 hours) at 3/29/2022 0918  Last data filed at 3/29/2022 0405  Gross per 24 hour   Intake 2115.83 ml   Output --   Net 2115.83 ml      Physical Exam  Constitutional:       Appearance: She is obese.   HENT:      Right Ear: External ear normal.      Left Ear: External ear normal.      Nose: Nose normal.   Eyes:      Extraocular Movements: Extraocular movements intact.   Cardiovascular:      Rate and Rhythm: Normal rate and regular rhythm.      Pulses: Normal pulses.   Pulmonary:      Breath sounds: Rhonchi present. No wheezing or rales.   Abdominal:      General: Bowel sounds are normal.      Palpations: Abdomen is soft.      Comments: Obese   Musculoskeletal:         General: Normal range of motion.      Cervical back: Normal range of motion and neck supple.   Skin:     General: Skin is warm.      Capillary Refill: Capillary refill takes 2 to 3 seconds.   Neurological:      General: No focal deficit present.      Mental Status: She is alert and oriented to person, place, and time.   Psychiatric:         Mood and Affect: Mood normal.         Behavior: Behavior normal.         Thought Content: Thought content normal.       Significant Labs: All pertinent labs within the past 24 hours have been reviewed.    Significant Imaging: I have reviewed all pertinent imaging results/findings within the past 24 hours.      Assessment/Plan:      * Pneumonia of right lower lobe due to infectious organism  Viral pneumonia - rhinovirus with superimposed bacterial pneumonia - staph aureus  Febrile on admission to 101.4. lactic normal. Remains febrile for >48 hours. Viral respiratory panel ordered. Sputum cultures with gram positive cocci, added MRSA coverage with improvement in symptoms  F/u sputum cultures   duonebs scheduled  Continue IV steroids  Continue IV levaquin, Vanc  Daily labs - CBC,  CMP  Supplemental oxygen to maintain sats >94%  Blood culture report pending      Mixed diabetic hyperlipidemia associated with type 2 diabetes mellitus  See above, continue statin therapy      Essential hypertension  Chronic, uncontrolled.  Latest blood pressure and vitals reviewed-   Temp:  [96.9 °F (36.1 °C)-98.7 °F (37.1 °C)]   Pulse:  [60-75]   Resp:  [15-20]   BP: (147-189)/(63-74)   SpO2:  [92 %-100 %] .   Home meds for hypertension were reviewed and noted below.   Hypertension Medications             amLODIPine (NORVASC) 10 MG tablet Take 1 tablet (10 mg total) by mouth once daily.    carvedilol (COREG) 12.5 MG tablet TK 1 T PO BID    furosemide (LASIX) 20 MG tablet Take 20 mg by mouth 2 (two) times daily.    lisinopril (PRINIVIL,ZESTRIL) 20 MG tablet Take 20 mg by mouth once daily.    metoprolol succinate (TOPROL-XL) 50 MG 24 hr tablet Take 50 mg by mouth once daily.    propranolol (INDERAL LA) 160 mg 24 hr capsule     triamterene-hydrochlorothiazide 37.5-25 mg (DYAZIDE) 37.5-25 mg per capsule Take 1 capsule by mouth every morning.      Will add Norvasc    While in the hospital, will manage blood pressure as follows; resume home blood pressure medications. Labile blood pressure, will continue current medications    Will utilize p.r.n. blood pressure medication only if patient's blood pressure greater than  180/110 and she develops symptoms such as worsening chest pain or shortness of breath.      Type 2 diabetes mellitus with hyperglycemia, with long-term current use of insulin  Patient's FSGs are uncontrolled due to hyperglycemia on current medication regimen. Improving now  Last A1c reviewed-   Lab Results   Component Value Date    HGBA1C 8.3 (H) 01/14/2021     Most recent fingerstick glucose reviewed-   Recent Labs   Lab 03/28/22  1244 03/28/22  1628 03/28/22  2042 03/29/22  0738   POCTGLUCOSE 260* 234* 198* 192*     Current correctional scale  Medium  Decrease anti-hyperglycemic dose as follows-  decrease basal insulin by 50% initially  Antihyperglycemics (From admission, onward)            Start     Stop Route Frequency Ordered    03/25/22 2100  insulin detemir U-100 pen 30 Units         -- SubQ Nightly 03/25/22 1410    03/25/22 1414  insulin aspart U-100 pen 1-10 Units         -- SubQ Before meals & nightly PRN 03/25/22 1410        Hold Oral hypoglycemics while patient is in the hospital.          VTE Risk Mitigation (From admission, onward)         Ordered     heparin (porcine) injection 5,000 Units  Every 8 hours         03/25/22 1410     IP VTE HIGH RISK PATIENT  Once         03/25/22 1410     Place sequential compression device  Until discontinued         03/25/22 1410                Discharge Planning   MAGNOLIA:      Code Status: Full Code   Is the patient medically ready for discharge?:     Reason for patient still in hospital (select all that apply): Treatment  Discharge Plan A: Home                  Gibson Almendarez MD  Department of Hospital Medicine   [facility]

## 2022-03-30 PROBLEM — I48.0 PAF (PAROXYSMAL ATRIAL FIBRILLATION): Status: ACTIVE | Noted: 2022-03-30

## 2022-03-30 LAB
BACTERIA BLD CULT: NORMAL
BACTERIA BLD CULT: NORMAL
BUN SERPL-MCNC: 9 MG/DL (ref 8–23)
CREAT SERPL-MCNC: 0.8 MG/DL (ref 0.5–1.4)
EST. GFR  (AFRICAN AMERICAN): >60 ML/MIN/1.73 M^2
EST. GFR  (NON AFRICAN AMERICAN): >60 ML/MIN/1.73 M^2
POCT GLUCOSE: 202 MG/DL (ref 70–110)
POCT GLUCOSE: 271 MG/DL (ref 70–110)
POCT GLUCOSE: 272 MG/DL (ref 70–110)
POCT GLUCOSE: 315 MG/DL (ref 70–110)
TSH SERPL DL<=0.005 MIU/L-ACNC: 0.82 UIU/ML (ref 0.4–4)

## 2022-03-30 PROCEDURE — 63600175 PHARM REV CODE 636 W HCPCS: Performed by: FAMILY MEDICINE

## 2022-03-30 PROCEDURE — 97530 THERAPEUTIC ACTIVITIES: CPT

## 2022-03-30 PROCEDURE — 94640 AIRWAY INHALATION TREATMENT: CPT

## 2022-03-30 PROCEDURE — 99232 PR SUBSEQUENT HOSPITAL CARE,LEVL II: ICD-10-PCS | Mod: ,,, | Performed by: HOSPITALIST

## 2022-03-30 PROCEDURE — 84443 ASSAY THYROID STIM HORMONE: CPT | Performed by: HOSPITALIST

## 2022-03-30 PROCEDURE — 94799 UNLISTED PULMONARY SVC/PX: CPT

## 2022-03-30 PROCEDURE — 36415 COLL VENOUS BLD VENIPUNCTURE: CPT | Performed by: HOSPITALIST

## 2022-03-30 PROCEDURE — 25000003 PHARM REV CODE 250: Performed by: HOSPITALIST

## 2022-03-30 PROCEDURE — 25000242 PHARM REV CODE 250 ALT 637 W/ HCPCS: Performed by: FAMILY MEDICINE

## 2022-03-30 PROCEDURE — 99900035 HC TECH TIME PER 15 MIN (STAT)

## 2022-03-30 PROCEDURE — 21400001 HC TELEMETRY ROOM

## 2022-03-30 PROCEDURE — 82565 ASSAY OF CREATININE: CPT | Performed by: FAMILY MEDICINE

## 2022-03-30 PROCEDURE — 25000003 PHARM REV CODE 250: Performed by: INTERNAL MEDICINE

## 2022-03-30 PROCEDURE — 97110 THERAPEUTIC EXERCISES: CPT

## 2022-03-30 PROCEDURE — 27000221 HC OXYGEN, UP TO 24 HOURS

## 2022-03-30 PROCEDURE — 93005 ELECTROCARDIOGRAM TRACING: CPT

## 2022-03-30 PROCEDURE — 84520 ASSAY OF UREA NITROGEN: CPT | Performed by: FAMILY MEDICINE

## 2022-03-30 PROCEDURE — 94664 DEMO&/EVAL PT USE INHALER: CPT

## 2022-03-30 PROCEDURE — 94761 N-INVAS EAR/PLS OXIMETRY MLT: CPT

## 2022-03-30 PROCEDURE — 99232 SBSQ HOSP IP/OBS MODERATE 35: CPT | Mod: ,,, | Performed by: HOSPITALIST

## 2022-03-30 PROCEDURE — 63600175 PHARM REV CODE 636 W HCPCS: Performed by: INTERNAL MEDICINE

## 2022-03-30 PROCEDURE — 36415 COLL VENOUS BLD VENIPUNCTURE: CPT | Performed by: FAMILY MEDICINE

## 2022-03-30 PROCEDURE — 27000207 HC ISOLATION

## 2022-03-30 PROCEDURE — 25000003 PHARM REV CODE 250: Performed by: FAMILY MEDICINE

## 2022-03-30 RX ORDER — DILTIAZEM HYDROCHLORIDE 5 MG/ML
10 INJECTION INTRAVENOUS ONCE
Status: COMPLETED | OUTPATIENT
Start: 2022-03-30 | End: 2022-03-30

## 2022-03-30 RX ORDER — PROPAFENONE HYDROCHLORIDE 150 MG/1
150 TABLET, COATED ORAL 2 TIMES DAILY
Status: DISCONTINUED | OUTPATIENT
Start: 2022-03-30 | End: 2022-03-31 | Stop reason: HOSPADM

## 2022-03-30 RX ORDER — PANTOPRAZOLE SODIUM 40 MG/1
40 TABLET, DELAYED RELEASE ORAL DAILY
Status: DISCONTINUED | OUTPATIENT
Start: 2022-03-30 | End: 2022-03-31 | Stop reason: HOSPADM

## 2022-03-30 RX ORDER — MUPIROCIN 20 MG/G
OINTMENT TOPICAL 2 TIMES DAILY
Status: DISCONTINUED | OUTPATIENT
Start: 2022-03-30 | End: 2022-03-31 | Stop reason: HOSPADM

## 2022-03-30 RX ORDER — ENOXAPARIN SODIUM 100 MG/ML
1 INJECTION SUBCUTANEOUS
Status: DISCONTINUED | OUTPATIENT
Start: 2022-03-30 | End: 2022-03-31 | Stop reason: HOSPADM

## 2022-03-30 RX ORDER — CARVEDILOL 12.5 MG/1
25 TABLET ORAL 2 TIMES DAILY WITH MEALS
Status: DISCONTINUED | OUTPATIENT
Start: 2022-03-30 | End: 2022-03-31 | Stop reason: HOSPADM

## 2022-03-30 RX ORDER — PANTOPRAZOLE SODIUM 40 MG/10ML
40 INJECTION, POWDER, LYOPHILIZED, FOR SOLUTION INTRAVENOUS DAILY
Status: DISCONTINUED | OUTPATIENT
Start: 2022-03-30 | End: 2022-03-30

## 2022-03-30 RX ADMIN — BUSPIRONE HYDROCHLORIDE 15 MG: 5 TABLET ORAL at 08:03

## 2022-03-30 RX ADMIN — HEPARIN SODIUM 5000 UNITS: 5000 INJECTION, SOLUTION INTRAVENOUS; SUBCUTANEOUS at 01:03

## 2022-03-30 RX ADMIN — FLUTICASONE FUROATE AND VILANTEROL TRIFENATATE 1 PUFF: 100; 25 POWDER RESPIRATORY (INHALATION) at 07:03

## 2022-03-30 RX ADMIN — OXYBUTYNIN CHLORIDE 5 MG: 5 TABLET ORAL at 09:03

## 2022-03-30 RX ADMIN — PROPAFENONE HYDROCHLORIDE 150 MG: 150 TABLET, FILM COATED ORAL at 09:03

## 2022-03-30 RX ADMIN — LEVOFLOXACIN 750 MG: 750 INJECTION, SOLUTION INTRAVENOUS at 01:03

## 2022-03-30 RX ADMIN — ENOXAPARIN SODIUM 90 MG: 100 INJECTION SUBCUTANEOUS at 03:03

## 2022-03-30 RX ADMIN — GABAPENTIN 600 MG: 300 CAPSULE ORAL at 08:03

## 2022-03-30 RX ADMIN — IPRATROPIUM BROMIDE AND ALBUTEROL SULFATE 3 ML: 2.5; .5 SOLUTION RESPIRATORY (INHALATION) at 12:03

## 2022-03-30 RX ADMIN — PROPAFENONE HYDROCHLORIDE 150 MG: 150 TABLET, FILM COATED ORAL at 03:03

## 2022-03-30 RX ADMIN — ASPIRIN 81 MG: 81 TABLET, CHEWABLE ORAL at 08:03

## 2022-03-30 RX ADMIN — LISINOPRIL 20 MG: 10 TABLET ORAL at 08:03

## 2022-03-30 RX ADMIN — IPRATROPIUM BROMIDE AND ALBUTEROL SULFATE 3 ML: 2.5; .5 SOLUTION RESPIRATORY (INHALATION) at 07:03

## 2022-03-30 RX ADMIN — GUAIFENESIN 600 MG: 600 TABLET, EXTENDED RELEASE ORAL at 09:03

## 2022-03-30 RX ADMIN — INSULIN ASPART 3 UNITS: 100 INJECTION, SOLUTION INTRAVENOUS; SUBCUTANEOUS at 09:03

## 2022-03-30 RX ADMIN — HYDROCODONE BITARTRATE AND ACETAMINOPHEN 1 TABLET: 5; 325 TABLET ORAL at 07:03

## 2022-03-30 RX ADMIN — DOXYCYCLINE 100 MG: 100 INJECTION, POWDER, LYOPHILIZED, FOR SOLUTION INTRAVENOUS at 06:03

## 2022-03-30 RX ADMIN — GUAIFENESIN 600 MG: 600 TABLET, EXTENDED RELEASE ORAL at 08:03

## 2022-03-30 RX ADMIN — INSULIN ASPART 8 UNITS: 100 INJECTION, SOLUTION INTRAVENOUS; SUBCUTANEOUS at 11:03

## 2022-03-30 RX ADMIN — IPRATROPIUM BROMIDE AND ALBUTEROL SULFATE 3 ML: 2.5; .5 SOLUTION RESPIRATORY (INHALATION) at 06:03

## 2022-03-30 RX ADMIN — AMLODIPINE BESYLATE 5 MG: 5 TABLET ORAL at 08:03

## 2022-03-30 RX ADMIN — GABAPENTIN 600 MG: 300 CAPSULE ORAL at 09:03

## 2022-03-30 RX ADMIN — INSULIN ASPART 6 UNITS: 100 INJECTION, SOLUTION INTRAVENOUS; SUBCUTANEOUS at 04:03

## 2022-03-30 RX ADMIN — PANTOPRAZOLE SODIUM 40 MG: 40 TABLET, DELAYED RELEASE ORAL at 03:03

## 2022-03-30 RX ADMIN — DILTIAZEM HYDROCHLORIDE 10 MG: 5 INJECTION INTRAVENOUS at 04:03

## 2022-03-30 RX ADMIN — ATORVASTATIN CALCIUM 20 MG: 10 TABLET, FILM COATED ORAL at 08:03

## 2022-03-30 RX ADMIN — VANCOMYCIN HYDROCHLORIDE 1250 MG: 1.25 INJECTION, POWDER, LYOPHILIZED, FOR SOLUTION INTRAVENOUS at 10:03

## 2022-03-30 RX ADMIN — CARVEDILOL 25 MG: 12.5 TABLET, FILM COATED ORAL at 08:03

## 2022-03-30 RX ADMIN — OXYBUTYNIN CHLORIDE 5 MG: 5 TABLET ORAL at 08:03

## 2022-03-30 RX ADMIN — CARVEDILOL 25 MG: 12.5 TABLET, FILM COATED ORAL at 04:03

## 2022-03-30 RX ADMIN — MUPIROCIN: 20 OINTMENT TOPICAL at 10:03

## 2022-03-30 RX ADMIN — INSULIN DETEMIR 30 UNITS: 100 INJECTION, SOLUTION SUBCUTANEOUS at 09:03

## 2022-03-30 RX ADMIN — HEPARIN SODIUM 5000 UNITS: 5000 INJECTION, SOLUTION INTRAVENOUS; SUBCUTANEOUS at 05:03

## 2022-03-30 NOTE — SUBJECTIVE & OBJECTIVE
Interval History:  Patient was noted to be in atrial fibrillation with heart rate about 140 beats per minute at 3:30 a.m. this morning.  She return to normal sinus rhythm about 2 hours later.  She denies any palpitations, chest pain shortness of breath  She stated that she has had rapid heartbeat several years ago.    Review of Systems   Constitutional: Negative.    HENT: Negative.     Eyes: Negative.    Respiratory:  Positive for shortness of breath.    Cardiovascular: Negative.    Gastrointestinal: Negative.    Endocrine: Negative.    Genitourinary: Negative.    Musculoskeletal: Negative.    Skin: Negative.    Allergic/Immunologic: Negative.    Neurological: Negative.    Hematological: Negative.    Psychiatric/Behavioral: Negative.     Objective:     Vital Signs (Most Recent):  Temp: 98.9 °F (37.2 °C) (03/30/22 0720)  Pulse: 71 (03/30/22 0720)  Resp: 18 (03/30/22 0720)  BP: (!) 153/67 (03/30/22 0720)  SpO2: (!) 94 % (03/30/22 0916)   Vital Signs (24h Range):  Temp:  [97.6 °F (36.4 °C)-98.9 °F (37.2 °C)] 98.9 °F (37.2 °C)  Pulse:  [] 71  Resp:  [17-21] 18  SpO2:  [90 %-99 %] 94 %  BP: (116-197)/(53-92) 153/67     Weight: 90.7 kg (200 lb)  Body mass index is 33.28 kg/m².    Intake/Output Summary (Last 24 hours) at 3/30/2022 [facility]  Last data filed at 3/29/2022 1845  Gross per 24 hour   Intake 855.47 ml   Output --   Net 855.47 ml      Physical Exam  Vitals and nursing note reviewed.   Constitutional:       Appearance: She is obese.   HENT:      Head: Normocephalic and atraumatic.      Right Ear: External ear normal.      Left Ear: External ear normal.      Mouth/Throat:      Mouth: Mucous membranes are moist.   Eyes:      Extraocular Movements: Extraocular movements intact.   Cardiovascular:      Rate and Rhythm: Normal rate and regular rhythm.      Pulses: Normal pulses.      Heart sounds: Normal heart sounds.   Pulmonary:      Effort: Pulmonary effort is normal.      Breath sounds: Rhonchi present.    Abdominal:      General: Bowel sounds are normal.      Palpations: Abdomen is soft.      Comments: Obese   Musculoskeletal:         General: Normal range of motion.      Cervical back: Normal range of motion and neck supple.   Skin:     General: Skin is warm.      Capillary Refill: Capillary refill takes 2 to 3 seconds.   Neurological:      General: No focal deficit present.      Mental Status: She is alert and oriented to person, place, and time.   Psychiatric:         Mood and Affect: Mood normal.         Behavior: Behavior normal.       Significant Labs: All pertinent labs within the past 24 hours have been reviewed.    Significant Imaging: I have reviewed all pertinent imaging results/findings within the past 24 hours.

## 2022-03-30 NOTE — PLAN OF CARE
Problem: Adult Inpatient Plan of Care  Goal: Plan of Care Review  Outcome: Ongoing, Progressing  Goal: Optimal Comfort and Wellbeing  Outcome: Ongoing, Progressing  Goal: Readiness for Transition of Care  Outcome: Ongoing, Progressing     Problem: Diabetes Comorbidity  Goal: Blood Glucose Level Within Targeted Range  Outcome: Ongoing, Progressing     Problem: Skin Injury Risk Increased  Goal: Skin Health and Integrity  Outcome: Ongoing, Progressing     Problem: Fluid Imbalance (Pneumonia)  Goal: Fluid Balance  Outcome: Ongoing, Progressing     Problem: Infection (Pneumonia)  Goal: Resolution of Infection Signs and Symptoms  Outcome: Ongoing, Progressing     Problem: Respiratory Compromise (Pneumonia)  Goal: Effective Oxygenation and Ventilation  Outcome: Ongoing, Progressing     Problem: Fall Injury Risk  Goal: Absence of Fall and Fall-Related Injury  Outcome: Ongoing, Progressing     Problem: Infection  Goal: Absence of Infection Signs and Symptoms  Outcome: Ongoing, Progressing

## 2022-03-30 NOTE — PT/OT/SLP PROGRESS
Occupational Therapy   Treatment    Name: Syl Matos  MRN: [id number]  Admitting Diagnosis:  Pneumonia of right lower lobe due to infectious organism       Recommendations:     Discharge Recommendations:  home w/ hh  Discharge Equipment Recommendations:   rw  Barriers to discharge:   none    Assessment:     Syl Matos is a 66 y.o. female with a medical diagnosis of Pneumonia of right lower lobe due to infectious organism.  She presents with decreased Ind w/ all adls. Performance deficits affecting function are  weakness, decreased functional activity tolerance.       Rehab Prognosis:  Good; patient would benefit from acute skilled OT services to address these deficits and reach maximum level of function.       Plan:     Patient to be seen   3-5x's per weekto address the above listed problems via  ot intervention  · Plan of Care Expires:  upon dc   · Plan of Care Reviewed with:  patient/family    Subjective     Pain/Comfort:  ·  patient w/ no c/o pain      Objective:     Communicated with: nursing prior to session.  Patient found supine with   upon OT entry to room.    General Precautions: Standard,     Orthopedic Precautions:    Braces:    Respiratory Status: Nasal cannula, flow 2 L/min     Occupational Performance:     Bed Mobility:    · Patient completed Rolling/Turning to Left with  independence  · Patient completed Rolling/Turning to Right with independence  · Patient completed Scooting/Bridging with independence  · Patient completed Supine to Sit with independence  · Patient completed Sit to Supine with independence     Functional Mobility/Transfers:  · Patient completed Sit <> Stand Transfer with modified independence  with  hand-held assist   · Patient completed Bed <> Chair Transfer using Stand Pivot technique with modified independence with hand-held assist  · Functional Mobility: patient ambulated from bed to chair w/ sba           Treatment & Education:  Patient performed BUE AROM EXERCISES  3  set 15 reps all planes of ue movement.  Patient performed static/dynamic stand balance activities w/ use of functional reach tasks for approx 10 min cont stand. Patient performed sit to stand tasks 2 sets 10 reps from sit on eob.   Patient performed functional activity tolerance activity while in stand demonstrating ability to maintain constant movement of b ue's during stand task for 2 ,min x 3  trials.        Patient left in sit on eob w/ all lines intact      GOALS: GOALS:   1. PATIENT WILL PERFORM  LB DRESSING W/ SUA  2. PATIENT WILL INCREASE FUNCTIONAL ACTIVITY TOLERANCE TO GOOD IN ORDER TO FACILITATE OPTIMAL ADL PERFORMANCE.   3. PATIENT WILL  PERFORM  TOILETING TASKS W/ MOD IND.       Time Tracking:     OT Date of Treatment:  03/30/2022  OT Start Time:  15:20  OT Stop Time:  15:50  OT Total Time (min):  30    Billable Minutes:Therapeutic Activity 15  Therapeutic Exercise 15               3/30/2022

## 2022-03-30 NOTE — NURSING
Noted Afib on CM. Denies chest pain or discomfort. Denies history of Afib. EKG completed to verify Afib. Notified Dr. ALMA Miranda. New order placed.

## 2022-03-30 NOTE — ASSESSMENT & PLAN NOTE
Patient's FSGs are uncontrolled due to hyperglycemia on current medication regimen. Improving now  Last A1c reviewed-   Lab Results   Component Value Date    HGBA1C 8.3 (H) 01/14/2021     Most recent fingerstick glucose reviewed-   Recent Labs   Lab 03/29/22  1112 03/29/22  1721 03/29/22 2029 03/30/22  0719   POCTGLUCOSE 270* 336* 272* 202*     Current correctional scale  Medium  Decrease anti-hyperglycemic dose as follows- decrease basal insulin by 50% initially  Antihyperglycemics (From admission, onward)            Start     Stop Route Frequency Ordered    03/25/22 2100  insulin detemir U-100 pen 30 Units         -- SubQ Nightly 03/25/22 1410    03/25/22 1414  insulin aspart U-100 pen 1-10 Units         -- SubQ Before meals & nightly PRN 03/25/22 1410        Hold Oral hypoglycemics while patient is in the hospital.

## 2022-03-30 NOTE — ASSESSMENT & PLAN NOTE
Viral pneumonia - rhinovirus with superimposed bacterial pneumonia - staph aureus  Febrile on admission to 101.4. lactic normal. Remains febrile for >48 hours. Viral respiratory panel ordered. Sputum cultures with gram positive cocci, added MRSA coverage with improvement in symptoms  F/u sputum cultures   duonebs scheduled  Continue IV steroids  Continue IV levaquin, Vanc  Daily labs - CBC, CMP  Supplemental oxygen to maintain sats >94%  Blood culture report pending .  6 minute walk test on yesterday indicates that patient meets criteria for home oxygen therapy

## 2022-03-30 NOTE — PLAN OF CARE
Problem: Adult Inpatient Plan of Care  Goal: Plan of Care Review  Outcome: Ongoing, Progressing     Problem: Diabetes Comorbidity  Goal: Blood Glucose Level Within Targeted Range  Outcome: Ongoing, Progressing     Problem: Skin Injury Risk Increased  Goal: Skin Health and Integrity  Outcome: Ongoing, Progressing     Problem: Infection (Pneumonia)  Goal: Resolution of Infection Signs and Symptoms  Outcome: Ongoing, Progressing     Problem: Respiratory Compromise (Pneumonia)  Goal: Effective Oxygenation and Ventilation  Outcome: Ongoing, Progressing     Plan of care reviewed with patient; verbalized understanding. Purposeful Rounding completed.  Safety precautions maintained. Up in chair most of shift watching movies. Call light within reach, bed wheels locked, bed in lowest position, side rails up x2, safety maintained.

## 2022-03-30 NOTE — ASSESSMENT & PLAN NOTE
CARDIAC MONITORING  EKG  TSH  Echocardiogram  Cardiology consult  Increase Coreg to 25 mg p.o. b.i.d.  Possible discharge after being seen by Cardiology today

## 2022-03-30 NOTE — PROGRESS NOTES
[facility] Medicine  Progress Note    Patient Name: Syl Matos  MRN: [id number]  Patient Class: IP- Inpatient   Admission Date: 3/25/2022  Length of Stay: 4 days  Attending Physician: Lexy Kimbrough MD  Primary Care Provider: Luis Diaz MD        Subjective:     Principal Problem:Pneumonia of right lower lobe due to infectious organism        HPI:  Patient is a 66-year-old female with past medical history of diabetes, hyperlipidemia, hypertension who presents to the ER with complaints of fever, cough, shortness of breath over the last several days that worsened last night.  Her sister has also been sick with similar symptoms.  Symptoms started with sneezing, sore throat and seemed to progress with shortness of breath and a deep productive cough.  She came to the ER today because of fever and worsening lethargy, cough.  In the ER, she was febrile to 101.4, tachypneic and hypoxic requiring supplemental oxygen.  Chest x-ray showed right lower lobe pneumonia.  Hospital team called for admission.      Overview/Hospital Course:  No notes on file    Interval History:  Patient was noted to be in atrial fibrillation with heart rate about 140 beats per minute at 3:30 a.m. this morning.  She return to normal sinus rhythm about 2 hours later.  She denies any palpitations, chest pain shortness of breath  She stated that she has had rapid heartbeat several years ago.    Review of Systems   Constitutional: Negative.    HENT: Negative.     Eyes: Negative.    Respiratory:  Positive for shortness of breath.    Cardiovascular: Negative.    Gastrointestinal: Negative.    Endocrine: Negative.    Genitourinary: Negative.    Musculoskeletal: Negative.    Skin: Negative.    Allergic/Immunologic: Negative.    Neurological: Negative.    Hematological: Negative.    Psychiatric/Behavioral: Negative.     Objective:     Vital Signs (Most Recent):  Temp: 98.9 °F (37.2 °C) (03/30/22 0720)  Pulse: 71 (03/30/22  0720)  Resp: 18 (03/30/22 0720)  BP: (!) 153/67 (03/30/22 0720)  SpO2: (!) 94 % (03/30/22 0916)   Vital Signs (24h Range):  Temp:  [97.6 °F (36.4 °C)-98.9 °F (37.2 °C)] 98.9 °F (37.2 °C)  Pulse:  [] 71  Resp:  [17-21] 18  SpO2:  [90 %-99 %] 94 %  BP: (116-197)/(53-92) 153/67     Weight: 90.7 kg (200 lb)  Body mass index is 33.28 kg/m².    Intake/Output Summary (Last 24 hours) at 3/30/2022 1016  Last data filed at 3/29/2022 1845  Gross per 24 hour   Intake 855.47 ml   Output --   Net 855.47 ml      Physical Exam  Vitals and nursing note reviewed.   Constitutional:       Appearance: She is obese.   HENT:      Head: Normocephalic and atraumatic.      Right Ear: External ear normal.      Left Ear: External ear normal.      Mouth/Throat:      Mouth: Mucous membranes are moist.   Eyes:      Extraocular Movements: Extraocular movements intact.   Cardiovascular:      Rate and Rhythm: Normal rate and regular rhythm.      Pulses: Normal pulses.      Heart sounds: Normal heart sounds.   Pulmonary:      Effort: Pulmonary effort is normal.      Breath sounds: Rhonchi present.   Abdominal:      General: Bowel sounds are normal.      Palpations: Abdomen is soft.      Comments: Obese   Musculoskeletal:         General: Normal range of motion.      Cervical back: Normal range of motion and neck supple.   Skin:     General: Skin is warm.      Capillary Refill: Capillary refill takes 2 to 3 seconds.   Neurological:      General: No focal deficit present.      Mental Status: She is alert and oriented to person, place, and time.   Psychiatric:         Mood and Affect: Mood normal.         Behavior: Behavior normal.       Significant Labs: All pertinent labs within the past 24 hours have been reviewed.    Significant Imaging: I have reviewed all pertinent imaging results/findings within the past 24 hours.      Assessment/Plan:      * Pneumonia of right lower lobe due to infectious organism  Viral pneumonia - rhinovirus with  superimposed bacterial pneumonia - staph aureus  Febrile on admission to 101.4. lactic normal. Remains febrile for >48 hours. Viral respiratory panel ordered. Sputum cultures with gram positive cocci, added MRSA coverage with improvement in symptoms  F/u sputum cultures   duonebs scheduled  Continue IV steroids  Continue IV levaquin, Vanc  Daily labs - CBC, CMP  Supplemental oxygen to maintain sats >94%  Blood culture report pending .  6 minute walk test on yesterday indicates that patient meets criteria for home oxygen therapy      PAF (paroxysmal atrial fibrillation)  CARDIAC MONITORING  EKG  TSH  Echocardiogram  Cardiology consult  Increase Coreg to 25 mg p.o. b.i.d.  Possible discharge after being seen by Cardiology today      Mixed diabetic hyperlipidemia associated with type 2 diabetes mellitus  See above, continue statin therapy      Essential hypertension  Chronic, uncontrolled.  Latest blood pressure and vitals reviewed-   Temp:  [97.6 °F (36.4 °C)-98.9 °F (37.2 °C)]   Pulse:  []   Resp:  [17-21]   BP: (116-197)/(53-92)   SpO2:  [90 %-99 %] .   Home meds for hypertension were reviewed and noted below.   Hypertension Medications             amLODIPine (NORVASC) 10 MG tablet Take 1 tablet (10 mg total) by mouth once daily.    carvedilol (COREG) 12.5 MG tablet TK 1 T PO BID    furosemide (LASIX) 20 MG tablet Take 20 mg by mouth 2 (two) times daily.    lisinopril (PRINIVIL,ZESTRIL) 20 MG tablet Take 20 mg by mouth once daily.    metoprolol succinate (TOPROL-XL) 50 MG 24 hr tablet Take 50 mg by mouth once daily.    propranolol (INDERAL LA) 160 mg 24 hr capsule     triamterene-hydrochlorothiazide 37.5-25 mg (DYAZIDE) 37.5-25 mg per capsule Take 1 capsule by mouth every morning.      Will add Norvasc    While in the hospital, will manage blood pressure as follows; resume home blood pressure medications. Labile blood pressure, will continue current medications    Will utilize p.r.n. blood pressure medication  only if patient's blood pressure greater than  180/110 and she develops symptoms such as worsening chest pain or shortness of breath.      Type 2 diabetes mellitus with hyperglycemia, with long-term current use of insulin  Patient's FSGs are uncontrolled due to hyperglycemia on current medication regimen. Improving now  Last A1c reviewed-   Lab Results   Component Value Date    HGBA1C 8.3 (H) 01/14/2021     Most recent fingerstick glucose reviewed-   Recent Labs   Lab 03/29/22  1112 03/29/22  1721 03/29/22 2029 03/30/22  0719   POCTGLUCOSE 270* 336* 272* 202*     Current correctional scale  Medium  Decrease anti-hyperglycemic dose as follows- decrease basal insulin by 50% initially  Antihyperglycemics (From admission, onward)            Start     Stop Route Frequency Ordered    03/25/22 2100  insulin detemir U-100 pen 30 Units         -- SubQ Nightly 03/25/22 1410    03/25/22 1414  insulin aspart U-100 pen 1-10 Units         -- SubQ Before meals & nightly PRN 03/25/22 1410        Hold Oral hypoglycemics while patient is in the hospital.          VTE Risk Mitigation (From admission, onward)         Ordered     heparin (porcine) injection 5,000 Units  Every 8 hours         03/25/22 1410     IP VTE HIGH RISK PATIENT  Once         03/25/22 1410     Place sequential compression device  Until discontinued         03/25/22 1410                Discharge Planning   MAGNOLIA:      Code Status: Full Code   Is the patient medically ready for discharge?:     Reason for patient still in hospital (select all that apply): Treatment  Discharge Plan A: Home   Discharge Delays: None known at this time              Gibson Almendarez MD  Department of Hospital Medicine   [facility]

## 2022-03-30 NOTE — ASSESSMENT & PLAN NOTE
Chronic, uncontrolled.  Latest blood pressure and vitals reviewed-   Temp:  [97.6 °F (36.4 °C)-98.9 °F (37.2 °C)]   Pulse:  []   Resp:  [17-21]   BP: (116-197)/(53-92)   SpO2:  [90 %-99 %] .   Home meds for hypertension were reviewed and noted below.   Hypertension Medications             amLODIPine (NORVASC) 10 MG tablet Take 1 tablet (10 mg total) by mouth once daily.    carvedilol (COREG) 12.5 MG tablet TK 1 T PO BID    furosemide (LASIX) 20 MG tablet Take 20 mg by mouth 2 (two) times daily.    lisinopril (PRINIVIL,ZESTRIL) 20 MG tablet Take 20 mg by mouth once daily.    metoprolol succinate (TOPROL-XL) 50 MG 24 hr tablet Take 50 mg by mouth once daily.    propranolol (INDERAL LA) 160 mg 24 hr capsule     triamterene-hydrochlorothiazide 37.5-25 mg (DYAZIDE) 37.5-25 mg per capsule Take 1 capsule by mouth every morning.      Will add Norvasc    While in the hospital, will manage blood pressure as follows; resume home blood pressure medications. Labile blood pressure, will continue current medications    Will utilize p.r.n. blood pressure medication only if patient's blood pressure greater than  180/110 and she develops symptoms such as worsening chest pain or shortness of breath.

## 2022-03-30 NOTE — CONSULTS
CARDIOLOGY CONSULTATION    Patient Name:  Syl Matos    :  1955    Medical Record:  [id number]    DATE OF SERVICE: 3/30/2022    ATTENDING PHYSICIAN: Lexy Kimbrough MD    REASON FOR CONSULT:     HISTORY OF PRESENT ILLNESS  The patient is a 66 y.o. y/o female who is hospitalized for Pneumonia of right lower lobe due to infectious organism.  Temp cough for several days seen ER temp 101.4° hypoxia chest x-ray bilateral pneumonia patient developed atrial fibrillation ventricular response of 116 transiently today spontaneously converted states she had a history of arrhythmia and states she has been taking propranolol metoprolol and Coreg at home patient is on Coreg only during this admission patient states he has a history of anemia hemoglobin of 10.0 but states she has not had GI evaluation in the past patient is Jean-Paul Vasc 2  4 cardiology consult for atrial fibrillation    PAST MEDICAL HISTORY  Past Medical History:   Diagnosis Date    Diabetes mellitus     Hypercholesteremia     Hypertension        PAST SURGICAL HISTORY  Past Surgical History:   Procedure Laterality Date    FRACTURE SURGERY      pelvis surgery    TUBAL LIGATION         SOCIAL HISTORY   reports that she has never smoked. She has never used smokeless tobacco. She reports that she does not drink alcohol and does not use drugs.      FAMILY HISTORY  Family History   Problem Relation Age of Onset    Heart disease Mother     Heart disease Father          ALLERGIES   Review of patient's allergies indicates:   Allergen Reactions    Iodine and iodide containing products Hives       HOME  MEDICATIONS  Prior to Admission medications    Medication Sig Start Date End Date Taking? Authorizing Provider   albuterol (PROVENTIL/VENTOLIN HFA) 90 mcg/actuation inhaler Inhale 1-2 puffs into the lungs every 6 (six) hours as needed for Wheezing or Shortness of Breath. Rescue 10/8/21   Dilshad Bland MD   amLODIPine (NORVASC) 10 MG tablet Take 1  tablet (10 mg total) by mouth once daily. 1/23/20 1/22/21  Jesenia Redmond MD   amoxicillin (AMOXIL) 875 MG tablet Take 1 tablet (875 mg total) by mouth 2 (two) times daily. 11/16/21   Lul Keen NP   aspirin 81 MG Chew Take 81 mg by mouth once daily.    Historical Provider   busPIRone (BUSPAR) 15 MG tablet Take 15 mg by mouth once daily.     Historical Provider   carvedilol (COREG) 12.5 MG tablet TK 1 T PO BID 5/15/19   Historical Provider   cholecalciferol, vitamin D3, (VITAMIN D3) 50 mcg (2,000 unit) Tab Take 1 tablet (2,000 Units total) by mouth once daily. 6/23/20   Jesenia Redmond MD   cyanocobalamin 1,000 mcg/mL injection INJECT 1ML (1000MCG TOTAL) INTO THE MUSCLE EVERY 28 DAYS. 12/28/20   Jesenia Redmond MD   FARXIGA 5 mg Tab tablet TAKE 1 TABLET BY MOUTH ONCE DAILY 7/14/21   Ashley Daugherty NP   fluticasone-salmeterol diskus inhaler 250-50 mcg Inhale 1 puff into the lungs 2 (two) times daily. Controller 10/8/21   Dilshad Bland MD   furosemide (LASIX) 20 MG tablet Take 20 mg by mouth 2 (two) times daily.    Historical Provider   gabapentin (NEURONTIN) 300 MG capsule Take 600 mg by mouth 2 (two) times daily.    Historical Provider   HYDROcodone-acetaminophen (NORCO)  mg per tablet  9/16/14   Historical Provider   insulin (LANTUS SOLOSTAR U-100 INSULIN) glargine 100 units/mL (3mL) SubQ pen Inject 60 Units into the skin every evening. 7/9/20 7/9/21  Jesenia Redmond MD   insulin lispro 100 unit/mL injection Inject 14 Units into the skin 3 (three) times daily with meals. 6/23/20 6/23/21  Jesenia Redmond MD   lisinopril (PRINIVIL,ZESTRIL) 20 MG tablet Take 20 mg by mouth once daily.    Historical Provider   loratadine (CLARITIN) 10 mg tablet  9/27/14   Historical Provider   magnesium oxide (MAG-OX) 400 mg tablet Take 400 mg by mouth once daily.    Historical Provider   metFORMIN (GLUCOPHAGE) 1000 MG tablet Take 1 tablet (1,000 mg total) by mouth 2 (two) times daily. 6/23/20 6/23/21  Jesenia Redmond,  "MD   metoprolol succinate (TOPROL-XL) 50 MG 24 hr tablet Take 50 mg by mouth once daily.    Historical Provider   NOVOFINE 32 32 x 1/4 " Ndle  8/3/14   Historical Provider   omega-3 fatty acids 1,000 mg Cap Take 2 capsules (2,000 mg total) by mouth 2 (two) times daily. Take as directed for triglycerides and to decrease risk of heart disease and stroke. 6/23/20 6/23/21  Jesenia Redmond MD   oxybutynin (DITROPAN) 5 MG Tab TK 1 T PO BID 3/16/19   Historical Provider   propranolol (INDERAL LA) 160 mg 24 hr capsule  9/8/14   Historical Provider   simvastatin (ZOCOR) 40 MG tablet TAKE 1 TABLET BY MOUTH EVERY MORNING. 5/17/21   Jesenia Redmond MD   SITagliptin (JANUVIA) 100 MG Tab Take 100 mg by mouth once daily.    Historical Provider   traMADoL (ULTRAM) 50 mg tablet Take 2 tablets (100 mg total) by mouth every 8 (eight) hours as needed for Pain. 9/19/21   Arturo Fowler MD   triamterene-hydrochlorothiazide 37.5-25 mg (DYAZIDE) 37.5-25 mg per capsule Take 1 capsule by mouth every morning.    Historical Provider   VOLTAREN 1 % Gel  8/29/14   Historical Provider   zolpidem (AMBIEN) 10 mg Tab Take 5 mg by mouth nightly as needed.    Historical Provider     CURRENT MEDICATIONS   amLODIPine  5 mg Oral Daily    aspirin  81 mg Oral Daily    atorvastatin  20 mg Oral Daily    busPIRone  15 mg Oral Daily    carvediloL  25 mg Oral BID WM    fluticasone furoate-vilanteroL  1 puff Inhalation Daily    gabapentin  600 mg Oral BID    guaiFENesin  600 mg Oral BID    heparin (porcine)  5,000 Units Subcutaneous Q8H    insulin detemir U-100  30 Units Subcutaneous QHS    levoFLOXacin  750 mg Intravenous Q24H    lisinopriL  20 mg Oral Daily    mupirocin   Nasal BID    oxybutynin  5 mg Oral BID    vancomycin (VANCOCIN) IVPB  1,250 mg Intravenous Q12H     acetaminophen, acetaminophen, albuterol-ipratropium, benzonatate, dextrose 50%, dextrose 50%, glucagon (human recombinant), glucose, glucose, hydrALAZINE, " HYDROcodone-acetaminophen, insulin aspart U-100, magnesium oxide, magnesium oxide, naloxone, ondansetron, polyethylene glycol, potassium, sodium phosphates, potassium, sodium phosphates, potassium, sodium phosphates, promethazine, sodium chloride 0.9%, trazodone, Pharmacy to dose Vancomycin consult **AND** vancomycin - pharmacy to dose      REVIEW OF SYSTEMS  General: no weight loss, + fever, no chills, no anorexia  Skin: no rash  Eyes: no blurry vision  Ears/Nose/Throat: no nasal congestion, no sore throat  Respiratory: no cough, no dyspnea, no wheezing  Cardiovascular: no chest pain, no ankle swelling + shortness of breath  Gastrointestinal: no nausea, no vomiting, no diarrhea, no constipation, no abdominal pain. No melena, no bright red blood per rectum  Genitourinary: no dysuria, no hematuria  Musculoskeletal: no joint pain, no myalgia, no muscle weakness  Neurologic: no seizures, no tremors, no fainting  Hematologic/Lymphatic: no abnormal bleeding, no abnormal bruising  Endocrine: no heat or cold intolerance, no polydipsia, no polyuria  Psychiatric: no anxiety, no depression  Allergy/Immunology: no seasonal allergies, no joint stiffness in morning      PHYSICAL EXAM  Vital Signs:  Temp:  [97.6 °F (36.4 °C)-99 °F (37.2 °C)] 99 °F (37.2 °C)  Pulse:  [] 65  Resp:  [17-22] 20  SpO2:  [90 %-98 %] 94 %  BP: (116-197)/(53-92) 125/62  Constitutional:  Healthy, no distress  HENT:  Normocephalic, Atraumatic, Bilateral external ears normal, Oropharynx moist, No oral exudates, No tenderness, neck supple.  Eyes:  PERRL, EOMI, Conjunctiva normal, No discharge.   Lymphatic:  No cervical or supraclavicular lymphadenopathy noted.   Cardiovascular:  Normal heart rate, Normal rhythm, 1 to 2/6 systolic ejection murmur left sternal border No rubs, No gallops.   Respiratory:  Normal breath sounds, No respiratory distress, No wheezing, No rhonchi, No rales, No chest tenderness.   GI:  Bowel sounds normal, Soft, No tenderness,  No rebound or guarding, No masses.   Integument:  Warm, Dry, No erythema, No rash.   Musculoskeletal/Extremities:  Intact distal pulses, No edema, No tenderness, No cyanosis, No clubbing. Good range of motion in all major joints.   Neurologic:  Alert & oriented x 3, cranial nerves grossly intact, No focal deficits.    LABS    CBC  Recent Labs   Lab 03/26/22  0624 03/27/22  0619 03/28/22  0606   WBC 17.98* 15.26* 13.51*   RBC 3.45* 3.35* 3.35*   HGB 10.5* 10.1* 10.0*   HCT 31.9* 31.4* 30.7*   MCV 93 94 92   MCH 30.4 30.1 29.9   MCHC 32.9 32.2 32.6   RDW 14.3 14.1 13.7   MPV 11.0 10.4 10.2    206 205       Chemistry  Recent Labs   Lab 03/25/22  1124 03/26/22  0624 03/27/22  0619 03/28/22  0606 03/29/22  0608 03/30/22  0606    138 141 142  --   --    K 3.6 3.4* 3.7 4.0  --   --     101 104 104  --   --    CO2 24 24 28 27  --   --    BUN 17 17 11 10 12 9   CREATININE 0.9 0.8 0.8 0.8 0.8 0.8   * 123* 145* 135*  --   --    PROT 7.6  --   --   --   --   --    CALCIUM 9.1 8.6* 9.1 9.2  --   --    BILITOT 0.8  --   --   --   --   --    AST 19  --   --   --   --   --    ALT 18  --   --   --   --   --        ABG  No results for input(s): PHART, YCJ3VXT, PO2ART, FMN1XFF, J6RRCDWT, BEART, Q8ZZCBVZ in the last 168 hours.    IMAGING STUDIES & OTHER STUDIES  Reviewed          ASSESSMENT  Active Hospital Problems    Diagnosis  POA    *Pneumonia of right lower lobe due to infectious organism [J18.9]  Yes    PAF (paroxysmal atrial fibrillation) [I48.0]  No    Essential hypertension [I10]  Yes    Type 2 diabetes mellitus with hyperglycemia, with long-term current use of insulin [E11.65, Z79.4]  Not Applicable    Mixed diabetic hyperlipidemia associated with type 2 diabetes mellitus [E11.69, E78.2]  Yes      Resolved Hospital Problems   No resolved problems to display.       PLAN    Paroxysmal atrial fibrillation  Transient atrial fibrillation rate 116 a.m. 3/30  Coreg 25 mg b.i.d.  Possibly secondary to  pneumonia  Jean-Paul Vasc 2   4    Hypertension   monitor blood pressure    Hyperlipidemia  Atorvastatin 20 mg daily    Pneumonia  Treatment per Medicine    Diabetes  Treatment per Medicine    Anemia  Hemoglobin 10.0 3/30    Plan  DC subQ heparin  Lovenox 1 milligram/kilogram subQ daily  Protonix 40 mg p.o. daily  Monitor H&H because of anemia  Rythmol 150 mg b.i.d.  Echo pending  EKG in a.m.  Troponin    The plan was discussed with the patient and/or family.    Thank you for the Consult.    Electronically signed by: Shiva Aldridge, 3/30/2022 1:44 PM     Time spent on counseling/coordination of care:   Total time spent with patient:

## 2022-03-30 NOTE — NURSING
"Called and spoke with Dr. Aldridge. Informed on consult for new onset Afib. Dr. Aldridge stated "ok". No orders given.  "

## 2022-03-30 NOTE — PT/OT/SLP PROGRESS
Patient having Echo procedure performed in room at time of attempted PT visit. PT to follow and resume treatment as indicated.

## 2022-03-30 NOTE — NURSING
Notified Dr. ALMA Miranda vis Instant chat of /74 and that Hydralazine had been given at 1717. Awaiting response.

## 2022-03-30 NOTE — PLAN OF CARE
Patient in no apparent distress. Sat's  92-96% on 1 lpm.Aerosol treatments given. Aerobika and IS done. . Will continue to monitor.

## 2022-03-31 VITALS
RESPIRATION RATE: 18 BRPM | HEART RATE: 61 BPM | SYSTOLIC BLOOD PRESSURE: 102 MMHG | DIASTOLIC BLOOD PRESSURE: 50 MMHG | OXYGEN SATURATION: 95 % | WEIGHT: 200 LBS | HEIGHT: 65 IN | BODY MASS INDEX: 33.32 KG/M2 | TEMPERATURE: 98 F

## 2022-03-31 LAB
ANION GAP SERPL CALC-SCNC: 10 MMOL/L (ref 8–16)
BASOPHILS # BLD AUTO: ABNORMAL K/UL (ref 0–0.2)
BASOPHILS NFR BLD: 0 % (ref 0–1.9)
BSA FOR ECHO PROCEDURE: 2.04 M2
BUN SERPL-MCNC: 12 MG/DL (ref 8–23)
CALCIUM SERPL-MCNC: 9.2 MG/DL (ref 8.7–10.5)
CHLORIDE SERPL-SCNC: 101 MMOL/L (ref 95–110)
CO2 SERPL-SCNC: 28 MMOL/L (ref 23–29)
CREAT SERPL-MCNC: 0.9 MG/DL (ref 0.5–1.4)
DIFFERENTIAL METHOD: ABNORMAL
EJECTION FRACTION: 60 %
EOSINOPHIL # BLD AUTO: ABNORMAL K/UL (ref 0–0.5)
EOSINOPHIL NFR BLD: 4 % (ref 0–8)
ERYTHROCYTE [DISTWIDTH] IN BLOOD BY AUTOMATED COUNT: 13.5 % (ref 11.5–14.5)
EST. GFR  (AFRICAN AMERICAN): >60 ML/MIN/1.73 M^2
EST. GFR  (NON AFRICAN AMERICAN): >60 ML/MIN/1.73 M^2
GLUCOSE SERPL-MCNC: 214 MG/DL (ref 70–110)
HCT VFR BLD AUTO: 31.5 % (ref 37–48.5)
HGB BLD-MCNC: 10.4 G/DL (ref 12–16)
IMM GRANULOCYTES # BLD AUTO: ABNORMAL K/UL (ref 0–0.04)
IMM GRANULOCYTES NFR BLD AUTO: ABNORMAL % (ref 0–0.5)
LYMPHOCYTES # BLD AUTO: ABNORMAL K/UL (ref 1–4.8)
LYMPHOCYTES NFR BLD: 38 % (ref 18–48)
MAGNESIUM SERPL-MCNC: 1.9 MG/DL (ref 1.6–2.6)
MCH RBC QN AUTO: 29.9 PG (ref 27–31)
MCHC RBC AUTO-ENTMCNC: 33 G/DL (ref 32–36)
MCV RBC AUTO: 91 FL (ref 82–98)
MONOCYTES # BLD AUTO: ABNORMAL K/UL (ref 0.3–1)
MONOCYTES NFR BLD: 1 % (ref 4–15)
NEUTROPHILS # BLD AUTO: ABNORMAL K/UL (ref 1.8–7.7)
NEUTROPHILS NFR BLD: 57 % (ref 38–73)
NRBC BLD-RTO: 0 /100 WBC
PLATELET # BLD AUTO: 284 K/UL (ref 150–450)
PMV BLD AUTO: 9.8 FL (ref 9.2–12.9)
POCT GLUCOSE: 208 MG/DL (ref 70–110)
POCT GLUCOSE: 283 MG/DL (ref 70–110)
POCT GLUCOSE: 367 MG/DL (ref 70–110)
POTASSIUM SERPL-SCNC: 3.9 MMOL/L (ref 3.5–5.1)
PV MEAN GRADIENT: 4 MMHG
RA WIDTH: 2 CM
RBC # BLD AUTO: 3.48 M/UL (ref 4–5.4)
SODIUM SERPL-SCNC: 139 MMOL/L (ref 136–145)
TROPONIN I SERPL DL<=0.01 NG/ML-MCNC: 0.01 NG/ML (ref 0–0.03)
WBC # BLD AUTO: 14.16 K/UL (ref 3.9–12.7)

## 2022-03-31 PROCEDURE — 25000003 PHARM REV CODE 250: Performed by: HOSPITALIST

## 2022-03-31 PROCEDURE — 97535 SELF CARE MNGMENT TRAINING: CPT

## 2022-03-31 PROCEDURE — 36415 COLL VENOUS BLD VENIPUNCTURE: CPT | Performed by: INTERNAL MEDICINE

## 2022-03-31 PROCEDURE — 27000221 HC OXYGEN, UP TO 24 HOURS

## 2022-03-31 PROCEDURE — 25000242 PHARM REV CODE 250 ALT 637 W/ HCPCS: Performed by: FAMILY MEDICINE

## 2022-03-31 PROCEDURE — 99238 PR HOSPITAL DISCHARGE DAY,<30 MIN: ICD-10-PCS | Mod: ,,, | Performed by: HOSPITALIST

## 2022-03-31 PROCEDURE — 25000003 PHARM REV CODE 250: Performed by: FAMILY MEDICINE

## 2022-03-31 PROCEDURE — 99238 HOSP IP/OBS DSCHRG MGMT 30/<: CPT | Mod: ,,, | Performed by: HOSPITALIST

## 2022-03-31 PROCEDURE — 85007 BL SMEAR W/DIFF WBC COUNT: CPT | Performed by: INTERNAL MEDICINE

## 2022-03-31 PROCEDURE — 25000003 PHARM REV CODE 250: Performed by: INTERNAL MEDICINE

## 2022-03-31 PROCEDURE — 85027 COMPLETE CBC AUTOMATED: CPT | Performed by: INTERNAL MEDICINE

## 2022-03-31 PROCEDURE — 99900031 HC PATIENT EDUCATION (STAT)

## 2022-03-31 PROCEDURE — 94640 AIRWAY INHALATION TREATMENT: CPT

## 2022-03-31 PROCEDURE — 83735 ASSAY OF MAGNESIUM: CPT | Performed by: INTERNAL MEDICINE

## 2022-03-31 PROCEDURE — 84484 ASSAY OF TROPONIN QUANT: CPT | Performed by: INTERNAL MEDICINE

## 2022-03-31 PROCEDURE — 80048 BASIC METABOLIC PNL TOTAL CA: CPT | Performed by: INTERNAL MEDICINE

## 2022-03-31 PROCEDURE — 1111F DSCHRG MED/CURRENT MED MERGE: CPT | Mod: CPTII,,, | Performed by: HOSPITALIST

## 2022-03-31 PROCEDURE — 94799 UNLISTED PULMONARY SVC/PX: CPT

## 2022-03-31 PROCEDURE — 99900035 HC TECH TIME PER 15 MIN (STAT)

## 2022-03-31 PROCEDURE — 94664 DEMO&/EVAL PT USE INHALER: CPT

## 2022-03-31 PROCEDURE — 93005 ELECTROCARDIOGRAM TRACING: CPT

## 2022-03-31 PROCEDURE — 94761 N-INVAS EAR/PLS OXIMETRY MLT: CPT

## 2022-03-31 PROCEDURE — 97110 THERAPEUTIC EXERCISES: CPT

## 2022-03-31 PROCEDURE — 1111F PR DISCHARGE MEDS RECONCILED W/ CURRENT OUTPATIENT MED LIST: ICD-10-PCS | Mod: CPTII,,, | Performed by: HOSPITALIST

## 2022-03-31 RX ORDER — IPRATROPIUM BROMIDE AND ALBUTEROL SULFATE 2.5; .5 MG/3ML; MG/3ML
SOLUTION RESPIRATORY (INHALATION)
Status: DISCONTINUED
Start: 2022-03-31 | End: 2022-03-31 | Stop reason: HOSPADM

## 2022-03-31 RX ORDER — DOXYCYCLINE 100 MG/1
100 CAPSULE ORAL EVERY 12 HOURS
Qty: 20 CAPSULE | Refills: 0 | Status: SHIPPED | OUTPATIENT
Start: 2022-03-31 | End: 2022-04-10

## 2022-03-31 RX ORDER — AMLODIPINE BESYLATE 5 MG/1
5 TABLET ORAL 2 TIMES DAILY
Status: DISCONTINUED | OUTPATIENT
Start: 2022-03-31 | End: 2022-03-31 | Stop reason: HOSPADM

## 2022-03-31 RX ORDER — CARVEDILOL 25 MG/1
25 TABLET ORAL 2 TIMES DAILY WITH MEALS
Qty: 60 TABLET | Refills: 11 | Status: SHIPPED | OUTPATIENT
Start: 2022-03-31 | End: 2023-04-04

## 2022-03-31 RX ADMIN — IPRATROPIUM BROMIDE AND ALBUTEROL SULFATE 3 ML: 2.5; .5 SOLUTION RESPIRATORY (INHALATION) at 02:03

## 2022-03-31 RX ADMIN — IPRATROPIUM BROMIDE AND ALBUTEROL SULFATE 3 ML: 2.5; .5 SOLUTION RESPIRATORY (INHALATION) at 10:03

## 2022-03-31 RX ADMIN — AMLODIPINE BESYLATE 5 MG: 5 TABLET ORAL at 09:03

## 2022-03-31 RX ADMIN — DOXYCYCLINE 100 MG: 100 INJECTION, POWDER, LYOPHILIZED, FOR SOLUTION INTRAVENOUS at 05:03

## 2022-03-31 RX ADMIN — GABAPENTIN 600 MG: 300 CAPSULE ORAL at 09:03

## 2022-03-31 RX ADMIN — LISINOPRIL 20 MG: 10 TABLET ORAL at 09:03

## 2022-03-31 RX ADMIN — PROPAFENONE HYDROCHLORIDE 150 MG: 150 TABLET, FILM COATED ORAL at 09:03

## 2022-03-31 RX ADMIN — ASPIRIN 81 MG: 81 TABLET, CHEWABLE ORAL at 09:03

## 2022-03-31 RX ADMIN — ATORVASTATIN CALCIUM 20 MG: 10 TABLET, FILM COATED ORAL at 09:03

## 2022-03-31 RX ADMIN — FLUTICASONE FUROATE AND VILANTEROL TRIFENATATE 1 PUFF: 100; 25 POWDER RESPIRATORY (INHALATION) at 10:03

## 2022-03-31 RX ADMIN — INSULIN ASPART 8 UNITS: 100 INJECTION, SOLUTION INTRAVENOUS; SUBCUTANEOUS at 12:03

## 2022-03-31 RX ADMIN — OXYBUTYNIN CHLORIDE 5 MG: 5 TABLET ORAL at 09:03

## 2022-03-31 RX ADMIN — CARVEDILOL 25 MG: 12.5 TABLET, FILM COATED ORAL at 09:03

## 2022-03-31 RX ADMIN — IPRATROPIUM BROMIDE AND ALBUTEROL SULFATE 3 ML: 2.5; .5 SOLUTION RESPIRATORY (INHALATION) at 12:03

## 2022-03-31 RX ADMIN — HYDROCODONE BITARTRATE AND ACETAMINOPHEN 1 TABLET: 5; 325 TABLET ORAL at 02:03

## 2022-03-31 RX ADMIN — PANTOPRAZOLE SODIUM 40 MG: 40 TABLET, DELAYED RELEASE ORAL at 09:03

## 2022-03-31 RX ADMIN — BUSPIRONE HYDROCHLORIDE 15 MG: 5 TABLET ORAL at 09:03

## 2022-03-31 RX ADMIN — GUAIFENESIN 600 MG: 600 TABLET, EXTENDED RELEASE ORAL at 09:03

## 2022-03-31 NOTE — DISCHARGE SUMMARY
[facility] Medicine  Discharge Summary      Patient Name: Syl Matos  MRN: [id number]  Admission Date: 3/25/2022  Hospital Length of Stay: 5 days  Discharge Date and Time:  03/31/2022 12:49 PM  Attending Physician: Lexy Kimbrough MD   Discharging Provider: Gibson Almendarez MD  Primary Care Provider: Luis Diaz MD    Admitting diagnosis:    1. Pneumonia    2. Type 2 diabetes mellitus    3. Paroxysmal atrial fibrillation    4. Essential hypertension    Final diagnosis:    1. Pneumonia secondary to MRSA    2. Type 2 diabetes mellitus    3. Paroxysmal atrial fibrillation    4. Essential hypertension    5. Mixed hyperlipidemia     Reason for hospitalization:  To treat pneumonia.           HPI:  66-year-old female with a history of hypertension, hyperlipidemia, type 2 diabetes mellitus and PA F has been having cough, fever, shortness of breath for several days.  These symptoms have been getting progressively worse.  She decided come to the emergency room and her chest x-ray indicated right lower lobe pneumonia.  She had a temperature 101.4°.  Hospitalist service was asked to admit and treat for pneumonia.    * No surgery found *      Hospital Course:  She was treated with IV vancomycin and Levaquin.  She was also placed on low-flow oxygen and given breathing treatments and IV steroids.  Her blood sugar went up with steroid therapy it and this was treated with basal insulin and sliding scale insulin.  She did have PA F for brief period of time and converted back to normal sinus rhythm.  Cardiology consult was obtained.  Coreg was increased from 12.5 mg twice a day to 25 mg twice a day.  She not had any more episodes of PAF.  Patient improved with treatment.  Sputum CNS reveal MRSA.  She was continued on Levaquin and vancomycin until 03/30/2022 and on that date her IV antibiotics were changed from Levaquin and vancomycin to Vibramycin 100 mg twice a day.  MRSA was sensitive to  "tetracycline.  Patient was placed on respiratory isolation once MRSA results came back from the lab.  Blood pressure was elevated but became control after increasing Norvasc to 5 mg twice a day.  It was felt that the patient was taking too many beta blockers and should only be taking Coreg for the time being.  Propanolol and metoprolol were discontinued.  Patient is medically stable for discharge.  Cardiology has given the okay for discharge.  She will need follow-up for her pneumonia by her PCP.  Arrangements have been made for home health visits and oxygen therapy at home.    Consults:   Consults (From admission, onward)        Status Ordering Provider     Inpatient consult to Cardiology  Once        Provider:  Shiva Aldridge MD    Completed CRESENCIO FLEMING          Final Active Diagnoses:    Diagnosis Date Noted POA    PRINCIPAL PROBLEM:  Pneumonia of right lower lobe due to infectious organism [J18.9] 03/25/2022 Yes    PAF (paroxysmal atrial fibrillation) [I48.0] 03/30/2022 No    Essential hypertension [I10] 06/23/2020 Yes    Type 2 diabetes mellitus with hyperglycemia, with long-term current use of insulin [E11.65, Z79.4] 06/23/2020 Not Applicable    Mixed diabetic hyperlipidemia associated with type 2 diabetes mellitus [E11.69, E78.2] 06/23/2020 Yes      Problems Resolved During this Admission:      Discharged Condition: stable    Disposition: Home-Health Care c    Follow Up:    Patient Instructions:      OXYGEN FOR HOME USE     Order Specific Question Answer Comments   Liter Flow 2    Duration Continuous    Qualifying Test Performed at: Activity    Oxygen saturation at rest 92    Oxygen saturation with activity 87    Oxygen saturation with activity on oxygen 96    Portable mode: continuous    Route nasal cannula    Device: home concentrator with portable tanks    Length of need (in months): 99 mos    Patient condition with qualifying saturation CHF    Height: 5' 5" (1.651 m)    Weight: 90.7 kg (200 " lb)    Alternative treatment measures have been tried or considered and deemed clinically ineffective. Yes      SUBSEQUENT HOME HEALTH ORDERS   Order Comments: Subsequent Home Health Orders    Current Medications:  Current Facility-Administered Medications:  acetaminophen tablet 650 mg, 650 mg, Oral, Q4H PRN, Lexy Kimbrough MD, 650 mg at 03/29/22 2241  acetaminophen tablet 650 mg, 650 mg, Oral, Q8H PRN, Lexy Kimbrough MD, 650 mg at 03/26/22 1818  albuterol-ipratropium 2.5 mg-0.5 mg/3 mL nebulizer solution 3 mL, 3 mL, Nebulization, Q6H PRN, Lexy Kimbrough MD, 3 mL at 03/30/22 0658  amLODIPine tablet 5 mg, 5 mg, Oral, Daily, Gibson Almendarez MD, 5 mg at 03/30/22 0813  aspirin chewable tablet 81 mg, 81 mg, Oral, Daily, Lexy Kimbrough MD, 81 mg at 03/30/22 0812  atorvastatin tablet 20 mg, 20 mg, Oral, Daily, Lexy Kimbrough MD, 20 mg at 03/30/22 0813  benzonatate capsule 100 mg, 100 mg, Oral, TID PRN, Vaishali Miranda MD, 100 mg at 03/26/22 0350  busPIRone tablet 15 mg, 15 mg, Oral, Daily, Lexy Kimbrough MD, 15 mg at 03/30/22 0814  carvediloL tablet 25 mg, 25 mg, Oral, BID WM, Gibson Almendarez MD, 25 mg at 03/30/22 0814  dextrose 50% injection 12.5 g, 12.5 g, Intravenous, PRN, Lexy Kimbrough MD  dextrose 50% injection 25 g, 25 g, Intravenous, PRN, Lexy Kimbrough MD  fluticasone furoate-vilanteroL 100-25 mcg/dose diskus inhaler 1 puff, 1 puff, Inhalation, Daily, Lexy Kimbrough MD, 1 puff at 03/30/22 0706  gabapentin capsule 600 mg, 600 mg, Oral, BID, Lexy Kimbrough MD, 600 mg at 03/30/22 0813  glucagon (human recombinant) injection 1 mg, 1 mg, Intramuscular, PRN, Lexy Kimbrough MD  glucose chewable tablet 16 g, 16 g, Oral, PRN, Lexy Kimbrough MD  glucose chewable tablet 24 g, 24 g, Oral, PRN, Lexy Kimbrough MD  guaiFENesin 12 hr tablet 600 mg, 600 mg, Oral, BID, Vaishali Miranda MD, 600 mg at 03/30/22 0813  heparin (porcine) injection 5,000 Units, 5,000  Units, Subcutaneous, Q8H, Lexy Kimbrough MD, 5,000 Units at 03/30/22 0503  hydrALAZINE injection 20 mg, 20 mg, Intravenous, Q6H PRN, Vaishali Miranda MD, 20 mg at 03/29/22 2035  HYDROcodone-acetaminophen 5-325 mg per tablet 1 tablet, 1 tablet, Oral, Q6H PRN, Lexy Kimbrough MD, 1 tablet at 03/27/22 2137  insulin aspart U-100 pen 1-10 Units, 1-10 Units, Subcutaneous, QID (AC + HS) PRN, Lexy Kimbrough MD, 3 Units at 03/29/22 2031  insulin detemir U-100 pen 30 Units, 30 Units, Subcutaneous, QHS, Lexy Kimbrough MD, 30 Units at 03/29/22 2030  levoFLOXacin 750 mg/150 mL IVPB 750 mg, 750 mg, Intravenous, Q24H, Lexy Kimbrough MD, Stopped at 03/29/22 1859  lisinopriL tablet 20 mg, 20 mg, Oral, Daily, Lexy Kimbrough MD, 20 mg at 03/30/22 0813  magnesium oxide tablet 800 mg, 800 mg, Oral, PRN, Lexy Kimbrough MD  magnesium oxide tablet 800 mg, 800 mg, Oral, PRN, Lexy Kimbrough MD  mupirocin 2 % ointment, , Nasal, BID, Lexy Kimbrough MD, Given at 03/30/22 1035  naloxone 0.4 mg/mL injection 0.02 mg, 0.02 mg, Intravenous, PRN, Lexy Kimbrough MD  ondansetron injection 4 mg, 4 mg, Intravenous, Q8H PRN, Lexy Kimbrough MD, 4 mg at 03/29/22 0747  oxybutynin tablet 5 mg, 5 mg, Oral, BID, Lexy Kimbrough MD, 5 mg at 03/30/22 0814  polyethylene glycol packet 17 g, 17 g, Oral, Daily PRN, Lexy Kimbrough MD  potassium, sodium phosphates 280-160-250 mg packet 2 packet, 2 packet, Oral, PRN, Lexy Kimbrough MD  potassium, sodium phosphates 280-160-250 mg packet 2 packet, 2 packet, Oral, PRN, Lexy Kimbrough MD  potassium, sodium phosphates 280-160-250 mg packet 2 packet, 2 packet, Oral, PRN, Lexy Kimbrough MD  promethazine tablet 25 mg, 25 mg, Oral, Q6H PRN, Lexy Kimbrough MD  sodium chloride 0.9% flush 10 mL, 10 mL, Intravenous, Q8H PRN, Lexy Kimbrough MD  trazodone split tablet 25 mg, 25 mg, Oral, Nightly PRN, Lexy Kimbrough MD, 25 mg at 03/28/22  2157  vancomycin - pharmacy to dose, , Intravenous, pharmacy to manage frequency, Lexy Kimbrough MD  vancomycin 1.25 g in dextrose 5% 250 mL IVPB (ready to mix), 1,250 mg, Intravenous, Q12H, Lexy Kimbrough MD, Stopped at 03/30/22 0012        Nursing:   Diabetic Care:   SN to perform and educate Diabetic management with blood glucose monitoring:, Fingerstick blood sugar a.m. and p.m., and Report CBG < 60 or > 350 to physician.  Home Oxygen:  Oxygen at 2L/min nasal canula to be used:  Continuously. and Notify physician if oxygen saturation less than 88%    Diet:   diabetic diet 1800 calorie    Activities:   activity as tolerated    Labs:  SN to perform labs:  CBC: Biweekly; 3 week(s) and CMP: Biweekly; 3week(s) and Report Lab results to PCP.    Referrals/ Consults  Physical Therapy to evaluate and treat. Evaluate for home safety and equipment needs; Establish/upgrade home exercise program. Perform / instruct on therapeutic exercises, gait training, transfer training, and Range of Motion.  Occupational Therapy to evaluate and treat. Evaluate home environment for safety and equipment needs. Perform/Instruct on transfers, ADL training, ROM, and therapeutic exercises.  Aide to provide assistance with personal care, ADLs, and vital signs.    Home Health Aide:  Nursing Three times weekly, Physical Therapy Three times weekly, Occupational Therapy Three times weekly, and Home Health Aide Three times weekly     Order Specific Question Answer Comments   What Home Health Agency is the patient currently using? Other/External      Medications:  Reconciled Home Medications:      Medication List      START taking these medications    doxycycline 100 MG Cap  Commonly known as: VIBRAMYCIN  Take 1 capsule (100 mg total) by mouth every 12 (twelve) hours. for 10 days        CHANGE how you take these medications    carvediloL 25 MG tablet  Commonly known as: COREG  Take 1 tablet (25 mg total) by mouth 2 (two) times daily with  meals.  What changed:   · medication strength  · See the new instructions.        CONTINUE taking these medications    albuterol 90 mcg/actuation inhaler  Commonly known as: PROVENTIL/VENTOLIN HFA  Inhale 1-2 puffs into the lungs every 6 (six) hours as needed for Wheezing or Shortness of Breath. Rescue     amLODIPine 10 MG tablet  Commonly known as: NORVASC  Take 1 tablet (10 mg total) by mouth once daily.     aspirin 81 MG Chew  Take 81 mg by mouth once daily.     busPIRone 15 MG tablet  Commonly known as: BUSPAR  Take 15 mg by mouth once daily.     cholecalciferol (vitamin D3) 50 mcg (2,000 unit) Tab  Commonly known as: VITAMIN D3  Take 1 tablet (2,000 Units total) by mouth once daily.     cyanocobalamin 1,000 mcg/mL injection  INJECT 1ML (1000MCG TOTAL) INTO THE MUSCLE EVERY 28 DAYS.     FARXIGA 5 mg Tab tablet  Generic drug: dapagliflozin  TAKE 1 TABLET BY MOUTH ONCE DAILY     fluticasone-salmeterol 250-50 mcg/dose 250-50 mcg/dose diskus inhaler  Commonly known as: ADVAIR  Inhale 1 puff into the lungs 2 (two) times daily. Controller     furosemide 20 MG tablet  Commonly known as: LASIX  Take 20 mg by mouth 2 (two) times daily.     gabapentin 300 MG capsule  Commonly known as: NEURONTIN  Take 600 mg by mouth 2 (two) times daily.     HYDROcodone-acetaminophen  mg per tablet  Commonly known as: NORCO     insulin lispro 100 unit/mL injection  Inject 14 Units into the skin 3 (three) times daily with meals.     LANTUS SOLOSTAR U-100 INSULIN glargine 100 units/mL (3mL) SubQ pen  Generic drug: insulin  Inject 60 Units into the skin every evening.     lisinopriL 20 MG tablet  Commonly known as: PRINIVIL,ZESTRIL  Take 20 mg by mouth once daily.     loratadine 10 mg tablet  Commonly known as: CLARITIN     magnesium oxide 400 mg (241.3 mg magnesium) tablet  Commonly known as: MAG-OX  Take 400 mg by mouth once daily.     metFORMIN 1000 MG tablet  Commonly known as: GLUCOPHAGE  Take 1 tablet (1,000 mg total) by mouth 2  "(two) times daily.     NOVOFINE 32 32 gauge x 1/4" Ndle  Generic drug: pen needle, diabetic     omega-3 fatty acids 1,000 mg Cap  Take 2 capsules (2,000 mg total) by mouth 2 (two) times daily. Take as directed for triglycerides and to decrease risk of heart disease and stroke.     oxybutynin 5 MG Tab  Commonly known as: DITROPAN  TK 1 T PO BID     simvastatin 40 MG tablet  Commonly known as: ZOCOR  TAKE 1 TABLET BY MOUTH EVERY MORNING.     SITagliptin 100 MG Tab  Commonly known as: JANUVIA  Take 100 mg by mouth once daily.     traMADoL 50 mg tablet  Commonly known as: ULTRAM  Take 2 tablets (100 mg total) by mouth every 8 (eight) hours as needed for Pain.     triamterene-hydrochlorothiazide 37.5-25 mg 37.5-25 mg per capsule  Commonly known as: DYAZIDE  Take 1 capsule by mouth every morning.     VOLTAREN 1 % Gel  Generic drug: diclofenac sodium     zolpidem 10 mg Tab  Commonly known as: AMBIEN  Take 5 mg by mouth nightly as needed.        STOP taking these medications    amoxicillin 875 MG tablet  Commonly known as: AMOXIL     metoprolol succinate 50 MG 24 hr tablet  Commonly known as: TOPROL-XL     propranoloL 160 mg 24 hr capsule  Commonly known as: INDERAL LA            Significant Diagnostic Studies: Radiology: X-Ray: CXR: X-Ray Chest 1 View (CXR): No results found for this visit on 03/25/22.    Pending Diagnostic Studies:     Procedure Component Value Units Date/Time    EKG 12-lead [[id number]]     Order Status: Sent Lab Status: No result     EKG 12-lead [[id number]]     Order Status: Sent Lab Status: No result     Troponin I [[id number]]     Order Status: Sent Lab Status: No result     Specimen: Blood         Indwelling Lines/Drains at time of discharge:   Lines/Drains/Airways     Drain  Duration           Female External Urinary Catheter 03/25/22 2000 5 days                Time spent on the discharge of patient: 27  minutes         Gibson Almendarez MD  Department of Hospital Medicine  [facility]    "

## 2022-03-31 NOTE — CARE UPDATE
03/31/22 1350   Patient Assessment/Suction   Level of Consciousness (AVPU) alert   Respiratory Effort Normal;Unlabored   PRE-TX-O2   O2 Device (Oxygen Therapy) room air   SpO2 (!) 93 %   Pulse 60   Resp 18   Home Oxygen Qualification   $ Home O2 Qualification Tech time 15 minutes   Room Air SpO2 At Rest 97 %   Room Air SpO2 During Ambulation 95 %   SpO2 Post Ambulation 93 %   Post Ambulation Heart Rate 60 bpm

## 2022-03-31 NOTE — PLAN OF CARE
03/30/22 1615   Medicare Message   Important Message from Medicare regarding Discharge Appeal Rights Given to patient/caregiver;Explained to patient/caregiver;Signed/date by patient/caregiver   Date IMM was signed 03/30/22   Time IMM was signed 1618

## 2022-03-31 NOTE — PT/OT/SLP PROGRESS
Occupational Therapy   Treatment    Name: Syl Matos  MRN: [id number]  Admitting Diagnosis:  Pneumonia of right lower lobe due to infectious organism       Recommendations:     Discharge Recommendations:  home w/ hh  Discharge Equipment Recommendations:   rw  Barriers to discharge:   none    Assessment:     Syl Matos is a 66 y.o. female with a medical diagnosis of Pneumonia of right lower lobe due to infectious organism.  She presents with decreased Ind w/ all adls. Performance deficits affecting function are  weakness, decreased functional activity tolerance.       Rehab Prognosis:  Good; patient would benefit from acute skilled OT services to address these deficits and reach maximum level of function.       Plan:     Patient to be seen   3-5x's per weekto address the above listed problems via  ot intervention  · Plan of Care Expires:  upon dc   · Plan of Care Reviewed with:  patient/family    Subjective     Pain/Comfort:  ·  patient w/ no c/o pain      Objective:     Communicated with: nursing prior to session.  Patient found supine with   upon OT entry to room.    General Precautions: Standard,     Orthopedic Precautions:    Braces:    Respiratory Status: Nasal cannula, flow 2 L/min     Occupational Performance:     Bed Mobility:    · Patient completed Rolling/Turning to Left with  independence  · Patient completed Rolling/Turning to Right with independence  · Patient completed Scooting/Bridging with independence  · Patient completed Supine to Sit with independence  · Patient completed Sit to Supine with independence     Functional Mobility/Transfers:  · Patient completed Sit <> Stand Transfer with modified independence  with  hand-held assist   · Patient completed Bed <> Chair Transfer using Stand Pivot technique with modified independence with hand-held assist  · Functional Mobility: patient ambulated from bed to chair w/ sba           Treatment & Education:  Patient performed BUE AROM EXERCISES  3  set 15 reps all planes of ue movement.  Patient performed static/dynamic stand balance activities w/ use of functional reach tasks for approx 10 min cont stand. Patient performed sit to stand tasks 2 sets 10 reps from sit on eob.   Patient performed lb dressing, toileting, and g/h in stand w/ Mod Ind.        Patient left in sit on eob w/ all lines intact      GOALS: GOALS:   1. PATIENT WILL PERFORM  LB DRESSING W/ SUA  2. PATIENT WILL INCREASE FUNCTIONAL ACTIVITY TOLERANCE TO GOOD IN ORDER TO FACILITATE OPTIMAL ADL PERFORMANCE.   3. PATIENT WILL  PERFORM  TOILETING TASKS W/ MOD IND.       Time Tracking:     OT Date of Treatment:  03/31/2022  OT Start Time:  8:40  OT Stop Time:  9:10  OT Total Time (min):  30    Billable Minutes:adl 15 min therex 15               3/31/2022

## 2022-03-31 NOTE — PROGRESS NOTES
"CARDIOLOGY PROGRESS NOTE    Patient Name:  Syl Matos    :  1955    Medical Record:  [id number]    DATE OF SERVICE  3/31/2022        SUBJECTIVE  The patient is being seen for follow-up no chest pain or shortness of breath elevated blood pressure 180/77 today    REVIEW OF SYSTEMS  General: No chills, No fever, No fatigue  Eyes: No vision changes, No drainage from eyes  ENT: No nasal congestion, no sore throat  Respiratory: No shortness of breath, No cough  Cardiac: No chest pain, palpitations, dyspnea, dizziness, claudication, or syncopal episodes  GI: No abdominal pain, no nausea, no vomiting  : No dysuria  Musculoskeletal: No joint pain  Neuro: No weakness, dizziness, vision changes       OBJECTIVE          PHYSICAL EXAM  Vital Signs:  BP (!) 102/50 (BP Location: Right arm, Patient Position: Sitting)   Pulse 61   Temp 98.2 °F (36.8 °C) (Oral)   Resp 17   Ht 5' 5" (1.651 m)   Wt 90.7 kg (200 lb)   SpO2 (!) 93%   Breastfeeding No   BMI 33.28 kg/m²   Temp:  [96.3 °F (35.7 °C)-98.4 °F (36.9 °C)] 98.2 °F (36.8 °C)  Pulse:  [58-67] 61  Resp:  [16-20] 17  SpO2:  [93 %-99 %] 93 %  BP: (102-180)/(50-77) 102/50      General:   Eyes: Anicteric sclera. Moist conjunctiva. Vision grossly intact.  HENMT: Normocephalic.  Moist mucous membranes. No obvious ulcerations.   Neck: Trachea midline.   Cardiovascular: Heart regular rate and rhythm. S1, S2, 1 to 2/6 systolic ejection murmur left sternal border  Peripheral pulses palpable. No peripheral edema.   Respiratory: Lungs clear to auscultation bilaterally. No increased work of breathing.  Gastrointestinal: Bowel sounds active in all 4 quadrants. Soft, nontender, nondistended.   Genitourinary: Urine clear yellow  Musculoskeletal: Moves all extremities with equal strength.   Skin: Color normal for ethnicity. Skin warm and dry with good turgor. Capillary refill < 3 seconds.   Neurologic: Oriented x 3.  Speech fluent, follows commands.  Psychiatric:  Awake and " alert, normal affect mood good.      LABS    CBC  Recent Labs   Lab 03/27/22  0619 03/28/22  0606 03/31/22  0638   WBC 15.26* 13.51* 14.16*   RBC 3.35* 3.35* 3.48*   HGB 10.1* 10.0* 10.4*   HCT 31.4* 30.7* 31.5*   MCV 94 92 91   MCH 30.1 29.9 29.9   MCHC 32.2 32.6 33.0   RDW 14.1 13.7 13.5   MPV 10.4 10.2 9.8    205 284       Chemistry  Recent Labs   Lab 03/25/22  1124 03/26/22  0624 03/27/22  0619 03/28/22  0606 03/29/22  0608 03/30/22  0606 03/31/22  0638      < > 141 142  --   --  139   K 3.6   < > 3.7 4.0  --   --  3.9      < > 104 104  --   --  101   CO2 24   < > 28 27  --   --  28   BUN 17   < > 11 10 12 9 12   CREATININE 0.9   < > 0.8 0.8 0.8 0.8 0.9   *   < > 145* 135*  --   --  214*   PROT 7.6  --   --   --   --   --   --    CALCIUM 9.1   < > 9.1 9.2  --   --  9.2   BILITOT 0.8  --   --   --   --   --   --    AST 19  --   --   --   --   --   --    ALT 18  --   --   --   --   --   --     < > = values in this interval not displayed.       ABG  No results for input(s): PHART, FWG7FBV, PO2ART, IJW4COM, M3QQXKBC, BEART, N3SLGPGO in the last 168 hours.      MICROBIOLOGY  Reviewed    IMAGING & OTHER STUDIES  Reviewed      Intake/Output last 3 shifts:  I/O last 3 completed shifts:  In: 1159.2 [P.O.:240; IV Piggyback:919.2]  Out: -     Scheduled meds:   albuterol-ipratropium        amLODIPine  5 mg Oral BID    aspirin  81 mg Oral Daily    atorvastatin  20 mg Oral Daily    busPIRone  15 mg Oral Daily    carvediloL  25 mg Oral BID WM    doxycycline (VIBRAMYCIN) IVPB  100 mg Intravenous Q12H    enoxaparin  1 mg/kg Subcutaneous Q24H    fluticasone furoate-vilanteroL  1 puff Inhalation Daily    gabapentin  600 mg Oral BID    guaiFENesin  600 mg Oral BID    insulin detemir U-100  30 Units Subcutaneous QHS    lisinopriL  20 mg Oral Daily    mupirocin   Nasal BID    oxybutynin  5 mg Oral BID    pantoprazole  40 mg Oral Daily    propafenone  150 mg Oral BID       PRN  Meds:  acetaminophen, acetaminophen, albuterol-ipratropium, benzonatate, dextrose 50%, dextrose 50%, glucagon (human recombinant), glucose, glucose, hydrALAZINE, HYDROcodone-acetaminophen, insulin aspart U-100, magnesium oxide, magnesium oxide, naloxone, ondansetron, polyethylene glycol, potassium, sodium phosphates, potassium, sodium phosphates, potassium, sodium phosphates, promethazine, sodium chloride 0.9%, trazodone    Continuous Infusions:      Dietary Orders:  [unfilled]     Admit weight: Weight: 90.7 kg (200 lb)   Today weight: Weight: 90.7 kg (200 lb)      Length of stay in days: 5      ASSESSMENT    Active Hospital Problems    Diagnosis  POA    *Pneumonia of right lower lobe due to infectious organism [J18.9]  Yes    PAF (paroxysmal atrial fibrillation) [I48.0]  No    Essential hypertension [I10]  Yes    Type 2 diabetes mellitus with hyperglycemia, with long-term current use of insulin [E11.65, Z79.4]  Not Applicable    Mixed diabetic hyperlipidemia associated with type 2 diabetes mellitus [E11.69, E78.2]  Yes      Resolved Hospital Problems   No resolved problems to display.          PLAN       Paroxysmal atrial fibrillation  Sinus rhythm  Transient atrial fibrillation rate 116 a.m. 3/30  Rythmol 150 mg b.i.d.  Coreg 25 mg b.i.d.  EF 60% 3/22  Possibly secondary to pneumonia  Jean-Paul Vasc 2   4     Hypertension  180/77   monitor blood pressure     Hyperlipidemia  Atorvastatin 20 mg daily     Pneumonia  Treatment per Medicine     Diabetes  Treatment per Medicine     Anemia  Hemoglobin 10.9 3/31  Protonix 40 mg p.o. daily     Plan  Increase amlodipine 5 mg b.i.d.  Monitor blood pressure and heart rhythm  Troponin        Electronically signed by: Shiva Aldridge, 3/31/2022 12:28 PM

## 2022-03-31 NOTE — NURSING
D/C instructions provided and explained to pt, understanding of D/C instructions verbalized. IV removed, catheter intact. Tolerated well. Telemetry monitor removed and returned to monitor room. VSS. Pt denies complaints. Transported off unit via wheelchair

## 2022-03-31 NOTE — PLAN OF CARE
03/31/22 1538   Final Note   Assessment Type Final Discharge Note   Anticipated Discharge Disposition Home-Health   What phone number can be called within the next 1-3 days to see how you are doing after discharge? [phone]   Hospital Resources/Appts/Education Provided Appointments scheduled and added to AVS   Post-Acute Status   Post-Acute Authorization Home Health   Home Health Status Set-up Complete/Auth obtained   Coverage [employer]   Patient choice form signed by patient/caregiver List from System Post-Acute Care   Discharge Delays None known at this time   Patient discharged to home with [facility] (patient's choice of agencies).  Hospital follow up appointment was scheduled with PCP and provided to patient verbally and in writing.  Home oxygen orders received; however, patient did not qualify for home oxygen with current medical diagnoses.  Last walk test completed this afternoon also showed that patient does not meet qualifications for home oxygen.  Patient aware and tells SW she is not having shortness of breath since oxygen was removed this afternoon.      No unmet needs identified.  Patient with strong support system.  SW did provide patient with her contact information and encouraged her to call with any problems or concerns post discharge.

## 2022-04-14 ENCOUNTER — OFFICE VISIT (OUTPATIENT)
Dept: PODIATRY | Facility: CLINIC | Age: 67
End: 2022-04-14
Payer: MEDICARE

## 2022-04-14 VITALS
WEIGHT: 200 LBS | DIASTOLIC BLOOD PRESSURE: 59 MMHG | SYSTOLIC BLOOD PRESSURE: 126 MMHG | HEIGHT: 65 IN | RESPIRATION RATE: 18 BRPM | HEART RATE: 58 BPM | BODY MASS INDEX: 33.32 KG/M2

## 2022-04-14 DIAGNOSIS — G57.93 NEUROPATHIC PAIN OF BOTH FEET: ICD-10-CM

## 2022-04-14 DIAGNOSIS — E11.9 COMPREHENSIVE DIABETIC FOOT EXAMINATION, TYPE 2 DM, ENCOUNTER FOR: Primary | ICD-10-CM

## 2022-04-14 DIAGNOSIS — R25.2 FOOT CRAMPS: ICD-10-CM

## 2022-04-14 PROCEDURE — 1125F PR PAIN SEVERITY QUANTIFIED, PAIN PRESENT: ICD-10-PCS | Mod: CPTII,S$GLB,, | Performed by: PODIATRIST

## 2022-04-14 PROCEDURE — 1101F PR PT FALLS ASSESS DOC 0-1 FALLS W/OUT INJ PAST YR: ICD-10-PCS | Mod: CPTII,S$GLB,, | Performed by: PODIATRIST

## 2022-04-14 PROCEDURE — 3008F PR BODY MASS INDEX (BMI) DOCUMENTED: ICD-10-PCS | Mod: CPTII,S$GLB,, | Performed by: PODIATRIST

## 2022-04-14 PROCEDURE — 3288F PR FALLS RISK ASSESSMENT DOCUMENTED: ICD-10-PCS | Mod: CPTII,S$GLB,, | Performed by: PODIATRIST

## 2022-04-14 PROCEDURE — 3074F PR MOST RECENT SYSTOLIC BLOOD PRESSURE < 130 MM HG: ICD-10-PCS | Mod: CPTII,S$GLB,, | Performed by: PODIATRIST

## 2022-04-14 PROCEDURE — 3078F PR MOST RECENT DIASTOLIC BLOOD PRESSURE < 80 MM HG: ICD-10-PCS | Mod: CPTII,S$GLB,, | Performed by: PODIATRIST

## 2022-04-14 PROCEDURE — 1125F AMNT PAIN NOTED PAIN PRSNT: CPT | Mod: CPTII,S$GLB,, | Performed by: PODIATRIST

## 2022-04-14 PROCEDURE — 3288F FALL RISK ASSESSMENT DOCD: CPT | Mod: CPTII,S$GLB,, | Performed by: PODIATRIST

## 2022-04-14 PROCEDURE — 1159F PR MEDICATION LIST DOCUMENTED IN MEDICAL RECORD: ICD-10-PCS | Mod: CPTII,S$GLB,, | Performed by: PODIATRIST

## 2022-04-14 PROCEDURE — 99203 PR OFFICE/OUTPT VISIT, NEW, LEVL III, 30-44 MIN: ICD-10-PCS | Mod: S$GLB,,, | Performed by: PODIATRIST

## 2022-04-14 PROCEDURE — 3074F SYST BP LT 130 MM HG: CPT | Mod: CPTII,S$GLB,, | Performed by: PODIATRIST

## 2022-04-14 PROCEDURE — 4010F PR ACE/ARB THEARPY RXD/TAKEN: ICD-10-PCS | Mod: CPTII,S$GLB,, | Performed by: PODIATRIST

## 2022-04-14 PROCEDURE — 99999 PR PBB SHADOW E&M-EST. PATIENT-LVL V: CPT | Mod: PBBFAC,,, | Performed by: PODIATRIST

## 2022-04-14 PROCEDURE — 1111F DSCHRG MED/CURRENT MED MERGE: CPT | Mod: CPTII,S$GLB,, | Performed by: PODIATRIST

## 2022-04-14 PROCEDURE — 3008F BODY MASS INDEX DOCD: CPT | Mod: CPTII,S$GLB,, | Performed by: PODIATRIST

## 2022-04-14 PROCEDURE — 1160F RVW MEDS BY RX/DR IN RCRD: CPT | Mod: CPTII,S$GLB,, | Performed by: PODIATRIST

## 2022-04-14 PROCEDURE — 1111F PR DISCHARGE MEDS RECONCILED W/ CURRENT OUTPATIENT MED LIST: ICD-10-PCS | Mod: CPTII,S$GLB,, | Performed by: PODIATRIST

## 2022-04-14 PROCEDURE — 4010F ACE/ARB THERAPY RXD/TAKEN: CPT | Mod: CPTII,S$GLB,, | Performed by: PODIATRIST

## 2022-04-14 PROCEDURE — 3078F DIAST BP <80 MM HG: CPT | Mod: CPTII,S$GLB,, | Performed by: PODIATRIST

## 2022-04-14 PROCEDURE — 1159F MED LIST DOCD IN RCRD: CPT | Mod: CPTII,S$GLB,, | Performed by: PODIATRIST

## 2022-04-14 PROCEDURE — 99999 PR PBB SHADOW E&M-EST. PATIENT-LVL V: ICD-10-PCS | Mod: PBBFAC,,, | Performed by: PODIATRIST

## 2022-04-14 PROCEDURE — 1101F PT FALLS ASSESS-DOCD LE1/YR: CPT | Mod: CPTII,S$GLB,, | Performed by: PODIATRIST

## 2022-04-14 PROCEDURE — 99203 OFFICE O/P NEW LOW 30 MIN: CPT | Mod: S$GLB,,, | Performed by: PODIATRIST

## 2022-04-14 PROCEDURE — 1160F PR REVIEW ALL MEDS BY PRESCRIBER/CLIN PHARMACIST DOCUMENTED: ICD-10-PCS | Mod: CPTII,S$GLB,, | Performed by: PODIATRIST

## 2022-04-14 RX ORDER — INSULIN GLARGINE 100 [IU]/ML
60 INJECTION, SOLUTION SUBCUTANEOUS DAILY
COMMUNITY
End: 2023-01-07 | Stop reason: SDUPTHER

## 2022-04-14 RX ORDER — ESCITALOPRAM OXALATE 10 MG/1
10 TABLET ORAL EVERY MORNING
COMMUNITY
Start: 2022-03-13 | End: 2022-04-18 | Stop reason: ALTCHOICE

## 2022-04-14 RX ORDER — PREGABALIN 75 MG/1
75 CAPSULE ORAL 2 TIMES DAILY
COMMUNITY
Start: 2022-03-13 | End: 2024-01-20

## 2022-04-14 RX ORDER — METOPROLOL TARTRATE 50 MG/1
50 TABLET ORAL 2 TIMES DAILY
COMMUNITY
Start: 2022-01-13 | End: 2022-09-10 | Stop reason: SDUPTHER

## 2022-04-14 RX ORDER — PRAVASTATIN SODIUM 20 MG/1
20 TABLET ORAL DAILY
COMMUNITY
Start: 2022-01-13

## 2022-04-14 RX ORDER — CYCLOBENZAPRINE HCL 10 MG
10 TABLET ORAL NIGHTLY
Qty: 30 TABLET | Refills: 2 | Status: SHIPPED | OUTPATIENT
Start: 2022-04-14 | End: 2022-07-05

## 2022-04-14 RX ORDER — GLIMEPIRIDE 4 MG/1
4 TABLET ORAL
COMMUNITY

## 2022-04-14 RX ORDER — TRAZODONE HYDROCHLORIDE 50 MG/1
50 TABLET ORAL NIGHTLY
COMMUNITY
Start: 2022-03-03 | End: 2023-07-24 | Stop reason: ALTCHOICE

## 2022-04-14 RX ORDER — METFORMIN HYDROCHLORIDE EXTENDED-RELEASE TABLETS 1000 MG/1
1000 TABLET, FILM COATED, EXTENDED RELEASE ORAL
COMMUNITY
End: 2022-09-10 | Stop reason: ALTCHOICE

## 2022-04-14 RX ORDER — LANCETS
EACH MISCELLANEOUS
COMMUNITY
Start: 2021-12-16 | End: 2022-09-10

## 2022-04-14 RX ORDER — ESCITALOPRAM OXALATE 20 MG/1
20 TABLET ORAL EVERY MORNING
COMMUNITY
Start: 2022-03-03

## 2022-04-14 RX ORDER — AZITHROMYCIN 250 MG/1
250 TABLET, FILM COATED ORAL
COMMUNITY
Start: 2022-03-22 | End: 2022-04-18

## 2022-04-14 RX ORDER — BLOOD-GLUCOSE METER
EACH MISCELLANEOUS 3 TIMES DAILY
COMMUNITY
Start: 2022-02-17 | End: 2023-01-07

## 2022-04-14 RX ORDER — BLOOD SUGAR DIAGNOSTIC
STRIP MISCELLANEOUS 3 TIMES DAILY
COMMUNITY
Start: 2022-02-17 | End: 2023-01-07

## 2022-04-14 RX ORDER — LISINOPRIL 5 MG/1
5 TABLET ORAL DAILY
COMMUNITY
Start: 2022-01-12 | End: 2022-09-10 | Stop reason: ALTCHOICE

## 2022-04-14 RX ORDER — LINAGLIPTIN 5 MG/1
5 TABLET, FILM COATED ORAL DAILY
COMMUNITY
Start: 2021-12-26 | End: 2022-04-18 | Stop reason: SDUPTHER

## 2022-04-14 RX ORDER — HYDROCODONE BITARTRATE AND ACETAMINOPHEN 7.5; 325 MG/1; MG/1
1 TABLET ORAL EVERY 6 HOURS PRN
Qty: 28 TABLET | Refills: 0 | Status: SHIPPED | OUTPATIENT
Start: 2022-04-14 | End: 2022-04-21

## 2022-04-18 NOTE — PROGRESS NOTES
Subjective:       Patient ID: Syl Matos is a 66 y.o. female.    Chief Complaint: Leg Problem, Diabetic Foot Exam, and Foot Pain  Patient presents for diabetic.  Diagnosed with diabetes in 40s.  She feels it is well controlled with A1c 8.0 reports glucose was 102 today.  Recently hospitalized acute respiratory failure.  She does relate history of painful diabetic neuropathy, takes 75 mg Lyrica twice daily, usually helpful, more so during the day. She has been experiencing foot cramping, burning radiating pain in feet, pain up her legs not relieved by any treatment or additional Lyrica.  To prevent side effects she would rather continue Lyrica twice daily.  She has had increased pain in her feet legs the last few days with difficulty sleeping.  This does not usually occur every night.  Pain level 5/10    Past Medical History:   Diagnosis Date    CKD (chronic kidney disease)     COPD (chronic obstructive pulmonary disease)     Diabetes mellitus     Hypercholesteremia     Hypertension      Past Surgical History:   Procedure Laterality Date    FRACTURE SURGERY      pelvis surgery    SHOULDER SURGERY Left     TUBAL LIGATION       Family History   Problem Relation Age of Onset    Heart disease Mother     Heart disease Father      Social History     Socioeconomic History    Marital status:    Tobacco Use    Smoking status: Never Smoker    Smokeless tobacco: Never Used   Substance and Sexual Activity    Alcohol use: No    Drug use: No       Current Outpatient Medications   Medication Sig Dispense Refill    ACCU-CHEK GUIDE GLUCOSE METER Misc 3 (three) times daily. Use to test blood glucose      ACCU-CHEK GUIDE TEST STRIPS Strp 3 (three) times daily. Use to test blood glucose      albuterol (PROVENTIL/VENTOLIN HFA) 90 mcg/actuation inhaler Inhale 1-2 puffs into the lungs every 6 (six) hours as needed for Wheezing or Shortness of Breath. Rescue 18 g 2    aspirin 81 MG Chew Take 81 mg by mouth  "once daily.      busPIRone (BUSPAR) 15 MG tablet Take 15 mg by mouth once daily.       carvediloL (COREG) 25 MG tablet Take 1 tablet (25 mg total) by mouth 2 (two) times daily with meals. 60 tablet 11    cholecalciferol, vitamin D3, (VITAMIN D3) 50 mcg (2,000 unit) Tab Take 1 tablet (2,000 Units total) by mouth once daily.      EScitalopram oxalate (LEXAPRO) 20 MG tablet Take 20 mg by mouth every morning.      fluticasone-salmeterol diskus inhaler 250-50 mcg Inhale 1 puff into the lungs 2 (two) times daily. Controller 60 each 2    furosemide (LASIX) 20 MG tablet Take 20 mg by mouth 2 (two) times daily.      insulin (LANTUS SOLOSTAR U-100 INSULIN) glargine 100 units/mL (3mL) SubQ pen Inject 60 Units into the skin once daily.      lisinopriL (PRINIVIL,ZESTRIL) 5 MG tablet Take 5 mg by mouth once daily.      loratadine (CLARITIN) 10 mg tablet   0    metFORMIN (FORTAMET) 1,000 mg 24hr tablet Take 1,000 mg by mouth daily with breakfast.      metoprolol tartrate (LOPRESSOR) 50 MG tablet Take 50 mg by mouth 2 (two) times daily.      MICROLET LANCET Misc USE TO TEST DAILY      NOVOFINE 32 32 x 1/4 " Ndle   2    oxybutynin (DITROPAN) 5 MG Tab TK 1 T PO BID      pravastatin (PRAVACHOL) 20 MG tablet Take 20 mg by mouth once daily.      pregabalin (LYRICA) 75 MG capsule Take 75 mg by mouth 2 (two) times daily.      simvastatin (ZOCOR) 40 MG tablet TAKE 1 TABLET BY MOUTH EVERY MORNING. 90 tablet 2    SITagliptin (JANUVIA) 100 MG Tab Take 100 mg by mouth once daily.      traMADoL (ULTRAM) 50 mg tablet Take 2 tablets (100 mg total) by mouth every 8 (eight) hours as needed for Pain. 18 tablet 0    traZODone (DESYREL) 50 MG tablet Take 50 mg by mouth nightly.      triamterene-hydrochlorothiazide 37.5-25 mg (DYAZIDE) 37.5-25 mg per capsule Take 1 capsule by mouth every morning.      zolpidem (AMBIEN) 10 mg Tab Take 5 mg by mouth nightly as needed.      amLODIPine (NORVASC) 10 MG tablet Take 1 tablet (10 mg " "total) by mouth once daily. 30 tablet 11    cyclobenzaprine (FLEXERIL) 10 MG tablet Take 1 tablet (10 mg total) by mouth every evening. 30 tablet 2    glimepiride (AMARYL) 4 MG tablet Take 4 mg by mouth.      HYDROcodone-acetaminophen (NORCO) 7.5-325 mg per tablet Take 1 tablet by mouth every 6 (six) hours as needed for Pain. 28 tablet 0    insulin lispro 100 unit/mL injection Inject 14 Units into the skin 3 (three) times daily with meals. 12.6 mL 11    omega-3 fatty acids 1,000 mg Cap Take 2 capsules (2,000 mg total) by mouth 2 (two) times daily. Take as directed for triglycerides and to decrease risk of heart disease and stroke.  0     No current facility-administered medications for this visit.     Review of patient's allergies indicates:   Allergen Reactions    Iodine and iodide containing products Hives       Review of Systems   HENT: Negative for congestion.    Respiratory: Negative for cough and shortness of breath.    Cardiovascular: Negative for leg swelling.   Musculoskeletal: Negative for gait problem.   All other systems reviewed and are negative.      Objective:      Vitals:    04/14/22 1320   BP: (!) 126/59   Pulse: (!) 58   Resp: 18   Weight: 90.7 kg (200 lb)   Height: 5' 5" (1.651 m)     Physical Exam  Vitals and nursing note reviewed.   Constitutional:       General: She is not in acute distress.  Cardiovascular:      Pulses:           Dorsalis pedis pulses are 2+ on the right side and 2+ on the left side.        Posterior tibial pulses are 1+ on the right side and 1+ on the left side.   Pulmonary:      Effort: Pulmonary effort is normal.   Musculoskeletal:         General: No deformity. Normal range of motion.      Right foot: Normal range of motion. No deformity.      Left foot: Normal range of motion. No deformity.   Feet:      Right foot:      Protective Sensation: 6 sites tested. 5 sites sensed.      Skin integrity: Dry skin present.      Left foot:      Protective Sensation: 6 sites " tested. 5 sites sensed.      Skin integrity: Dry skin present.      Toenail Condition: Left toenails are long.   Skin:     Capillary Refill: Capillary refill takes 2 to 3 seconds.   Neurological:      Sensory: Sensory deficit present.      Comments: Lack of sensation distal digits with monofilament testing bilateral feet.  Painful paresthesias foot cramps bilateral, history diabetic neuropathic foot pain   Psychiatric:         Mood and Affect: Mood normal.         Behavior: Behavior normal.                                      Assessment:       1. Comprehensive diabetic foot examination, type 2 DM, encounter for    2. DM type 2, uncontrolled, with neuropathy    3. Neuropathic pain of both feet    4. Foot cramps        Plan:           HYDROCODONE 7.5 MG Q.6H P.R.N. NEUROPATHIC PAIN FEET  FLEXERIL 10 MG ONE AT BEDTIME      Diabetic pedal exam performed.    Reviewed diabetic education  Reviewed diminished sensation in toes, risk for potential complications  Had a lengthy discussion with patient regarding diabetic neuropathy.  Advised A1c of 8.0 is high and she should work on diet to decrease it to the 7.0 range.  Advised tighter control of daily glucose and overall A1c can help with nerve pain in feet and legs.  We also reviewed other benefits of tighter control glucose/diabetes  We discussed Lyrica at length and how this medication can help during the day.  Explained it is very common to have increased pain at night.  We reviewed conservative treatments including massage, topical treatments, soaking warm water/jets/bubbles.  Explained movement therapy in the evening can be very effective especially if performed 30 minutes or more each evening, the longer this is done on a daily basis the more effective it is.  Prescribed hydrocodone 7.5 mg q.6 p.r.n. neuropathic pain feet however I encouraged patient to take 1 at night as needed only as this medication cannot be refilled on a regular basis.  Patient verbalized  understanding  Prescribed Flexeril 1 at bedtime and explained this can be helpful to relax and com muscles and nerves in feet, take this before she takes pain medication few hours before bed.  Can take pain medication if needed few hours after muscle relaxer as long as well tolerated with no side effects  Reviewed wide light appropriate shoes,  especially indoors to protect feet, no flat shoes, slippers or walking in sock or bare feet.    Discussed maintenance of dry skin heels and topical treatments.    Reviewed care and maintenance of and nails, nails debrided   Reviewed need for daily foot checks and instructed patient to contact the office with any area of redness or swelling which has not improved within 3 days.  Patient was in understanding and agreement with treatment plan.  I counseled the patient on their conditions, implications and medical management.  Instructed patient to contact the office with any changes, questions, concerns, worsening of symptoms.   Total face-to-face time, exam, assessment, treatment, discussion, documentation 30 minutes, more than half this time spent on consultation and coordination of care.  Follow up prn 3 months      This note was created using M*Modal voice recognition software that occasionally misinterpreted phrases or words.

## 2022-04-26 ENCOUNTER — LAB VISIT (OUTPATIENT)
Dept: LAB | Facility: HOSPITAL | Age: 67
End: 2022-04-26
Attending: FAMILY MEDICINE
Payer: MEDICARE

## 2022-04-26 DIAGNOSIS — J18.9 UNRESOLVED PNEUMONIA: Primary | ICD-10-CM

## 2022-04-26 LAB
ALBUMIN SERPL BCP-MCNC: 3.7 G/DL (ref 3.5–5.2)
ALP SERPL-CCNC: 57 U/L (ref 55–135)
ALT SERPL W/O P-5'-P-CCNC: 13 U/L (ref 10–44)
ANION GAP SERPL CALC-SCNC: 11 MMOL/L (ref 8–16)
AST SERPL-CCNC: 11 U/L (ref 10–40)
BILIRUB SERPL-MCNC: 0.4 MG/DL (ref 0.1–1)
BUN SERPL-MCNC: 20 MG/DL (ref 8–23)
CALCIUM SERPL-MCNC: 9.3 MG/DL (ref 8.7–10.5)
CHLORIDE SERPL-SCNC: 100 MMOL/L (ref 95–110)
CO2 SERPL-SCNC: 29 MMOL/L (ref 23–29)
CREAT SERPL-MCNC: 1 MG/DL (ref 0.5–1.4)
ERYTHROCYTE [DISTWIDTH] IN BLOOD BY AUTOMATED COUNT: 13.9 % (ref 11.5–14.5)
EST. GFR  (AFRICAN AMERICAN): >60 ML/MIN/1.73 M^2
EST. GFR  (NON AFRICAN AMERICAN): 59.3 ML/MIN/1.73 M^2
GLUCOSE SERPL-MCNC: 208 MG/DL (ref 70–110)
HCT VFR BLD AUTO: 37.2 % (ref 37–48.5)
HGB BLD-MCNC: 12.5 G/DL (ref 12–16)
MCH RBC QN AUTO: 30.3 PG (ref 27–31)
MCHC RBC AUTO-ENTMCNC: 33.6 G/DL (ref 32–36)
MCV RBC AUTO: 90 FL (ref 82–98)
PLATELET # BLD AUTO: 194 K/UL (ref 150–450)
PMV BLD AUTO: 10.2 FL (ref 9.2–12.9)
POTASSIUM SERPL-SCNC: 4.5 MMOL/L (ref 3.5–5.1)
PROT SERPL-MCNC: 8 G/DL (ref 6–8.4)
RBC # BLD AUTO: 4.12 M/UL (ref 4–5.4)
SODIUM SERPL-SCNC: 140 MMOL/L (ref 136–145)
WBC # BLD AUTO: 10.24 K/UL (ref 3.9–12.7)

## 2022-04-26 PROCEDURE — 80053 COMPREHEN METABOLIC PANEL: CPT | Performed by: FAMILY MEDICINE

## 2022-04-26 PROCEDURE — 85027 COMPLETE CBC AUTOMATED: CPT | Performed by: FAMILY MEDICINE

## 2022-04-26 PROCEDURE — 36415 COLL VENOUS BLD VENIPUNCTURE: CPT | Performed by: FAMILY MEDICINE

## 2022-05-03 ENCOUNTER — APPOINTMENT (OUTPATIENT)
Dept: LAB | Facility: HOSPITAL | Age: 67
End: 2022-05-03
Attending: INTERNAL MEDICINE
Payer: MEDICARE

## 2022-05-03 DIAGNOSIS — N39.0 URINARY TRACT INFECTION, SITE NOT SPECIFIED: Primary | ICD-10-CM

## 2022-05-03 LAB
BACTERIA #/AREA URNS HPF: ABNORMAL /HPF
BILIRUB UR QL STRIP: NEGATIVE
CLARITY UR: CLEAR
COLOR UR: YELLOW
GLUCOSE UR QL STRIP: NEGATIVE
HGB UR QL STRIP: ABNORMAL
KETONES UR QL STRIP: NEGATIVE
LEUKOCYTE ESTERASE UR QL STRIP: ABNORMAL
MICROSCOPIC COMMENT: ABNORMAL
NITRITE UR QL STRIP: NEGATIVE
PH UR STRIP: 7 [PH] (ref 5–8)
PROT UR QL STRIP: ABNORMAL
RBC #/AREA URNS HPF: 0 /HPF (ref 0–4)
SP GR UR STRIP: 1.01 (ref 1–1.03)
URN SPEC COLLECT METH UR: ABNORMAL
UROBILINOGEN UR STRIP-ACNC: NEGATIVE EU/DL
WBC #/AREA URNS HPF: >100 /HPF (ref 0–5)

## 2022-05-03 PROCEDURE — 87077 CULTURE AEROBIC IDENTIFY: CPT | Performed by: INTERNAL MEDICINE

## 2022-05-03 PROCEDURE — 87086 URINE CULTURE/COLONY COUNT: CPT | Performed by: INTERNAL MEDICINE

## 2022-05-03 PROCEDURE — 81000 URINALYSIS NONAUTO W/SCOPE: CPT | Performed by: INTERNAL MEDICINE

## 2022-05-03 PROCEDURE — 87088 URINE BACTERIA CULTURE: CPT | Performed by: INTERNAL MEDICINE

## 2022-05-03 PROCEDURE — 87186 SC STD MICRODIL/AGAR DIL: CPT | Performed by: INTERNAL MEDICINE

## 2022-05-06 LAB — BACTERIA UR CULT: ABNORMAL

## 2022-05-26 ENCOUNTER — LAB VISIT (OUTPATIENT)
Dept: LAB | Facility: HOSPITAL | Age: 67
End: 2022-05-26
Attending: FAMILY MEDICINE
Payer: MEDICARE

## 2022-05-26 DIAGNOSIS — E78.5 HYPERLIPEMIA: ICD-10-CM

## 2022-05-26 DIAGNOSIS — I10 ESSENTIAL HYPERTENSION, MALIGNANT: ICD-10-CM

## 2022-05-26 DIAGNOSIS — J18.9 UNRESOLVED PNEUMONIA: Primary | ICD-10-CM

## 2022-05-26 DIAGNOSIS — E11.9 DIABETES MELLITUS WITHOUT COMPLICATION: ICD-10-CM

## 2022-05-26 LAB
ALBUMIN SERPL BCP-MCNC: 3.6 G/DL (ref 3.5–5.2)
ALP SERPL-CCNC: 58 U/L (ref 55–135)
ALT SERPL W/O P-5'-P-CCNC: 16 U/L (ref 10–44)
ANION GAP SERPL CALC-SCNC: 12 MMOL/L (ref 8–16)
AST SERPL-CCNC: 14 U/L (ref 10–40)
BILIRUB SERPL-MCNC: 0.4 MG/DL (ref 0.1–1)
BUN SERPL-MCNC: 15 MG/DL (ref 8–23)
CALCIUM SERPL-MCNC: 9.2 MG/DL (ref 8.7–10.5)
CHLORIDE SERPL-SCNC: 107 MMOL/L (ref 95–110)
CO2 SERPL-SCNC: 24 MMOL/L (ref 23–29)
CREAT SERPL-MCNC: 0.8 MG/DL (ref 0.5–1.4)
EST. GFR  (AFRICAN AMERICAN): >60 ML/MIN/1.73 M^2
EST. GFR  (NON AFRICAN AMERICAN): >60 ML/MIN/1.73 M^2
GLUCOSE SERPL-MCNC: 155 MG/DL (ref 70–110)
POTASSIUM SERPL-SCNC: 4.1 MMOL/L (ref 3.5–5.1)
PROT SERPL-MCNC: 7.5 G/DL (ref 6–8.4)
SODIUM SERPL-SCNC: 143 MMOL/L (ref 136–145)

## 2022-05-26 PROCEDURE — 36415 COLL VENOUS BLD VENIPUNCTURE: CPT | Performed by: FAMILY MEDICINE

## 2022-05-26 PROCEDURE — 80053 COMPREHEN METABOLIC PANEL: CPT | Performed by: FAMILY MEDICINE

## 2022-06-06 ENCOUNTER — HOSPITAL ENCOUNTER (OUTPATIENT)
Dept: RADIOLOGY | Facility: HOSPITAL | Age: 67
Discharge: HOME | End: 2022-06-06
Attending: FAMILY MEDICINE
Payer: MEDICARE

## 2022-06-06 DIAGNOSIS — Z12.31 ENCOUNTER FOR SCREENING MAMMOGRAM FOR MALIGNANT NEOPLASM OF BREAST: ICD-10-CM

## 2022-06-06 PROCEDURE — 77067 SCR MAMMO BI INCL CAD: CPT | Mod: TC

## 2022-06-06 PROCEDURE — 77067 SCR MAMMO BI INCL CAD: CPT | Mod: 26,,, | Performed by: RADIOLOGY

## 2022-06-06 PROCEDURE — 77063 MAMMO DIGITAL SCREENING BILAT WITH TOMO: ICD-10-PCS | Mod: 26,,, | Performed by: RADIOLOGY

## 2022-06-06 PROCEDURE — 77067 MAMMO DIGITAL SCREENING BILAT WITH TOMO: ICD-10-PCS | Mod: 26,,, | Performed by: RADIOLOGY

## 2022-06-06 PROCEDURE — 77063 BREAST TOMOSYNTHESIS BI: CPT | Mod: 26,,, | Performed by: RADIOLOGY

## 2022-07-08 ENCOUNTER — HOSPITAL ENCOUNTER (EMERGENCY)
Facility: HOSPITAL | Age: 67
Discharge: ANOTHER HOSPITAL | End: 2022-07-09
Payer: MEDICARE

## 2022-07-08 DIAGNOSIS — I10 HYPERTENSION, UNSPECIFIED TYPE: Primary | ICD-10-CM

## 2022-07-08 DIAGNOSIS — R07.9 CHEST PAIN: ICD-10-CM

## 2022-07-08 LAB
ALBUMIN SERPL BCP-MCNC: 3.4 G/DL (ref 3.5–5.2)
ALP SERPL-CCNC: 45 U/L (ref 55–135)
ALT SERPL W/O P-5'-P-CCNC: 18 U/L (ref 10–44)
ANION GAP SERPL CALC-SCNC: 11 MMOL/L (ref 8–16)
AST SERPL-CCNC: 19 U/L (ref 10–40)
BASOPHILS # BLD AUTO: 0.05 K/UL (ref 0–0.2)
BASOPHILS NFR BLD: 0.5 % (ref 0–1.9)
BILIRUB SERPL-MCNC: 0.5 MG/DL (ref 0.1–1)
BNP SERPL-MCNC: 63 PG/ML (ref 0–99)
BUN SERPL-MCNC: 16 MG/DL (ref 8–23)
CALCIUM SERPL-MCNC: 8.6 MG/DL (ref 8.7–10.5)
CHLORIDE SERPL-SCNC: 105 MMOL/L (ref 95–110)
CO2 SERPL-SCNC: 24 MMOL/L (ref 23–29)
CREAT SERPL-MCNC: 0.9 MG/DL (ref 0.5–1.4)
DIFFERENTIAL METHOD: ABNORMAL
EOSINOPHIL # BLD AUTO: 0.4 K/UL (ref 0–0.5)
EOSINOPHIL NFR BLD: 4.1 % (ref 0–8)
ERYTHROCYTE [DISTWIDTH] IN BLOOD BY AUTOMATED COUNT: 13.6 % (ref 11.5–14.5)
EST. GFR  (AFRICAN AMERICAN): >60 ML/MIN/1.73 M^2
EST. GFR  (NON AFRICAN AMERICAN): >60 ML/MIN/1.73 M^2
GLUCOSE SERPL-MCNC: 185 MG/DL (ref 70–110)
HCT VFR BLD AUTO: 32.5 % (ref 37–48.5)
HGB BLD-MCNC: 10.7 G/DL (ref 12–16)
IMM GRANULOCYTES # BLD AUTO: 0.02 K/UL (ref 0–0.04)
IMM GRANULOCYTES NFR BLD AUTO: 0.2 % (ref 0–0.5)
INR PPP: 1 (ref 0.8–1.2)
LYMPHOCYTES # BLD AUTO: 4.1 K/UL (ref 1–4.8)
LYMPHOCYTES NFR BLD: 42.3 % (ref 18–48)
MCH RBC QN AUTO: 29.7 PG (ref 27–31)
MCHC RBC AUTO-ENTMCNC: 32.9 G/DL (ref 32–36)
MCV RBC AUTO: 90 FL (ref 82–98)
MONOCYTES # BLD AUTO: 0.9 K/UL (ref 0.3–1)
MONOCYTES NFR BLD: 8.8 % (ref 4–15)
NEUTROPHILS # BLD AUTO: 4.3 K/UL (ref 1.8–7.7)
NEUTROPHILS NFR BLD: 44.1 % (ref 38–73)
NRBC BLD-RTO: 0 /100 WBC
PLATELET # BLD AUTO: 249 K/UL (ref 150–450)
PMV BLD AUTO: 10 FL (ref 9.2–12.9)
POTASSIUM SERPL-SCNC: 4.4 MMOL/L (ref 3.5–5.1)
PROT SERPL-MCNC: 6.8 G/DL (ref 6–8.4)
PROTHROMBIN TIME: 10.9 SEC (ref 9–12.5)
RBC # BLD AUTO: 3.6 M/UL (ref 4–5.4)
SARS-COV-2 RDRP RESP QL NAA+PROBE: NEGATIVE
SODIUM SERPL-SCNC: 140 MMOL/L (ref 136–145)
TROPONIN I SERPL DL<=0.01 NG/ML-MCNC: 0.01 NG/ML (ref 0–0.03)
WBC # BLD AUTO: 9.71 K/UL (ref 3.9–12.7)

## 2022-07-08 PROCEDURE — 85025 COMPLETE CBC W/AUTO DIFF WBC: CPT | Performed by: PHYSICIAN ASSISTANT

## 2022-07-08 PROCEDURE — 71045 X-RAY EXAM CHEST 1 VIEW: CPT | Mod: TC

## 2022-07-08 PROCEDURE — 25000003 PHARM REV CODE 250: Performed by: PHYSICIAN ASSISTANT

## 2022-07-08 PROCEDURE — U0002 COVID-19 LAB TEST NON-CDC: HCPCS | Performed by: PHYSICIAN ASSISTANT

## 2022-07-08 PROCEDURE — 83880 ASSAY OF NATRIURETIC PEPTIDE: CPT | Performed by: PHYSICIAN ASSISTANT

## 2022-07-08 PROCEDURE — 80053 COMPREHEN METABOLIC PANEL: CPT | Performed by: PHYSICIAN ASSISTANT

## 2022-07-08 PROCEDURE — 71045 XR CHEST AP PORTABLE: ICD-10-PCS | Mod: 26,,, | Performed by: RADIOLOGY

## 2022-07-08 PROCEDURE — 93010 ELECTROCARDIOGRAM REPORT: CPT | Mod: ,,, | Performed by: INTERNAL MEDICINE

## 2022-07-08 PROCEDURE — 71045 X-RAY EXAM CHEST 1 VIEW: CPT | Mod: 26,,, | Performed by: RADIOLOGY

## 2022-07-08 PROCEDURE — 85610 PROTHROMBIN TIME: CPT | Performed by: PHYSICIAN ASSISTANT

## 2022-07-08 PROCEDURE — 36415 COLL VENOUS BLD VENIPUNCTURE: CPT | Performed by: PHYSICIAN ASSISTANT

## 2022-07-08 PROCEDURE — 93010 EKG 12-LEAD: ICD-10-PCS | Mod: ,,, | Performed by: INTERNAL MEDICINE

## 2022-07-08 PROCEDURE — 99285 EMERGENCY DEPT VISIT HI MDM: CPT | Mod: 25

## 2022-07-08 PROCEDURE — 84484 ASSAY OF TROPONIN QUANT: CPT | Performed by: PHYSICIAN ASSISTANT

## 2022-07-08 PROCEDURE — 93005 ELECTROCARDIOGRAM TRACING: CPT

## 2022-07-08 RX ORDER — NAPROXEN SODIUM 220 MG/1
324 TABLET, FILM COATED ORAL
Status: COMPLETED | OUTPATIENT
Start: 2022-07-08 | End: 2022-07-08

## 2022-07-08 RX ORDER — DIAZEPAM 5 MG/1
5 TABLET ORAL
Status: COMPLETED | OUTPATIENT
Start: 2022-07-08 | End: 2022-07-08

## 2022-07-08 RX ORDER — CARVEDILOL 12.5 MG/1
25 TABLET ORAL 2 TIMES DAILY
Status: DISCONTINUED | OUTPATIENT
Start: 2022-07-08 | End: 2022-07-08

## 2022-07-08 RX ORDER — METOPROLOL TARTRATE 25 MG/1
50 TABLET, FILM COATED ORAL
Status: COMPLETED | OUTPATIENT
Start: 2022-07-08 | End: 2022-07-08

## 2022-07-08 RX ADMIN — DIAZEPAM 5 MG: 5 TABLET ORAL at 09:07

## 2022-07-08 RX ADMIN — ASPIRIN 324 MG: 81 TABLET, CHEWABLE ORAL at 05:07

## 2022-07-08 RX ADMIN — METOPROLOL TARTRATE 50 MG: 25 TABLET, FILM COATED ORAL at 09:07

## 2022-07-08 NOTE — ED NOTES
Pt states that for the last days she has intermittent pain in her right chest wall that is a sharp stabbing pain pt states that the pain last about 5 seconds then goes away. Pt states that the pain only occurs at night and she finds that the pain gets worse when she moves. Pt states that she is also having generalized abd pain with intermittent nausea vomiting and diarrhea. Pt states that she has also had a dry cough. Pt denies any other symptoms at this time. Pt provided with call light

## 2022-07-08 NOTE — ED PROVIDER NOTES
Please note that my documentation in this Electronic Healthcare Record was produced using speech recognition software and therefore may contain errors related to that software.These could include grammar, punctuation and spelling errors or the inclusion/ exclusion of phrases that were not intended. Please contact myself for any clarification, questions or concerns.    HPI: Patient is a 66 y.o. female who presents with the chief complaint of intermittent chest pain with intermittent shortness of breath with exertion X 3 days.  Comes and goes.  Worse at night and with leaning forward.  Recently recovered from a GI issue in which she was experiencing vomiting, abdominal pain, diarrhea.  Denies any visual changes, extremity weakness or paresthesias.  Has a mild cough.  Follows with Dr. Pryor's.  Was scheduled for stress test in a few weeks.  Took a baby aspirin this morning.  History of CKD, COPD, diabetes, hypertension.  Denies any history of MI or stents.    REVIEW OF SYSTEMS - 10 systems were independently reviewed and are otherwise negative with the exception of those items previously documented in the HPI and nursing notes.    Allergy: Iodine and iodide containing products    Past medical history:   Past Medical History:   Diagnosis Date    CKD (chronic kidney disease)     COPD (chronic obstructive pulmonary disease)     Diabetes mellitus     Hypercholesteremia     Hypertension        Surgical History:   Past Surgical History:   Procedure Laterality Date    FRACTURE SURGERY      pelvis surgery    SHOULDER SURGERY Left     TUBAL LIGATION         Social history:   Social History     Socioeconomic History    Marital status:    Tobacco Use    Smoking status: Never Smoker    Smokeless tobacco: Never Used   Substance and Sexual Activity    Alcohol use: No    Drug use: No       Family history: non-contributory    EHR: reviewed    Vitals: BP (!) 186/73 (BP Location: Right arm, Patient Position:  "Sitting)   Pulse (!) 59   Temp 98.2 °F (36.8 °C) (Oral)   Resp 20   Ht 5' 3" (1.6 m)   Wt 95.3 kg (210 lb)   SpO2 95%   BMI 37.20 kg/m²     PHYSICAL EXAM:    General-66-year-old female awake and alert, oriented, GCS 15, in no acute distress,  HEENT- normocephalic, atraumatic, sclera anicteric, moist mucous membranes, PERRL, EOMI  CARDIOVASCULAR- regular rate and rhythm, no murmurs/rubs,/gallops, normal S1-S2  PULMONARY- nonlabored, no respiratory distress, clear to auscultation bilaterally, no wheezes/rhonchi/rales, chest expansion symmetrical  NEUROLOGIC- mental status normal, speech fluid, cognition normal, CN II-XII grossly intact, sensations equal normal bilateral upper and lower extremities, peripheral pulse 2 +/4, ambulatory with proper gait.  MUSCULOSKELETAL- well-nourished, well-developed, no extremity edema  DERMATOLOGIC- warm and dry, no visible rashes  PSYCHIATRIC- normal affect, normal concentration           Labs Reviewed   CBC W/ AUTO DIFFERENTIAL - Abnormal; Notable for the following components:       Result Value    RBC 3.60 (*)     Hemoglobin 10.7 (*)     Hematocrit 32.5 (*)     All other components within normal limits   COMPREHENSIVE METABOLIC PANEL - Abnormal; Notable for the following components:    Glucose 185 (*)     Calcium 8.6 (*)     Albumin 3.4 (*)     Alkaline Phosphatase 45 (*)     All other components within normal limits   B-TYPE NATRIURETIC PEPTIDE   TROPONIN I   PROTIME-INR   SARS-COV-2 RNA AMPLIFICATION, QUAL    Narrative:     Is the patient symptomatic?->Yes       X-Ray Chest AP Portable    (Results Pending)   EKG 08/08/2020 02/17/2033.  Interpreted by myself as junctional rhythm.  64 beats per minute.  QTC normal.  Left axis deviation.  No ST segment changes or T-wave inversions noted.  This is a change from patient's prior EKG.    MEDICAL DECISION MAKING: Patient is a 66 y.o. female who presented with chief complaint of chest pain and mild shortness of breath.  Denies " nausea vomiting, abdominal pain, cough, congestion.  Symptoms have been intermittent for the last couple of days.  Patient was having some pain upon arrival.  Given full-dose aspirin which resolved her symptoms.  Cardiac and pulmonary exam is unremarkable today.  No extremity edema.  Differentials considered, ACS, PE, pneumonia, GERD, etc.  hemoglobin 10.8, Chem panel the calcium of 8.6 and a glucose of 185. BNP and troponin are unremarkable.  COVID negative.  X-ray reviewed by myself does not show any obvious infiltrate or effusion.  Patient was given normal nightly dose of metoprolol for her blood pressure.  Was given a Valium for her anxiety.  I did contact cardiologist Dr. Aldridge and he would like her transfer to [facility] for cardiac workup given her symptoms and abnormal EKG.  Patient accepted by Dr. Salinas and Dr. Aldana.     CLINICAL IMPRESSION:  1. Hypertension, unspecified type    2. Chest pain         MIGUEL ANGEL Arechiga  07/08/22 4580

## 2022-07-09 VITALS
SYSTOLIC BLOOD PRESSURE: 152 MMHG | RESPIRATION RATE: 18 BRPM | HEART RATE: 54 BPM | HEIGHT: 63 IN | TEMPERATURE: 98 F | DIASTOLIC BLOOD PRESSURE: 64 MMHG | WEIGHT: 210 LBS | BODY MASS INDEX: 37.21 KG/M2 | OXYGEN SATURATION: 96 %

## 2022-07-26 ENCOUNTER — OFFICE VISIT (OUTPATIENT)
Dept: PODIATRY | Facility: CLINIC | Age: 67
End: 2022-07-26
Payer: MEDICARE

## 2022-07-26 VITALS
HEIGHT: 63 IN | OXYGEN SATURATION: 95 % | BODY MASS INDEX: 37.21 KG/M2 | DIASTOLIC BLOOD PRESSURE: 60 MMHG | SYSTOLIC BLOOD PRESSURE: 109 MMHG | HEART RATE: 66 BPM | WEIGHT: 210 LBS

## 2022-07-26 DIAGNOSIS — G57.93 NEUROPATHIC PAIN OF BOTH FEET: Primary | ICD-10-CM

## 2022-07-26 DIAGNOSIS — R60.0 PEDAL EDEMA: ICD-10-CM

## 2022-07-26 DIAGNOSIS — R25.2 FOOT CRAMPS: ICD-10-CM

## 2022-07-26 DIAGNOSIS — L85.3 DRY SKIN: ICD-10-CM

## 2022-07-26 PROCEDURE — 1101F PR PT FALLS ASSESS DOC 0-1 FALLS W/OUT INJ PAST YR: ICD-10-PCS | Mod: CPTII,S$GLB,, | Performed by: PODIATRIST

## 2022-07-26 PROCEDURE — 1126F AMNT PAIN NOTED NONE PRSNT: CPT | Mod: CPTII,S$GLB,, | Performed by: PODIATRIST

## 2022-07-26 PROCEDURE — 3074F PR MOST RECENT SYSTOLIC BLOOD PRESSURE < 130 MM HG: ICD-10-PCS | Mod: CPTII,S$GLB,, | Performed by: PODIATRIST

## 2022-07-26 PROCEDURE — 3288F PR FALLS RISK ASSESSMENT DOCUMENTED: ICD-10-PCS | Mod: CPTII,S$GLB,, | Performed by: PODIATRIST

## 2022-07-26 PROCEDURE — 99999 PR PBB SHADOW E&M-EST. PATIENT-LVL V: CPT | Mod: PBBFAC,,, | Performed by: PODIATRIST

## 2022-07-26 PROCEDURE — 1160F RVW MEDS BY RX/DR IN RCRD: CPT | Mod: CPTII,S$GLB,, | Performed by: PODIATRIST

## 2022-07-26 PROCEDURE — 3074F SYST BP LT 130 MM HG: CPT | Mod: CPTII,S$GLB,, | Performed by: PODIATRIST

## 2022-07-26 PROCEDURE — 3078F PR MOST RECENT DIASTOLIC BLOOD PRESSURE < 80 MM HG: ICD-10-PCS | Mod: CPTII,S$GLB,, | Performed by: PODIATRIST

## 2022-07-26 PROCEDURE — 4010F PR ACE/ARB THEARPY RXD/TAKEN: ICD-10-PCS | Mod: CPTII,S$GLB,, | Performed by: PODIATRIST

## 2022-07-26 PROCEDURE — 3008F PR BODY MASS INDEX (BMI) DOCUMENTED: ICD-10-PCS | Mod: CPTII,S$GLB,, | Performed by: PODIATRIST

## 2022-07-26 PROCEDURE — 99999 PR PBB SHADOW E&M-EST. PATIENT-LVL V: ICD-10-PCS | Mod: PBBFAC,,, | Performed by: PODIATRIST

## 2022-07-26 PROCEDURE — 1126F PR PAIN SEVERITY QUANTIFIED, NO PAIN PRESENT: ICD-10-PCS | Mod: CPTII,S$GLB,, | Performed by: PODIATRIST

## 2022-07-26 PROCEDURE — 3078F DIAST BP <80 MM HG: CPT | Mod: CPTII,S$GLB,, | Performed by: PODIATRIST

## 2022-07-26 PROCEDURE — 1159F PR MEDICATION LIST DOCUMENTED IN MEDICAL RECORD: ICD-10-PCS | Mod: CPTII,S$GLB,, | Performed by: PODIATRIST

## 2022-07-26 PROCEDURE — 99214 OFFICE O/P EST MOD 30 MIN: CPT | Mod: S$GLB,,, | Performed by: PODIATRIST

## 2022-07-26 PROCEDURE — 3008F BODY MASS INDEX DOCD: CPT | Mod: CPTII,S$GLB,, | Performed by: PODIATRIST

## 2022-07-26 PROCEDURE — 4010F ACE/ARB THERAPY RXD/TAKEN: CPT | Mod: CPTII,S$GLB,, | Performed by: PODIATRIST

## 2022-07-26 PROCEDURE — 99214 PR OFFICE/OUTPT VISIT, EST, LEVL IV, 30-39 MIN: ICD-10-PCS | Mod: S$GLB,,, | Performed by: PODIATRIST

## 2022-07-26 PROCEDURE — 1101F PT FALLS ASSESS-DOCD LE1/YR: CPT | Mod: CPTII,S$GLB,, | Performed by: PODIATRIST

## 2022-07-26 PROCEDURE — 3288F FALL RISK ASSESSMENT DOCD: CPT | Mod: CPTII,S$GLB,, | Performed by: PODIATRIST

## 2022-07-26 PROCEDURE — 1159F MED LIST DOCD IN RCRD: CPT | Mod: CPTII,S$GLB,, | Performed by: PODIATRIST

## 2022-07-26 PROCEDURE — 1160F PR REVIEW ALL MEDS BY PRESCRIBER/CLIN PHARMACIST DOCUMENTED: ICD-10-PCS | Mod: CPTII,S$GLB,, | Performed by: PODIATRIST

## 2022-07-26 RX ORDER — CYCLOBENZAPRINE HCL 10 MG
10 TABLET ORAL NIGHTLY
Qty: 30 TABLET | Refills: 2 | Status: SHIPPED | OUTPATIENT
Start: 2022-07-26 | End: 2022-10-25 | Stop reason: SDUPTHER

## 2022-07-26 RX ORDER — HYDROCODONE BITARTRATE AND ACETAMINOPHEN 7.5; 325 MG/1; MG/1
1 TABLET ORAL EVERY 6 HOURS PRN
Qty: 28 TABLET | Refills: 0 | Status: SHIPPED | OUTPATIENT
Start: 2022-07-26 | End: 2022-08-02

## 2022-07-30 NOTE — PROGRESS NOTES
Subjective:       Patient ID: Syl Matos is a 67 y.o. female.    Chief Complaint: Follow-up, Diabetes Mellitus, and Foot Pain  Patient presents for follow-up diabetes, neuropathic pain in feet, foot cramps, medication refills.  Patient relates muscle relaxer at bedtime has helped significantly.  She continues to take Lyrica 75 mg twice daily which helps majority of the time.  There are times, especially at night cramping, burning, tingling and numbness is severe.  Several times over the last 3 months pain level has been 8-10 over 10 at night, difficulty sleeping, this will occur few nights in a row and then improved.  Relates no change in diabetes, A1c usually 8 range    Past Medical History:   Diagnosis Date    Atrial fibrillation     CKD (chronic kidney disease)     COPD (chronic obstructive pulmonary disease)     Diabetes mellitus     Hypercholesteremia     Hypertension      Past Surgical History:   Procedure Laterality Date    FRACTURE SURGERY      pelvis surgery    SHOULDER SURGERY Left     TUBAL LIGATION       Family History   Problem Relation Age of Onset    Heart disease Mother     Breast cancer Mother     Heart disease Father      Social History     Socioeconomic History    Marital status:    Tobacco Use    Smoking status: Never Smoker    Smokeless tobacco: Never Used   Substance and Sexual Activity    Alcohol use: No    Drug use: No       Current Outpatient Medications   Medication Sig Dispense Refill    ACCU-CHEK GUIDE GLUCOSE METER Misc 3 (three) times daily. Use to test blood glucose      ACCU-CHEK GUIDE TEST STRIPS Strp 3 (three) times daily. Use to test blood glucose      albuterol (PROVENTIL/VENTOLIN HFA) 90 mcg/actuation inhaler Inhale 1-2 puffs into the lungs every 6 (six) hours as needed for Wheezing or Shortness of Breath. Rescue 18 g 2    aspirin 81 MG Chew Take 81 mg by mouth once daily.      busPIRone (BUSPAR) 15 MG tablet Take 15 mg by mouth once daily.     "   carvediloL (COREG) 25 MG tablet Take 1 tablet (25 mg total) by mouth 2 (two) times daily with meals. 60 tablet 11    cholecalciferol, vitamin D3, (VITAMIN D3) 50 mcg (2,000 unit) Tab Take 1 tablet (2,000 Units total) by mouth once daily.      EScitalopram oxalate (LEXAPRO) 20 MG tablet Take 20 mg by mouth every morning.      fluticasone-salmeterol diskus inhaler 250-50 mcg Inhale 1 puff into the lungs 2 (two) times daily. Controller 60 each 2    furosemide (LASIX) 20 MG tablet Take 20 mg by mouth 2 (two) times daily.      glimepiride (AMARYL) 4 MG tablet Take 4 mg by mouth.      insulin (LANTUS SOLOSTAR U-100 INSULIN) glargine 100 units/mL (3mL) SubQ pen Inject 60 Units into the skin once daily.      lisinopriL (PRINIVIL,ZESTRIL) 5 MG tablet Take 5 mg by mouth once daily.      loratadine (CLARITIN) 10 mg tablet   0    metFORMIN (FORTAMET) 1,000 mg 24hr tablet Take 1,000 mg by mouth daily with breakfast.      metoprolol tartrate (LOPRESSOR) 50 MG tablet Take 50 mg by mouth 2 (two) times daily.      MICROLET LANCET Misc USE TO TEST DAILY      NOVOFINE 32 32 x 1/4 " Ndle   2    oxybutynin (DITROPAN) 5 MG Tab TK 1 T PO BID      pravastatin (PRAVACHOL) 20 MG tablet Take 20 mg by mouth once daily.      pregabalin (LYRICA) 75 MG capsule Take 75 mg by mouth 2 (two) times daily.      simvastatin (ZOCOR) 40 MG tablet TAKE 1 TABLET BY MOUTH EVERY MORNING. 90 tablet 2    SITagliptin (JANUVIA) 100 MG Tab Take 100 mg by mouth once daily.      traZODone (DESYREL) 50 MG tablet Take 50 mg by mouth nightly.      triamterene-hydrochlorothiazide 37.5-25 mg (DYAZIDE) 37.5-25 mg per capsule Take 1 capsule by mouth every morning.      zolpidem (AMBIEN) 10 mg Tab Take 5 mg by mouth nightly as needed.      amLODIPine (NORVASC) 10 MG tablet Take 1 tablet (10 mg total) by mouth once daily. 30 tablet 11    cyclobenzaprine (FLEXERIL) 10 MG tablet Take 1 tablet (10 mg total) by mouth every evening. 30 tablet 2    " "HYDROcodone-acetaminophen (NORCO) 7.5-325 mg per tablet Take 1 tablet by mouth every 6 (six) hours as needed for Pain. 28 tablet 0    insulin lispro 100 unit/mL injection Inject 14 Units into the skin 3 (three) times daily with meals. 12.6 mL 11    omega-3 fatty acids 1,000 mg Cap Take 2 capsules (2,000 mg total) by mouth 2 (two) times daily. Take as directed for triglycerides and to decrease risk of heart disease and stroke.  0    traMADoL (ULTRAM) 50 mg tablet Take 2 tablets (100 mg total) by mouth every 8 (eight) hours as needed for Pain. 18 tablet 0     No current facility-administered medications for this visit.     Review of patient's allergies indicates:   Allergen Reactions    Iodine and iodide containing products Hives       Review of Systems   HENT: Negative for congestion.    Respiratory: Negative for cough and shortness of breath.    Cardiovascular: Positive for leg swelling.        Mild   Endocrine:        DM 2 x 25 yrs   All other systems reviewed and are negative.      Objective:      Vitals:    07/26/22 1054   BP: 109/60   Pulse: 66   SpO2: 95%   Weight: 95.3 kg (210 lb)   Height: 5' 3" (1.6 m)     Physical Exam  Vitals and nursing note reviewed.   Constitutional:       General: She is not in acute distress.  Cardiovascular:      Pulses:           Dorsalis pedis pulses are 2+ on the right side and 2+ on the left side.        Posterior tibial pulses are 1+ on the right side and 1+ on the left side.   Pulmonary:      Effort: Pulmonary effort is normal.   Musculoskeletal:         General: No deformity. Normal range of motion.      Right foot: Normal range of motion. No deformity.      Left foot: Normal range of motion. No deformity.   Feet:      Right foot:      Skin integrity: Dry skin present.      Toenail Condition: Right toenails are long.      Left foot:      Skin integrity: Dry skin present.      Toenail Condition: Left toenails are long.   Skin:     Capillary Refill: Capillary refill takes 2 " to 3 seconds.   Neurological:      Sensory: Sensory deficit present.      Comments: Lack of sensation distal digits with monofilament testing bilateral feet.  Painful paresthesias foot cramps bilateral, history diabetic neuropathic foot pain   Psychiatric:         Mood and Affect: Mood normal.         Behavior: Behavior normal.                                        Assessment:       1. Neuropathic pain of both feet    2. DM type 2, uncontrolled, with neuropathy    3. Foot cramps    4. Pedal edema    5. Dry skin        Plan:         FLEXERIL 10 MG ONE AT BEDTIME  HYDROCODONE 7.5 MG Q.6H P.R.N. NEUROPATHIC PAIN FEET      Reviewed diabetes, uncontrolled, how diabetes and daily elevated glucose contributes to neuropathic pain in feet.    Reviewed with patient benefit of better control of her diabetes which can significantly help her foot pain.    Reviewed Lyrica 75 mg twice daily advised patient due to her age potential side effects would recommend she continue this does and increase only if needed  Continue 10 mg Flexeril 1 at bedtime  We reviewed hydrocodone 1 at bedtime, p.r.n. foot pain as needed.  Prescribed medication Q 6 H and advised patient medication should only be taken absolutely necessary, patient understands this medication cannot be refilled on a regular basis  Reviewed swelling noted in feet legs today, elevation, monitor closely, swelling needs to be resolved in 2 weeks, if any progression contact her PCP  Advised with neuropathy it is crucial she wear appropriate shoe indoors, no flat shoes or walking barefoot  Reviewed wide light appropriate shoes,  especially indoors to protect feet  Discussed maintenance of dry skin heels  Reviewed care and maintenance of and nails, nails debrided   Reviewed need for daily foot checks and instructed patient to contact the office with any area of redness or swelling which has not improved within 3 days.  Patient was in understanding and agreement with treatment  plan.  I counseled the patient on their conditions, implications and medical management.  Instructed patient to contact the office with any changes, questions, concerns, worsening of symptoms.   Total face-to-face time, exam, assessment, treatment, discussion, documentation 30 minutes, more than half this time spent on consultation and coordination of care.  Follow up prn 3 months      This note was created using [employer] voice recognition software that occasionally misinterpreted phrases or words.

## 2022-08-31 ENCOUNTER — TELEPHONE (OUTPATIENT)
Dept: PODIATRY | Facility: CLINIC | Age: 67
End: 2022-08-31
Payer: MEDICARE

## 2022-08-31 ENCOUNTER — LAB VISIT (OUTPATIENT)
Dept: LAB | Facility: HOSPITAL | Age: 67
End: 2022-08-31
Attending: FAMILY MEDICINE
Payer: MEDICARE

## 2022-08-31 DIAGNOSIS — I10 ESSENTIAL HYPERTENSION, MALIGNANT: ICD-10-CM

## 2022-08-31 DIAGNOSIS — N39.0 URINARY TRACT INFECTION, SITE NOT SPECIFIED: ICD-10-CM

## 2022-08-31 DIAGNOSIS — I48.0 PAROXYSMAL ATRIAL FIBRILLATION: ICD-10-CM

## 2022-08-31 DIAGNOSIS — E11.9 DIABETES MELLITUS WITHOUT COMPLICATION: Primary | ICD-10-CM

## 2022-08-31 LAB
BILIRUB UR QL STRIP: NEGATIVE
CLARITY UR: CLEAR
COLOR UR: YELLOW
GLUCOSE UR QL STRIP: NEGATIVE
HGB UR QL STRIP: NEGATIVE
KETONES UR QL STRIP: NEGATIVE
LEUKOCYTE ESTERASE UR QL STRIP: ABNORMAL
MICROSCOPIC COMMENT: ABNORMAL
NITRITE UR QL STRIP: NEGATIVE
PH UR STRIP: 6 [PH] (ref 5–8)
PROT UR QL STRIP: NEGATIVE
SP GR UR STRIP: 1.02 (ref 1–1.03)
URN SPEC COLLECT METH UR: ABNORMAL
UROBILINOGEN UR STRIP-ACNC: NEGATIVE EU/DL
WBC #/AREA URNS HPF: 6 /HPF (ref 0–5)

## 2022-08-31 PROCEDURE — 87086 URINE CULTURE/COLONY COUNT: CPT | Performed by: FAMILY MEDICINE

## 2022-08-31 PROCEDURE — 81000 URINALYSIS NONAUTO W/SCOPE: CPT | Performed by: FAMILY MEDICINE

## 2022-08-31 PROCEDURE — 87186 SC STD MICRODIL/AGAR DIL: CPT | Performed by: FAMILY MEDICINE

## 2022-08-31 PROCEDURE — 87088 URINE BACTERIA CULTURE: CPT | Performed by: FAMILY MEDICINE

## 2022-08-31 PROCEDURE — 87077 CULTURE AEROBIC IDENTIFY: CPT | Performed by: FAMILY MEDICINE

## 2022-08-31 NOTE — TELEPHONE ENCOUNTER
Patient requested appointment on 9/8 when her daughter had an appointment. No appointment available. Patient states that she is having heel pain. Able to schedule an appointment for another day that was agreeable to patient.

## 2022-09-02 ENCOUNTER — OFFICE VISIT (OUTPATIENT)
Dept: PODIATRY | Facility: CLINIC | Age: 67
End: 2022-09-02
Payer: MEDICARE

## 2022-09-02 VITALS
BODY MASS INDEX: 36.92 KG/M2 | SYSTOLIC BLOOD PRESSURE: 106 MMHG | DIASTOLIC BLOOD PRESSURE: 67 MMHG | HEIGHT: 63 IN | HEART RATE: 60 BPM | WEIGHT: 208.38 LBS

## 2022-09-02 DIAGNOSIS — L85.3 DRY SKIN: ICD-10-CM

## 2022-09-02 DIAGNOSIS — R23.4 FISSURE IN SKIN OF FOOT: Primary | ICD-10-CM

## 2022-09-02 DIAGNOSIS — G57.93 NEUROPATHIC PAIN OF BOTH FEET: ICD-10-CM

## 2022-09-02 PROCEDURE — 1159F MED LIST DOCD IN RCRD: CPT | Mod: CPTII,S$GLB,, | Performed by: PODIATRIST

## 2022-09-02 PROCEDURE — 1160F RVW MEDS BY RX/DR IN RCRD: CPT | Mod: CPTII,S$GLB,, | Performed by: PODIATRIST

## 2022-09-02 PROCEDURE — 99214 OFFICE O/P EST MOD 30 MIN: CPT | Mod: S$GLB,,, | Performed by: PODIATRIST

## 2022-09-02 PROCEDURE — 3078F PR MOST RECENT DIASTOLIC BLOOD PRESSURE < 80 MM HG: ICD-10-PCS | Mod: CPTII,S$GLB,, | Performed by: PODIATRIST

## 2022-09-02 PROCEDURE — 99999 PR PBB SHADOW E&M-EST. PATIENT-LVL V: CPT | Mod: PBBFAC,,, | Performed by: PODIATRIST

## 2022-09-02 PROCEDURE — 3288F PR FALLS RISK ASSESSMENT DOCUMENTED: ICD-10-PCS | Mod: CPTII,S$GLB,, | Performed by: PODIATRIST

## 2022-09-02 PROCEDURE — 99999 PR PBB SHADOW E&M-EST. PATIENT-LVL V: ICD-10-PCS | Mod: PBBFAC,,, | Performed by: PODIATRIST

## 2022-09-02 PROCEDURE — 4010F ACE/ARB THERAPY RXD/TAKEN: CPT | Mod: CPTII,S$GLB,, | Performed by: PODIATRIST

## 2022-09-02 PROCEDURE — 1101F PR PT FALLS ASSESS DOC 0-1 FALLS W/OUT INJ PAST YR: ICD-10-PCS | Mod: CPTII,S$GLB,, | Performed by: PODIATRIST

## 2022-09-02 PROCEDURE — 3008F BODY MASS INDEX DOCD: CPT | Mod: CPTII,S$GLB,, | Performed by: PODIATRIST

## 2022-09-02 PROCEDURE — 1125F PR PAIN SEVERITY QUANTIFIED, PAIN PRESENT: ICD-10-PCS | Mod: CPTII,S$GLB,, | Performed by: PODIATRIST

## 2022-09-02 PROCEDURE — 3074F PR MOST RECENT SYSTOLIC BLOOD PRESSURE < 130 MM HG: ICD-10-PCS | Mod: CPTII,S$GLB,, | Performed by: PODIATRIST

## 2022-09-02 PROCEDURE — 3008F PR BODY MASS INDEX (BMI) DOCUMENTED: ICD-10-PCS | Mod: CPTII,S$GLB,, | Performed by: PODIATRIST

## 2022-09-02 PROCEDURE — 99214 PR OFFICE/OUTPT VISIT, EST, LEVL IV, 30-39 MIN: ICD-10-PCS | Mod: S$GLB,,, | Performed by: PODIATRIST

## 2022-09-02 PROCEDURE — 3288F FALL RISK ASSESSMENT DOCD: CPT | Mod: CPTII,S$GLB,, | Performed by: PODIATRIST

## 2022-09-02 PROCEDURE — 3074F SYST BP LT 130 MM HG: CPT | Mod: CPTII,S$GLB,, | Performed by: PODIATRIST

## 2022-09-02 PROCEDURE — 3078F DIAST BP <80 MM HG: CPT | Mod: CPTII,S$GLB,, | Performed by: PODIATRIST

## 2022-09-02 PROCEDURE — 4010F PR ACE/ARB THEARPY RXD/TAKEN: ICD-10-PCS | Mod: CPTII,S$GLB,, | Performed by: PODIATRIST

## 2022-09-02 PROCEDURE — 1159F PR MEDICATION LIST DOCUMENTED IN MEDICAL RECORD: ICD-10-PCS | Mod: CPTII,S$GLB,, | Performed by: PODIATRIST

## 2022-09-02 PROCEDURE — 1101F PT FALLS ASSESS-DOCD LE1/YR: CPT | Mod: CPTII,S$GLB,, | Performed by: PODIATRIST

## 2022-09-02 PROCEDURE — 1160F PR REVIEW ALL MEDS BY PRESCRIBER/CLIN PHARMACIST DOCUMENTED: ICD-10-PCS | Mod: CPTII,S$GLB,, | Performed by: PODIATRIST

## 2022-09-02 PROCEDURE — 1125F AMNT PAIN NOTED PAIN PRSNT: CPT | Mod: CPTII,S$GLB,, | Performed by: PODIATRIST

## 2022-09-02 RX ORDER — INSULIN GLARGINE 100 [IU]/ML
INJECTION, SOLUTION SUBCUTANEOUS
COMMUNITY
Start: 2022-07-28 | End: 2022-09-10 | Stop reason: SDUPTHER

## 2022-09-02 RX ORDER — CIPROFLOXACIN 500 MG/1
500 TABLET ORAL 2 TIMES DAILY
COMMUNITY
Start: 2022-05-09 | End: 2022-10-25

## 2022-09-02 RX ORDER — CYCLOBENZAPRINE HCL 10 MG
10 TABLET ORAL
COMMUNITY
Start: 2022-07-06 | End: 2022-09-10 | Stop reason: SDUPTHER

## 2022-09-02 RX ORDER — LISINOPRIL 20 MG/1
20 TABLET ORAL
COMMUNITY
Start: 2022-07-11 | End: 2023-05-05

## 2022-09-02 RX ORDER — HYDROCHLOROTHIAZIDE 25 MG/1
25 TABLET ORAL
COMMUNITY
Start: 2022-07-11 | End: 2023-01-07

## 2022-09-02 RX ORDER — METOPROLOL TARTRATE 50 MG/1
50 TABLET ORAL
COMMUNITY
Start: 2022-07-06

## 2022-09-02 RX ORDER — METOPROLOL TARTRATE 25 MG/1
25 TABLET, FILM COATED ORAL
COMMUNITY
Start: 2022-07-11 | End: 2022-09-10 | Stop reason: ALTCHOICE

## 2022-09-02 RX ORDER — IPRATROPIUM BROMIDE AND ALBUTEROL SULFATE 2.5; .5 MG/3ML; MG/3ML
SOLUTION RESPIRATORY (INHALATION)
COMMUNITY
Start: 2022-07-28

## 2022-09-02 RX ORDER — ONDANSETRON 4 MG/1
TABLET, ORALLY DISINTEGRATING ORAL
COMMUNITY
Start: 2022-06-30

## 2022-09-02 RX ORDER — HYDROCHLOROTHIAZIDE 25 MG/1
25 TABLET ORAL DAILY
COMMUNITY
Start: 2022-07-11 | End: 2022-09-10 | Stop reason: SDUPTHER

## 2022-09-02 RX ORDER — METFORMIN HYDROCHLORIDE 1000 MG/1
1000 TABLET ORAL 2 TIMES DAILY
COMMUNITY
Start: 2022-07-29

## 2022-09-02 RX ORDER — AMLODIPINE BESYLATE 10 MG/1
10 TABLET ORAL
COMMUNITY
Start: 2022-07-06

## 2022-09-03 LAB — BACTERIA UR CULT: ABNORMAL

## 2022-09-10 NOTE — PROGRESS NOTES
Subjective:       Patient ID: Syl Matos is a 67 y.o. female.    Chief Complaint: Follow-up, Diabetes Mellitus, and Foot Pain (Neuropathy feet)  Patient presents with complaint of painful cracked skin left heel.  History of uncontrolled type 2 diabetes with neuropathic foot pain.  Confirms compliance with Lyrica 75 mg twice daily, Flexeril 10 mg 1 at bedtime        Past Medical History:   Diagnosis Date    Atrial fibrillation     CKD (chronic kidney disease)     COPD (chronic obstructive pulmonary disease)     Diabetes mellitus     Hypercholesteremia     Hypertension      Past Surgical History:   Procedure Laterality Date    FRACTURE SURGERY      pelvis surgery    SHOULDER SURGERY Left     TUBAL LIGATION       Family History   Problem Relation Age of Onset    Heart disease Mother     Breast cancer Mother     Heart disease Father      Social History     Socioeconomic History    Marital status:    Tobacco Use    Smoking status: Never    Smokeless tobacco: Never   Substance and Sexual Activity    Alcohol use: No    Drug use: No       Current Outpatient Medications   Medication Sig Dispense Refill    albuterol (PROVENTIL/VENTOLIN HFA) 90 mcg/actuation inhaler Inhale 1-2 puffs into the lungs every 6 (six) hours as needed for Wheezing or Shortness of Breath. Rescue 18 g 2    albuterol-ipratropium (DUO-NEB) 2.5 mg-0.5 mg/3 mL nebulizer solution USE 1 VIAL IN NEBULIZER THREE TIMES DAILY AS NEEDED      amLODIPine (NORVASC) 10 MG tablet 10 mg.      aspirin 81 MG Chew Take 81 mg by mouth once daily.      busPIRone (BUSPAR) 15 MG tablet Take 15 mg by mouth once daily.       carvediloL (COREG) 25 MG tablet Take 1 tablet (25 mg total) by mouth 2 (two) times daily with meals. 60 tablet 11    cholecalciferol, vitamin D3, (VITAMIN D3) 50 mcg (2,000 unit) Tab Take 1 tablet (2,000 Units total) by mouth once daily.      cyclobenzaprine (FLEXERIL) 10 MG tablet Take 1 tablet (10 mg total) by mouth every evening. 30 tablet 2     EScitalopram oxalate (LEXAPRO) 20 MG tablet Take 20 mg by mouth every morning.      fluticasone-salmeterol diskus inhaler 250-50 mcg Inhale 1 puff into the lungs 2 (two) times daily. Controller 60 each 2    furosemide (LASIX) 20 MG tablet Take 20 mg by mouth 2 (two) times daily.      glimepiride (AMARYL) 4 MG tablet Take 4 mg by mouth.      hydroCHLOROthiazide (HYDRODIURIL) 25 MG tablet 25 mg.      insulin (LANTUS SOLOSTAR U-100 INSULIN) glargine 100 units/mL (3mL) SubQ pen Inject 60 Units into the skin once daily.      lisinopriL (PRINIVIL,ZESTRIL) 20 MG tablet 20 mg.      loratadine (CLARITIN) 10 mg tablet   0    metFORMIN (GLUCOPHAGE) 1000 MG tablet Take 1,000 mg by mouth 2 (two) times daily.      metoprolol tartrate (LOPRESSOR) 50 MG tablet 50 mg.      pravastatin (PRAVACHOL) 20 MG tablet Take 20 mg by mouth once daily.      pregabalin (LYRICA) 75 MG capsule Take 75 mg by mouth 2 (two) times daily.      simvastatin (ZOCOR) 40 MG tablet TAKE 1 TABLET BY MOUTH EVERY MORNING. 90 tablet 2    SITagliptin (JANUVIA) 100 MG Tab Take 100 mg by mouth once daily.      traZODone (DESYREL) 50 MG tablet Take 50 mg by mouth nightly.      ACCU-CHEK GUIDE GLUCOSE METER Misc 3 (three) times daily. Use to test blood glucose      ACCU-CHEK GUIDE TEST STRIPS Strp 3 (three) times daily. Use to test blood glucose      ciprofloxacin HCl (CIPRO) 500 MG tablet Take 500 mg by mouth 2 (two) times daily.      insulin lispro 100 unit/mL injection Inject 14 Units into the skin 3 (three) times daily with meals. 12.6 mL 11    omega-3 fatty acids 1,000 mg Cap Take 2 capsules (2,000 mg total) by mouth 2 (two) times daily. Take as directed for triglycerides and to decrease risk of heart disease and stroke.  0    ondansetron (ZOFRAN-ODT) 4 MG TbDL DISSOLVE ONE TABLET BY MOUTH THREE TIMES DAILY AS NEEDED FORNAUSEA AND VOMITING      triamterene-hydrochlorothiazide 37.5-25 mg (DYAZIDE) 37.5-25 mg per capsule Take 1 capsule by mouth every morning.    "    No current facility-administered medications for this visit.     Review of patient's allergies indicates:   Allergen Reactions    Iodine and iodide containing products Hives       Review of Systems   HENT:  Negative for congestion.    Respiratory:  Negative for cough and shortness of breath.    Endocrine:        DM 2 x 25 yrs   Musculoskeletal:  Negative for gait problem.   All other systems reviewed and are negative.    Objective:      Vitals:    09/02/22 1041   BP: 106/67   Pulse: 60   Weight: 94.5 kg (208 lb 6.4 oz)   Height: 5' 3" (1.6 m)     Physical Exam  Vitals and nursing note reviewed.   Constitutional:       General: She is not in acute distress.  Cardiovascular:      Pulses:           Dorsalis pedis pulses are 2+ on the right side and 2+ on the left side.        Posterior tibial pulses are 1+ on the right side and 1+ on the left side.   Pulmonary:      Effort: Pulmonary effort is normal.   Musculoskeletal:         General: No deformity. Normal range of motion.      Right foot: Normal range of motion. No deformity.      Left foot: Normal range of motion. No deformity.   Feet:      Right foot:      Skin integrity: Callus and dry skin present.      Left foot:      Skin integrity: Callus (Calluses medial b/l hallux and mild sub 2nd met head right foot) and dry skin (Excessively dry skin bilateral plantar feet especially heels with small tender fissure left heel.  No complications) present.   Skin:     General: Skin is dry.      Capillary Refill: Capillary refill takes 2 to 3 seconds.   Neurological:      Sensory: Sensory deficit present.      Comments: Painful paresthesias, neuropathic foot pain   Psychiatric:         Mood and Affect: Mood normal.         Behavior: Behavior normal.                                Assessment:       1. Fissure in skin of foot    2. DM type 2, uncontrolled, with neuropathy    3. Dry skin    4. Neuropathic pain of both feet        Plan:         Advised patient with diabetes is " absolutely crucial she take better care of her skin on her feet.  Explained it is excessively dry leading to cracks especially on the left heel and development of calluses on the big toes bottom of the right foot which puts her at risk for complications.  All calluses were debrided, heels were smoothed  Had a lengthy discussion with patient regarding daily topical treatment of her choice, must be applied daily.  Discussed diabetic foot lotion/cold water and, Eucerin, Amlactin, CervaVe.  Heavy amount to the heels should be applied a 2nd time just before bed  Monitor for any changes, any redness, bleeding  We also had a lengthy discussion regarding appropriate shoes which must be worn indoors as well.  Advised patient flat or open backed shoes, sandals or flip-flops, even Crocs highly contributes to chronic thick dry and cracking skin on the heels.  She needs to wear a light weight wide comfortable tennis shoe with memory foam even indoors  We reviewed diabetic education   Reviewed need for daily foot checks and instructed patient to contact the office with any area of redness or swelling which has not improved within 3 days.  Patient was in understanding and agreement with treatment plan.  I counseled the patient on their conditions, implications and medical management.  Instructed patient to contact the office with any changes, questions, concerns, worsening of symptoms.   Total face-to-face time, exam, assessment, treatment, discussion, documentation 30 minutes, more than half this time spent on consultation and coordination of care.  Follow up prn 3 months      This note was created using [employer] voice recognition software that occasionally misinterpreted phrases or words.

## 2022-10-25 ENCOUNTER — OFFICE VISIT (OUTPATIENT)
Dept: PODIATRY | Facility: CLINIC | Age: 67
End: 2022-10-25
Payer: MEDICARE

## 2022-10-25 VITALS
BODY MASS INDEX: 36.14 KG/M2 | HEART RATE: 66 BPM | SYSTOLIC BLOOD PRESSURE: 112 MMHG | DIASTOLIC BLOOD PRESSURE: 71 MMHG | RESPIRATION RATE: 18 BRPM | WEIGHT: 204 LBS | HEIGHT: 63 IN

## 2022-10-25 DIAGNOSIS — Z79.4 TYPE 2 DIABETES MELLITUS WITH HYPERGLYCEMIA, WITH LONG-TERM CURRENT USE OF INSULIN: ICD-10-CM

## 2022-10-25 DIAGNOSIS — G57.93 NEUROPATHIC PAIN OF BOTH FEET: Primary | ICD-10-CM

## 2022-10-25 DIAGNOSIS — L85.3 DRY SKIN: ICD-10-CM

## 2022-10-25 DIAGNOSIS — R23.4 FISSURE IN SKIN OF FOOT: ICD-10-CM

## 2022-10-25 DIAGNOSIS — R25.2 FOOT CRAMPS: ICD-10-CM

## 2022-10-25 DIAGNOSIS — E11.65 TYPE 2 DIABETES MELLITUS WITH HYPERGLYCEMIA, WITH LONG-TERM CURRENT USE OF INSULIN: ICD-10-CM

## 2022-10-25 PROCEDURE — 1160F PR REVIEW ALL MEDS BY PRESCRIBER/CLIN PHARMACIST DOCUMENTED: ICD-10-PCS | Mod: CPTII,S$GLB,, | Performed by: PODIATRIST

## 2022-10-25 PROCEDURE — 4010F ACE/ARB THERAPY RXD/TAKEN: CPT | Mod: CPTII,S$GLB,, | Performed by: PODIATRIST

## 2022-10-25 PROCEDURE — 1101F PT FALLS ASSESS-DOCD LE1/YR: CPT | Mod: CPTII,S$GLB,, | Performed by: PODIATRIST

## 2022-10-25 PROCEDURE — 1159F MED LIST DOCD IN RCRD: CPT | Mod: CPTII,S$GLB,, | Performed by: PODIATRIST

## 2022-10-25 PROCEDURE — 1159F PR MEDICATION LIST DOCUMENTED IN MEDICAL RECORD: ICD-10-PCS | Mod: CPTII,S$GLB,, | Performed by: PODIATRIST

## 2022-10-25 PROCEDURE — 4010F PR ACE/ARB THEARPY RXD/TAKEN: ICD-10-PCS | Mod: CPTII,S$GLB,, | Performed by: PODIATRIST

## 2022-10-25 PROCEDURE — 3078F PR MOST RECENT DIASTOLIC BLOOD PRESSURE < 80 MM HG: ICD-10-PCS | Mod: CPTII,S$GLB,, | Performed by: PODIATRIST

## 2022-10-25 PROCEDURE — 3288F FALL RISK ASSESSMENT DOCD: CPT | Mod: CPTII,S$GLB,, | Performed by: PODIATRIST

## 2022-10-25 PROCEDURE — 1125F PR PAIN SEVERITY QUANTIFIED, PAIN PRESENT: ICD-10-PCS | Mod: CPTII,S$GLB,, | Performed by: PODIATRIST

## 2022-10-25 PROCEDURE — 1160F RVW MEDS BY RX/DR IN RCRD: CPT | Mod: CPTII,S$GLB,, | Performed by: PODIATRIST

## 2022-10-25 PROCEDURE — 1125F AMNT PAIN NOTED PAIN PRSNT: CPT | Mod: CPTII,S$GLB,, | Performed by: PODIATRIST

## 2022-10-25 PROCEDURE — 99214 OFFICE O/P EST MOD 30 MIN: CPT | Mod: S$GLB,,, | Performed by: PODIATRIST

## 2022-10-25 PROCEDURE — 99999 PR PBB SHADOW E&M-EST. PATIENT-LVL V: ICD-10-PCS | Mod: PBBFAC,,, | Performed by: PODIATRIST

## 2022-10-25 PROCEDURE — 3078F DIAST BP <80 MM HG: CPT | Mod: CPTII,S$GLB,, | Performed by: PODIATRIST

## 2022-10-25 PROCEDURE — 99214 PR OFFICE/OUTPT VISIT, EST, LEVL IV, 30-39 MIN: ICD-10-PCS | Mod: S$GLB,,, | Performed by: PODIATRIST

## 2022-10-25 PROCEDURE — 3074F SYST BP LT 130 MM HG: CPT | Mod: CPTII,S$GLB,, | Performed by: PODIATRIST

## 2022-10-25 PROCEDURE — 99999 PR PBB SHADOW E&M-EST. PATIENT-LVL V: CPT | Mod: PBBFAC,,, | Performed by: PODIATRIST

## 2022-10-25 PROCEDURE — 1101F PR PT FALLS ASSESS DOC 0-1 FALLS W/OUT INJ PAST YR: ICD-10-PCS | Mod: CPTII,S$GLB,, | Performed by: PODIATRIST

## 2022-10-25 PROCEDURE — 3074F PR MOST RECENT SYSTOLIC BLOOD PRESSURE < 130 MM HG: ICD-10-PCS | Mod: CPTII,S$GLB,, | Performed by: PODIATRIST

## 2022-10-25 PROCEDURE — 3288F PR FALLS RISK ASSESSMENT DOCUMENTED: ICD-10-PCS | Mod: CPTII,S$GLB,, | Performed by: PODIATRIST

## 2022-10-25 RX ORDER — HYDROCODONE BITARTRATE AND ACETAMINOPHEN 7.5; 325 MG/1; MG/1
1 TABLET ORAL EVERY 6 HOURS PRN
Qty: 28 TABLET | Refills: 0 | Status: SHIPPED | OUTPATIENT
Start: 2022-10-25 | End: 2022-11-01

## 2022-10-25 RX ORDER — CYCLOBENZAPRINE HCL 10 MG
10 TABLET ORAL NIGHTLY
Qty: 30 TABLET | Refills: 0 | Status: SHIPPED | OUTPATIENT
Start: 2022-10-25 | End: 2022-12-12

## 2022-10-25 NOTE — PROGRESS NOTES
Subjective:       Patient ID: Syl Matos is a 67 y.o. female.    Chief Complaint: Follow-up, Foot Pain, Skin Problem, Callouses, and Diabetes Mellitus  Patient presents for follow-up chronic neuropathic pain feet, uncontrolled type 2 diabetes with neuropathic foot pain, foot cramps, dry skin.  Confirms compliance with Lyrica 75 mg twice daily, Flexeril 10 mg 1 at bedtime.  Patient helpful at night.  She relates at times nerve pain is so bad nothing helps.  Will go through flare-ups of increased burning, tingling and difficulty sleeping.  Relates diabetes has been fairly well controlled, glucose fluctuate, was 130 last night.  Pain level feet 3/10        Past Medical History:   Diagnosis Date    Atrial fibrillation     CKD (chronic kidney disease)     COPD (chronic obstructive pulmonary disease)     Diabetes mellitus     Hypercholesteremia     Hypertension      Past Surgical History:   Procedure Laterality Date    FRACTURE SURGERY      pelvis surgery    SHOULDER SURGERY Left     TUBAL LIGATION       Family History   Problem Relation Age of Onset    Heart disease Mother     Breast cancer Mother     Heart disease Father      Social History     Socioeconomic History    Marital status:    Tobacco Use    Smoking status: Never    Smokeless tobacco: Never   Substance and Sexual Activity    Alcohol use: No    Drug use: No       Current Outpatient Medications   Medication Sig Dispense Refill    ACCU-CHEK GUIDE GLUCOSE METER Misc 3 (three) times daily. Use to test blood glucose      ACCU-CHEK GUIDE TEST STRIPS Strp 3 (three) times daily. Use to test blood glucose      albuterol (PROVENTIL/VENTOLIN HFA) 90 mcg/actuation inhaler Inhale 1-2 puffs into the lungs every 6 (six) hours as needed for Wheezing or Shortness of Breath. Rescue 18 g 2    albuterol-ipratropium (DUO-NEB) 2.5 mg-0.5 mg/3 mL nebulizer solution USE 1 VIAL IN NEBULIZER THREE TIMES DAILY AS NEEDED      amLODIPine (NORVASC) 10 MG tablet 10 mg.       aspirin 81 MG Chew Take 81 mg by mouth once daily.      busPIRone (BUSPAR) 15 MG tablet Take 15 mg by mouth once daily.       carvediloL (COREG) 25 MG tablet Take 1 tablet (25 mg total) by mouth 2 (two) times daily with meals. 60 tablet 11    cholecalciferol, vitamin D3, (VITAMIN D3) 50 mcg (2,000 unit) Tab Take 1 tablet (2,000 Units total) by mouth once daily.      EScitalopram oxalate (LEXAPRO) 20 MG tablet Take 20 mg by mouth every morning.      fluticasone-salmeterol diskus inhaler 250-50 mcg Inhale 1 puff into the lungs 2 (two) times daily. Controller 60 each 2    furosemide (LASIX) 20 MG tablet Take 20 mg by mouth 2 (two) times daily.      glimepiride (AMARYL) 4 MG tablet Take 4 mg by mouth.      hydroCHLOROthiazide (HYDRODIURIL) 25 MG tablet 25 mg.      insulin (LANTUS SOLOSTAR U-100 INSULIN) glargine 100 units/mL (3mL) SubQ pen Inject 60 Units into the skin once daily.      lisinopriL (PRINIVIL,ZESTRIL) 20 MG tablet 20 mg.      loratadine (CLARITIN) 10 mg tablet   0    metFORMIN (GLUCOPHAGE) 1000 MG tablet Take 1,000 mg by mouth 2 (two) times daily.      metoprolol tartrate (LOPRESSOR) 50 MG tablet 50 mg.      ondansetron (ZOFRAN-ODT) 4 MG TbDL DISSOLVE ONE TABLET BY MOUTH THREE TIMES DAILY AS NEEDED FORNAUSEA AND VOMITING      pravastatin (PRAVACHOL) 20 MG tablet Take 20 mg by mouth once daily.      pregabalin (LYRICA) 75 MG capsule Take 75 mg by mouth 2 (two) times daily.      simvastatin (ZOCOR) 40 MG tablet TAKE 1 TABLET BY MOUTH EVERY MORNING. 90 tablet 2    SITagliptin (JANUVIA) 100 MG Tab Take 100 mg by mouth once daily.      traZODone (DESYREL) 50 MG tablet Take 50 mg by mouth nightly.      triamterene-hydrochlorothiazide 37.5-25 mg (DYAZIDE) 37.5-25 mg per capsule Take 1 capsule by mouth every morning.      cyclobenzaprine (FLEXERIL) 10 MG tablet Take 1 tablet (10 mg total) by mouth every evening. 30 tablet 0    HYDROcodone-acetaminophen (NORCO) 7.5-325 mg per tablet Take 1 tablet by mouth every 6  "(six) hours as needed for Pain. 28 tablet 0    insulin lispro 100 unit/mL injection Inject 14 Units into the skin 3 (three) times daily with meals. 12.6 mL 11    omega-3 fatty acids 1,000 mg Cap Take 2 capsules (2,000 mg total) by mouth 2 (two) times daily. Take as directed for triglycerides and to decrease risk of heart disease and stroke.  0     No current facility-administered medications for this visit.     Review of patient's allergies indicates:   Allergen Reactions    Iodine and iodide containing products Hives       Review of Systems   HENT:  Negative for congestion.    Respiratory:  Negative for cough and shortness of breath.    Endocrine:        DM 2 x 25 yrs   Musculoskeletal:  Negative for gait problem.   All other systems reviewed and are negative.    Objective:      Vitals:    10/25/22 0931   BP: 112/71   Pulse: 66   Resp: 18   Weight: 92.5 kg (204 lb)   Height: 5' 3" (1.6 m)     Physical Exam  Vitals and nursing note reviewed.   Constitutional:       General: She is not in acute distress.  Cardiovascular:      Pulses:           Dorsalis pedis pulses are 2+ on the right side and 2+ on the left side.        Posterior tibial pulses are 1+ on the right side and 1+ on the left side.   Pulmonary:      Effort: Pulmonary effort is normal.   Musculoskeletal:         General: No deformity. Normal range of motion.      Right foot: Normal range of motion. No deformity.      Left foot: Normal range of motion. No deformity.   Feet:      Right foot:      Skin integrity: Callus and dry skin present.      Left foot:      Skin integrity: Callus (Raised hyperkeratotic tissue medial b/l hallux) and dry skin (Excessively dry skin bilateral plantar feet especially heels with small tender fissure left heel.  No complications) present.   Skin:     General: Skin is dry.      Capillary Refill: Capillary refill takes 2 to 3 seconds.   Neurological:      Sensory: Sensory deficit present.      Comments: Painful paresthesias, " neuropathic foot pain   Psychiatric:         Mood and Affect: Mood normal.         Behavior: Behavior normal.                                         Contains abnormal data Comprehensive Metabolic Panel (CMP)  Component Ref Range & Units 3 mo ago   (7/8/22) 5 mo ago   (5/26/22) 6 mo ago   (4/26/22) 6 mo ago   (3/31/22)   Sodium 136 - 145 mmol/L 140  143  140  139    Potassium 3.5 - 5.1 mmol/L 4.4  4.1  4.5  3.9    Chloride 95 - 110 mmol/L 105  107  100  101    CO2 23 - 29 mmol/L 24  24  29  28    Glucose 70 - 110 mg/dL 185 High   155 High   208 High   214 High     BUN 8 - 23 mg/dL 16  15  20  12    Creatinine 0.5 - 1.4 mg/dL 0.9  0.8  1.0  0.9    Calcium 8.7 - 10.5 mg/dL 8.6 Low   9.2  9.3  9.2    Total Protein 6.0 - 8.4 g/dL 6.8  7.5  8.0     Albumin 3.5 - 5.2 g/dL 3.4 Low   3.6  3.7     Total Bilirubin 0.1 - 1.0 mg/dL 0.5  0.4 CM  0.4 CM        Alkaline Phosphatase 55 - 135 U/L 45 Low   58  57     AST 10 - 40 U/L 19  14  11     ALT 10 - 44 U/L 18  16  13     Anion Gap 8 - 16 mmol/L 11  12  11  10    eGFR if African American >60 mL/min/1.73 m^2 >60.0  >60.0  >60.0  >60.0    eGFR if non African American >60 mL/min/1.73 m^2 >60.0  >60.0 CM  59.3 Abnormal  CM  >60.0 CM              Assessment:       1. Neuropathic pain of both feet    2. Fissure in skin of foot    3. Dry skin    4. Foot cramps    5. Type 2 diabetes mellitus with hyperglycemia, with long-term current use of insulin          Plan:         CYCLOBENZAPRINE 1 AT BEDTIME  HYDROCODONE 7.5 MG Q.6H P.R.N. NEUROPATHIC FOOT PAIN      Reviewed neuropathic pain in feet and advised patient better control of diabetes is the best way to prevent further complications with this condition.  We discussed flare-ups which are common, this can be to prolonged walking, standing, shoes or fluctuations in glucose.  We discussed continuing Flexeril nightly for pain, advised muscle pain is very common once nerves irritate the muscles, this is normal at the end of the day.  We  reviewed pain medication taken as needed for a day or 2 for flare-ups.  Reviewed compliance with these medications, discussed Lyrica, side effects to monitor for and instructed patient do not take muscle relaxer and pain medication at the same time.  Reviewed diabetic education  Reviewed neuropathy  Reviewed uncontrolled diabetes, daily glucose and the benefits of better/tighter control of diabetes   High risk for complications with dry cracked skin on heels, poorly maintained calluses side of the big toes, reviewed potential complications  Instructed patient she needs to start applying foot lotion daily to her heels, slides of the big toes, recommended gold bond diabetic foot lotion.  Apply once daily, make sure it is thoroughly absorbed into the skin and dry before applying socks and shoes.  Reviewed appropriate shoes especially indoors to cushion and support the heel  Calluses debrided and we reviewed better maintenance.  Again reviewed maintenance dry skin.    Reviewed need for daily foot checks and instructed patient to contact the office with any area of redness or swelling which has not improved within 3 days.  Patient was in understanding and agreement with treatment plan.  I counseled the patient on their conditions, implications and medical management.  Instructed patient to contact the office with any changes, questions, concerns, worsening of symptoms.   Total face-to-face time, exam, assessment, treatment, discussion, documentation 30 minutes, more than half this time spent on consultation and coordination of care.  Follow up prn 3 months      This note was created using [employer] voice recognition software that occasionally misinterpreted phrases or words.

## 2022-11-20 ENCOUNTER — HOSPITAL ENCOUNTER (EMERGENCY)
Facility: HOSPITAL | Age: 67
Discharge: HOME | End: 2022-11-20
Attending: INTERNAL MEDICINE
Payer: MEDICARE

## 2022-11-20 VITALS
BODY MASS INDEX: 37.73 KG/M2 | HEART RATE: 68 BPM | WEIGHT: 205 LBS | OXYGEN SATURATION: 95 % | SYSTOLIC BLOOD PRESSURE: 113 MMHG | DIASTOLIC BLOOD PRESSURE: 58 MMHG | TEMPERATURE: 98 F | RESPIRATION RATE: 20 BRPM | HEIGHT: 62 IN

## 2022-11-20 DIAGNOSIS — I51.7 CARDIOMEGALY: ICD-10-CM

## 2022-11-20 DIAGNOSIS — R05.9 COUGH: ICD-10-CM

## 2022-11-20 DIAGNOSIS — J10.1 INFLUENZA A: Primary | ICD-10-CM

## 2022-11-20 LAB
INFLUENZA A, MOLECULAR: POSITIVE
INFLUENZA B, MOLECULAR: NEGATIVE
SARS-COV-2 RDRP RESP QL NAA+PROBE: NEGATIVE
SPECIMEN SOURCE: ABNORMAL

## 2022-11-20 PROCEDURE — 99283 EMERGENCY DEPT VISIT LOW MDM: CPT | Mod: 25

## 2022-11-20 PROCEDURE — 87502 INFLUENZA DNA AMP PROBE: CPT | Performed by: NURSE PRACTITIONER

## 2022-11-20 PROCEDURE — 71046 XR CHEST PA AND LATERAL: ICD-10-PCS | Mod: 26,,, | Performed by: RADIOLOGY

## 2022-11-20 PROCEDURE — 71046 X-RAY EXAM CHEST 2 VIEWS: CPT | Mod: 26,,, | Performed by: RADIOLOGY

## 2022-11-20 PROCEDURE — 71046 X-RAY EXAM CHEST 2 VIEWS: CPT | Mod: TC

## 2022-11-20 PROCEDURE — U0002 COVID-19 LAB TEST NON-CDC: HCPCS | Performed by: NURSE PRACTITIONER

## 2022-11-20 RX ORDER — OSELTAMIVIR PHOSPHATE 75 MG/1
75 CAPSULE ORAL 2 TIMES DAILY
Qty: 10 CAPSULE | Refills: 0 | Status: SHIPPED | OUTPATIENT
Start: 2022-11-20 | End: 2022-11-25

## 2022-11-20 NOTE — ED PROVIDER NOTES
Encounter Date: 11/20/2022       History     Chief Complaint   Patient presents with    Cough     Cough and weakness      Patient has had a cough with green-yellow mucus for last 2 days.  No fever chills.  No nausea vomiting neurological changes.  Patient has history of COPD, chronic atrial fibrillation.      Review of patient's allergies indicates:   Allergen Reactions    Iodine and iodide containing products Hives     Past Medical History:   Diagnosis Date    Atrial fibrillation     CKD (chronic kidney disease)     COPD (chronic obstructive pulmonary disease)     Diabetes mellitus     Hypercholesteremia     Hypertension      Past Surgical History:   Procedure Laterality Date    FRACTURE SURGERY      pelvis surgery    SHOULDER SURGERY Left     TUBAL LIGATION       Family History   Problem Relation Age of Onset    Heart disease Mother     Breast cancer Mother     Heart disease Father      Social History     Tobacco Use    Smoking status: Never    Smokeless tobacco: Never   Substance Use Topics    Alcohol use: No    Drug use: No     Review of Systems   Constitutional:  Negative for fever.   HENT:  Negative for sore throat.    Respiratory:  Negative for shortness of breath.    Cardiovascular:  Negative for chest pain.   Gastrointestinal:  Negative for nausea.   Genitourinary:  Negative for dysuria.   Musculoskeletal:  Negative for back pain.   Skin:  Negative for rash.   Neurological:  Negative for weakness.   Hematological:  Does not bruise/bleed easily.     Physical Exam     Initial Vitals [11/20/22 1240]   BP Pulse Resp Temp SpO2   (!) 109/51 65 18 98.4 °F (36.9 °C) (!) 94 %      MAP       --         Physical Exam    Vitals reviewed.  Constitutional: She appears well-developed.   Eyes: Pupils are equal, round, and reactive to light.   Neck:   Normal range of motion.  Cardiovascular:  Normal rate.           Pulmonary/Chest: No accessory muscle usage. No respiratory distress. She has rhonchi.   Abdominal: Abdomen is  soft.   Musculoskeletal:         General: Normal range of motion.      Cervical back: Normal range of motion.     Neurological: She is alert. She has normal strength and normal reflexes. No cranial nerve deficit or sensory deficit. GCS eye subscore is 4. GCS verbal subscore is 5. GCS motor subscore is 6.   Skin: Skin is warm.   Psychiatric: She has a normal mood and affect.       ED Course   Procedures  Labs Reviewed   INFLUENZA A & B BY MOLECULAR - Abnormal; Notable for the following components:       Result Value    Influenza A, Molecular Positive (*)     All other components within normal limits   SARS-COV-2 RNA AMPLIFICATION, QUAL    Narrative:     Is the patient symptomatic?->Yes          Imaging Results              X-Ray Chest PA And Lateral (Final result)  Result time 11/20/22 13:25:14      Final result by Hyun Hurley MD (11/20/22 13:25:14)                   Impression:      Cardiomegaly with central pulmonary vascular congestion.      Electronically signed by: Hyun Hurley  Date:    11/20/2022  Time:    13:25               Narrative:    EXAMINATION:  XR CHEST PA AND LATERAL    CLINICAL HISTORY:  Cough, unspecified    TECHNIQUE:  PA and lateral views of the chest were performed.    COMPARISON:  Multiple priors, most recent 07/08/2022    FINDINGS:  Postsurgical changes of the proximal left humerus.    The lungs are clear.  No focal consolidation, pleural effusion or pneumothorax.    Mild cardiomegaly with central pulmonary vascular congestion.    No acute or aggressive osseous abnormality.                                       Medications - No data to display  Medical Decision Making:   ED Management:  Patient the chest came back showing she had influenza.  Discussed this with start Tamiflu.  Also on chest x-ray there was evidence of cardiomegaly, she sees cardiologist, advised to follow and checked at least maybe echocardiogram or further workup.           ED Course as of 11/20/22 1346   Sun Nov 20, 2022    1341 Influenza A & B by Molecular(!) [PW]   1342 Influenza A, Molecular(!): Positive [PW]   1342 Influenza B, Molecular: Negative [PW]   1342 SARS-CoV-2 RNA, Amplification, Qual: Negative [PW]   1342 X-Ray Chest PA And Lateral [PW]   1342 X-Ray Chest PA And Lateral [PW]      ED Course User Index  [PW] Gamaliel Trimble MD                 Clinical Impression:   Final diagnoses:  [R05.9] Cough  [J10.1] Influenza A (Primary)  [I51.7] Cardiomegaly        ED Disposition Condition    Discharge Stable          ED Prescriptions       Medication Sig Dispense Start Date End Date Auth. Provider    oseltamivir (TAMIFLU) 75 MG capsule Take 1 capsule (75 mg total) by mouth 2 (two) times daily. for 5 days 10 capsule 11/20/2022 11/25/2022 Gamaliel Trimble MD          Follow-up Information       Follow up With Specialties Details Why Contact Info    Luis Diaz MD Family Medicine In 2 days  [id number]  [facility] [address] [phone]               Gamaliel Trimble MD  11/20/22 1412

## 2023-01-03 ENCOUNTER — OFFICE VISIT (OUTPATIENT)
Dept: PODIATRY | Facility: CLINIC | Age: 68
End: 2023-01-03
Payer: MEDICARE

## 2023-01-03 VITALS
SYSTOLIC BLOOD PRESSURE: 157 MMHG | RESPIRATION RATE: 16 BRPM | HEART RATE: 69 BPM | HEIGHT: 62 IN | BODY MASS INDEX: 38.5 KG/M2 | DIASTOLIC BLOOD PRESSURE: 76 MMHG | WEIGHT: 209.19 LBS

## 2023-01-03 DIAGNOSIS — Z79.4 TYPE 2 DIABETES MELLITUS WITH HYPERGLYCEMIA, WITH LONG-TERM CURRENT USE OF INSULIN: ICD-10-CM

## 2023-01-03 DIAGNOSIS — L84 FOOT CALLUS: ICD-10-CM

## 2023-01-03 DIAGNOSIS — E11.9 COMPREHENSIVE DIABETIC FOOT EXAMINATION, TYPE 2 DM, ENCOUNTER FOR: Primary | ICD-10-CM

## 2023-01-03 DIAGNOSIS — E11.65 TYPE 2 DIABETES MELLITUS WITH HYPERGLYCEMIA, WITH LONG-TERM CURRENT USE OF INSULIN: ICD-10-CM

## 2023-01-03 DIAGNOSIS — R25.2 MUSCLE CRAMP: ICD-10-CM

## 2023-01-03 DIAGNOSIS — G57.93 NEUROPATHIC PAIN OF BOTH FEET: ICD-10-CM

## 2023-01-03 PROCEDURE — 3078F DIAST BP <80 MM HG: CPT | Mod: CPTII,S$GLB,, | Performed by: PODIATRIST

## 2023-01-03 PROCEDURE — 1126F PR PAIN SEVERITY QUANTIFIED, NO PAIN PRESENT: ICD-10-PCS | Mod: CPTII,S$GLB,, | Performed by: PODIATRIST

## 2023-01-03 PROCEDURE — 3078F PR MOST RECENT DIASTOLIC BLOOD PRESSURE < 80 MM HG: ICD-10-PCS | Mod: CPTII,S$GLB,, | Performed by: PODIATRIST

## 2023-01-03 PROCEDURE — 1101F PR PT FALLS ASSESS DOC 0-1 FALLS W/OUT INJ PAST YR: ICD-10-PCS | Mod: CPTII,S$GLB,, | Performed by: PODIATRIST

## 2023-01-03 PROCEDURE — 3077F PR MOST RECENT SYSTOLIC BLOOD PRESSURE >= 140 MM HG: ICD-10-PCS | Mod: CPTII,S$GLB,, | Performed by: PODIATRIST

## 2023-01-03 PROCEDURE — 1159F PR MEDICATION LIST DOCUMENTED IN MEDICAL RECORD: ICD-10-PCS | Mod: CPTII,S$GLB,, | Performed by: PODIATRIST

## 2023-01-03 PROCEDURE — 3008F PR BODY MASS INDEX (BMI) DOCUMENTED: ICD-10-PCS | Mod: CPTII,S$GLB,, | Performed by: PODIATRIST

## 2023-01-03 PROCEDURE — 1126F AMNT PAIN NOTED NONE PRSNT: CPT | Mod: CPTII,S$GLB,, | Performed by: PODIATRIST

## 2023-01-03 PROCEDURE — 99214 OFFICE O/P EST MOD 30 MIN: CPT | Mod: S$GLB,,, | Performed by: PODIATRIST

## 2023-01-03 PROCEDURE — 3288F FALL RISK ASSESSMENT DOCD: CPT | Mod: CPTII,S$GLB,, | Performed by: PODIATRIST

## 2023-01-03 PROCEDURE — 99214 PR OFFICE/OUTPT VISIT, EST, LEVL IV, 30-39 MIN: ICD-10-PCS | Mod: S$GLB,,, | Performed by: PODIATRIST

## 2023-01-03 PROCEDURE — 3288F PR FALLS RISK ASSESSMENT DOCUMENTED: ICD-10-PCS | Mod: CPTII,S$GLB,, | Performed by: PODIATRIST

## 2023-01-03 PROCEDURE — 99999 PR PBB SHADOW E&M-EST. PATIENT-LVL V: ICD-10-PCS | Mod: PBBFAC,,, | Performed by: PODIATRIST

## 2023-01-03 PROCEDURE — 1159F MED LIST DOCD IN RCRD: CPT | Mod: CPTII,S$GLB,, | Performed by: PODIATRIST

## 2023-01-03 PROCEDURE — 1160F RVW MEDS BY RX/DR IN RCRD: CPT | Mod: CPTII,S$GLB,, | Performed by: PODIATRIST

## 2023-01-03 PROCEDURE — 1101F PT FALLS ASSESS-DOCD LE1/YR: CPT | Mod: CPTII,S$GLB,, | Performed by: PODIATRIST

## 2023-01-03 PROCEDURE — 3077F SYST BP >= 140 MM HG: CPT | Mod: CPTII,S$GLB,, | Performed by: PODIATRIST

## 2023-01-03 PROCEDURE — 3008F BODY MASS INDEX DOCD: CPT | Mod: CPTII,S$GLB,, | Performed by: PODIATRIST

## 2023-01-03 PROCEDURE — 99999 PR PBB SHADOW E&M-EST. PATIENT-LVL V: CPT | Mod: PBBFAC,,, | Performed by: PODIATRIST

## 2023-01-03 PROCEDURE — 1160F PR REVIEW ALL MEDS BY PRESCRIBER/CLIN PHARMACIST DOCUMENTED: ICD-10-PCS | Mod: CPTII,S$GLB,, | Performed by: PODIATRIST

## 2023-01-03 RX ORDER — AZITHROMYCIN 500 MG/1
500 TABLET, FILM COATED ORAL
COMMUNITY
Start: 2022-10-14 | End: 2023-01-07

## 2023-01-03 RX ORDER — INSULIN GLARGINE 100 [IU]/ML
INJECTION, SOLUTION SUBCUTANEOUS
COMMUNITY
Start: 2022-10-01 | End: 2023-10-20 | Stop reason: SDUPTHER

## 2023-01-03 RX ORDER — NITROGLYCERIN 0.4 MG/1
TABLET SUBLINGUAL
COMMUNITY
Start: 2022-11-18

## 2023-01-03 RX ORDER — METOPROLOL TARTRATE 25 MG/1
25 TABLET, FILM COATED ORAL 2 TIMES DAILY
COMMUNITY
Start: 2022-10-06 | End: 2023-01-07

## 2023-01-03 RX ORDER — LANCETS
EACH MISCELLANEOUS
COMMUNITY
Start: 2022-10-20 | End: 2023-01-07

## 2023-01-03 RX ORDER — BROMPHENIRAMINE MALEATE, DEXTROMETHORPHAN HYDROBROMIDE, PHENYLEPHRINE HYDROCHLORIDE 1; 5; 2.5 MG/5ML; MG/5ML; MG/5ML
10 LIQUID ORAL
COMMUNITY
Start: 2022-10-14 | End: 2023-07-24

## 2023-01-07 NOTE — PROGRESS NOTES
Subjective:       Patient ID: Syl Matos is a 67 y.o. female.    Chief Complaint: Follow-up and Diabetic Foot Exam  Patient presents for annual diabetic foot exam, follow-up chronic neuropathic pain feet, uncontrolled type 2 diabetes. Complaining of right leg cramping. Confirms she is staying hydrated and drinks water daily. Confirms compliance with Lyrica 75 mg twice daily, Flexeril 10 mg 1 at bedtime.  Has flare-ups of increased burning, tingling and difficulty sleeping, not as often. Does not know her A1c.  Reports glucose was 127 this morning. No pain in her feet this morning          Past Medical History:   Diagnosis Date    Atrial fibrillation     CKD (chronic kidney disease)     COPD (chronic obstructive pulmonary disease)     Diabetes mellitus     Hypercholesteremia     Hypertension      Past Surgical History:   Procedure Laterality Date    FRACTURE SURGERY      pelvis surgery    SHOULDER SURGERY Left     TUBAL LIGATION       Family History   Problem Relation Age of Onset    Heart disease Mother     Breast cancer Mother     Heart disease Father      Social History     Socioeconomic History    Marital status:    Tobacco Use    Smoking status: Never    Smokeless tobacco: Never   Substance and Sexual Activity    Alcohol use: No    Drug use: No    Sexual activity: Not Currently     Partners: Male       Current Outpatient Medications   Medication Sig Dispense Refill    albuterol (PROVENTIL/VENTOLIN HFA) 90 mcg/actuation inhaler Inhale 1-2 puffs into the lungs every 6 (six) hours as needed for Wheezing or Shortness of Breath. Rescue 18 g 2    albuterol-ipratropium (DUO-NEB) 2.5 mg-0.5 mg/3 mL nebulizer solution USE 1 VIAL IN NEBULIZER THREE TIMES DAILY AS NEEDED      aspirin 81 MG Chew Take 81 mg by mouth once daily.      busPIRone (BUSPAR) 15 MG tablet Take 15 mg by mouth once daily.       carvediloL (COREG) 25 MG tablet Take 1 tablet (25 mg total) by mouth 2 (two) times daily with meals. 60 tablet  11    cholecalciferol, vitamin D3, (VITAMIN D3) 50 mcg (2,000 unit) Tab Take 1 tablet (2,000 Units total) by mouth once daily.      cyclobenzaprine (FLEXERIL) 10 MG tablet TAKE 1 TABLET(10 MG) BY MOUTH EVERY EVENING 30 tablet 0    LANTUS U-100 INSULIN 100 unit/mL injection SMARTSI Unit(s) SUB-Q Daily      metoprolol tartrate (LOPRESSOR) 50 MG tablet 50 mg.      ondansetron (ZOFRAN-ODT) 4 MG TbDL DISSOLVE ONE TABLET BY MOUTH THREE TIMES DAILY AS NEEDED FORNAUSEA AND VOMITING      pregabalin (LYRICA) 75 MG capsule Take 75 mg by mouth 2 (two) times daily.      SITagliptin (JANUVIA) 100 MG Tab Take 100 mg by mouth once daily.      traZODone (DESYREL) 50 MG tablet Take 50 mg by mouth nightly.      amLODIPine (NORVASC) 10 MG tablet 10 mg.      EScitalopram oxalate (LEXAPRO) 20 MG tablet Take 20 mg by mouth every morning.      glimepiride (AMARYL) 4 MG tablet Take 4 mg by mouth.      insulin lispro 100 unit/mL injection Inject 14 Units into the skin 3 (three) times daily with meals. 12.6 mL 11    lisinopriL (PRINIVIL,ZESTRIL) 20 MG tablet 20 mg.      loratadine (CLARITIN) 10 mg tablet   0    metFORMIN (GLUCOPHAGE) 1000 MG tablet Take 1,000 mg by mouth 2 (two) times daily.      nitroGLYCERIN (NITROSTAT) 0.4 MG SL tablet Place under the tongue.      omega-3 fatty acids 1,000 mg Cap Take 2 capsules (2,000 mg total) by mouth 2 (two) times daily. Take as directed for triglycerides and to decrease risk of heart disease and stroke.  0    pravastatin (PRAVACHOL) 20 MG tablet Take 20 mg by mouth once daily.      RYNEX DM 1-2.5-5 mg/5 mL Soln Take 10 mLs by mouth.      simvastatin (ZOCOR) 40 MG tablet TAKE 1 TABLET BY MOUTH EVERY MORNING. 90 tablet 2     No current facility-administered medications for this visit.     Review of patient's allergies indicates:   Allergen Reactions    Iodine and iodide containing products Hives       Review of Systems   HENT:  Negative for congestion.    Respiratory:  Negative for cough and  "shortness of breath.    Cardiovascular:  Positive for leg swelling.        Mild lower leg edema    Endocrine:        DM 2 x 25 yrs   Musculoskeletal:  Negative for gait problem.   All other systems reviewed and are negative.    Objective:      Vitals:    01/03/23 1157   BP: (!) 157/76   Pulse: 69   Resp: 16   Weight: 94.9 kg (209 lb 3.2 oz)   Height: 5' 2" (1.575 m)     Physical Exam  Vitals and nursing note reviewed.   Constitutional:       General: She is not in acute distress.  Cardiovascular:      Pulses:           Dorsalis pedis pulses are 2+ on the right side and 2+ on the left side.        Posterior tibial pulses are 1+ on the right side and 1+ on the left side.   Pulmonary:      Effort: Pulmonary effort is normal.   Musculoskeletal:         General: No deformity. Normal range of motion.      Right foot: Normal range of motion. No deformity.      Left foot: Normal range of motion. No deformity.   Feet:      Right foot:      Protective Sensation: 6 sites tested.  6 sites sensed.      Skin integrity: Callus and dry skin present.      Toenail Condition: Right toenails are long.      Left foot:      Protective Sensation: 6 sites tested.  6 sites sensed.      Skin integrity: Callus (mild, improved hyperkeratotic tissue medial b/l hallux) and dry skin (Excessively dry skin bilateral plantar feet especially heels with small tender fissure left heel.  No complications) present.      Toenail Condition: Left toenails are long.   Skin:     General: Skin is dry.      Capillary Refill: Capillary refill takes 2 to 3 seconds.   Neurological:      Comments: Sensation intact all areas b/l feet with monofilament testing. Painful paresthesias, neuropathic foot pain   Psychiatric:         Mood and Affect: Mood normal.         Behavior: Behavior normal.         Thought Content: Thought content normal.         Judgment: Judgment normal.                       Comprehensive Metabolic Panel (CMP)  Component Ref Range & Units 6 mo ago "   (7/8/22) 7 mo ago   (5/26/22) 8 mo ago   (4/26/22) 9 mo ago   (3/31/22)   Sodium 136 - 145 mmol/L 140  143  140  139    Potassium 3.5 - 5.1 mmol/L 4.4  4.1  4.5  3.9    Chloride 95 - 110 mmol/L 105  107  100  101    CO2 23 - 29 mmol/L 24  24  29  28    Glucose 70 - 110 mg/dL 185 High   155 High   208 High   214 High     BUN 8 - 23 mg/dL 16  15  20  12    Creatinine 0.5 - 1.4 mg/dL 0.9  0.8  1.0  0.9    Calcium 8.7 - 10.5 mg/dL 8.6 Low   9.2  9.3  9.2    Total Protein 6.0 - 8.4 g/dL 6.8  7.5  8.0     Albumin 3.5 - 5.2 g/dL 3.4 Low   3.6  3.7     Total Bilirubin 0.1 - 1.0 mg/dL 0.5  0.4 CM  0.4 CM         Alkaline Phosphatase 55 - 135 U/L 45 Low   58  57     AST 10 - 40 U/L 19  14  11     ALT 10 - 44 U/L 18  16  13     Anion Gap 8 - 16 mmol/L 11  12  11  10    eGFR if African American >60 mL/min/1.73 m^2 >60.0  >60.0  >60.0  >60.0    eGFR if non African American >60 mL/min/1.73 m^2 >60.0  >60.0 CM  59.3 Abnormal  CM  >60             Assessment:       1. Comprehensive diabetic foot examination, type 2 DM, encounter for    2. Type 2 diabetes mellitus with hyperglycemia, with long-term current use of insulin    3. Neuropathic pain of both feet    4. Muscle cramp - Right Foot    5. Foot callus          Plan:         CONTINUE CYCLOBENZAPRINE 1 AT BEDTIME        Comprehensive diabetic pedal exam performed  Reviewed results monofilament testing with patient and advised can fluctuate from visit to visit, is performed once a year and with decreased swelling in her feet, better skin texture she is feeling this in all areas.  Explained importance of continued care and maintenance of feet, skin and control of lower extremity swelling  Reviewed diabetic education  Discussed benefit of tight(er) control of glucose/diabetes regarding potential foot problems especially neuropathy.    Had a lengthy discussion regarding neuropathy, neuropathy pain, flare-ups and explained it is very common to experience cramping in feet and lower  legs with a history neuropathy  Instructed patient to start taking magnesium daily  Instructed and demonstrated with patient stretching exercises for feet and lower legs which need to be done each morning in each night  Continue muscle relaxer 1 at bed time  Reviewed appropriate shoes,  especially indoors to protect feet, no flat shoes, slippers or walking in sock or bare feet.    Discussed maintenance of skin and nails and potential complications.    Calluses debrided medial hallux bilateral  Nails debrided in thickness reduced, no areas of complication at this time  Reviewed need for daily foot checks and instructed patient to contact the office with any area of redness or swelling which has not improved within 3 days  Patient was in understanding and agreement with treatment plan.  I counseled the patient on their conditions, implications and medical management.  Instructed patient to contact the office with any changes, questions, concerns, worsening of symptoms.   Total face-to-face time, exam, assessment, treatment, discussion, documentation 30 minutes, more than half this time spent on consultation and coordination of care.  Follow up prn 3 months      This note was created using [employer] voice recognition software that occasionally misinterpreted phrases or words.

## 2023-04-04 ENCOUNTER — OFFICE VISIT (OUTPATIENT)
Dept: PODIATRY | Facility: CLINIC | Age: 68
End: 2023-04-04
Payer: MEDICARE

## 2023-04-04 VITALS
WEIGHT: 202 LBS | DIASTOLIC BLOOD PRESSURE: 64 MMHG | HEART RATE: 50 BPM | BODY MASS INDEX: 37.17 KG/M2 | RESPIRATION RATE: 16 BRPM | SYSTOLIC BLOOD PRESSURE: 109 MMHG | HEIGHT: 62 IN

## 2023-04-04 DIAGNOSIS — E11.65 TYPE 2 DIABETES MELLITUS WITH HYPERGLYCEMIA, WITH LONG-TERM CURRENT USE OF INSULIN: ICD-10-CM

## 2023-04-04 DIAGNOSIS — L84 FOOT CALLUS: ICD-10-CM

## 2023-04-04 DIAGNOSIS — R25.2 FOOT CRAMPS: ICD-10-CM

## 2023-04-04 DIAGNOSIS — B35.3 TINEA PEDIS OF BOTH FEET: ICD-10-CM

## 2023-04-04 DIAGNOSIS — G57.93 NEUROPATHIC PAIN OF BOTH FEET: Primary | ICD-10-CM

## 2023-04-04 DIAGNOSIS — Z79.4 TYPE 2 DIABETES MELLITUS WITH HYPERGLYCEMIA, WITH LONG-TERM CURRENT USE OF INSULIN: ICD-10-CM

## 2023-04-04 PROCEDURE — 1125F AMNT PAIN NOTED PAIN PRSNT: CPT | Mod: CPTII,S$GLB,, | Performed by: PODIATRIST

## 2023-04-04 PROCEDURE — 99214 OFFICE O/P EST MOD 30 MIN: CPT | Mod: S$GLB,,, | Performed by: PODIATRIST

## 2023-04-04 PROCEDURE — 1160F RVW MEDS BY RX/DR IN RCRD: CPT | Mod: CPTII,S$GLB,, | Performed by: PODIATRIST

## 2023-04-04 PROCEDURE — 99214 PR OFFICE/OUTPT VISIT, EST, LEVL IV, 30-39 MIN: ICD-10-PCS | Mod: S$GLB,,, | Performed by: PODIATRIST

## 2023-04-04 PROCEDURE — 3008F BODY MASS INDEX DOCD: CPT | Mod: CPTII,S$GLB,, | Performed by: PODIATRIST

## 2023-04-04 PROCEDURE — 3078F PR MOST RECENT DIASTOLIC BLOOD PRESSURE < 80 MM HG: ICD-10-PCS | Mod: CPTII,S$GLB,, | Performed by: PODIATRIST

## 2023-04-04 PROCEDURE — 3008F PR BODY MASS INDEX (BMI) DOCUMENTED: ICD-10-PCS | Mod: CPTII,S$GLB,, | Performed by: PODIATRIST

## 2023-04-04 PROCEDURE — 1159F MED LIST DOCD IN RCRD: CPT | Mod: CPTII,S$GLB,, | Performed by: PODIATRIST

## 2023-04-04 PROCEDURE — 3074F SYST BP LT 130 MM HG: CPT | Mod: CPTII,S$GLB,, | Performed by: PODIATRIST

## 2023-04-04 PROCEDURE — 1101F PR PT FALLS ASSESS DOC 0-1 FALLS W/OUT INJ PAST YR: ICD-10-PCS | Mod: CPTII,S$GLB,, | Performed by: PODIATRIST

## 2023-04-04 PROCEDURE — 1125F PR PAIN SEVERITY QUANTIFIED, PAIN PRESENT: ICD-10-PCS | Mod: CPTII,S$GLB,, | Performed by: PODIATRIST

## 2023-04-04 PROCEDURE — 3288F PR FALLS RISK ASSESSMENT DOCUMENTED: ICD-10-PCS | Mod: CPTII,S$GLB,, | Performed by: PODIATRIST

## 2023-04-04 PROCEDURE — 3288F FALL RISK ASSESSMENT DOCD: CPT | Mod: CPTII,S$GLB,, | Performed by: PODIATRIST

## 2023-04-04 PROCEDURE — 4010F ACE/ARB THERAPY RXD/TAKEN: CPT | Mod: CPTII,S$GLB,, | Performed by: PODIATRIST

## 2023-04-04 PROCEDURE — 1159F PR MEDICATION LIST DOCUMENTED IN MEDICAL RECORD: ICD-10-PCS | Mod: CPTII,S$GLB,, | Performed by: PODIATRIST

## 2023-04-04 PROCEDURE — 3078F DIAST BP <80 MM HG: CPT | Mod: CPTII,S$GLB,, | Performed by: PODIATRIST

## 2023-04-04 PROCEDURE — 1160F PR REVIEW ALL MEDS BY PRESCRIBER/CLIN PHARMACIST DOCUMENTED: ICD-10-PCS | Mod: CPTII,S$GLB,, | Performed by: PODIATRIST

## 2023-04-04 PROCEDURE — 99999 PR PBB SHADOW E&M-EST. PATIENT-LVL IV: CPT | Mod: PBBFAC,,, | Performed by: PODIATRIST

## 2023-04-04 PROCEDURE — 1101F PT FALLS ASSESS-DOCD LE1/YR: CPT | Mod: CPTII,S$GLB,, | Performed by: PODIATRIST

## 2023-04-04 PROCEDURE — 3074F PR MOST RECENT SYSTOLIC BLOOD PRESSURE < 130 MM HG: ICD-10-PCS | Mod: CPTII,S$GLB,, | Performed by: PODIATRIST

## 2023-04-04 PROCEDURE — 99999 PR PBB SHADOW E&M-EST. PATIENT-LVL IV: ICD-10-PCS | Mod: PBBFAC,,, | Performed by: PODIATRIST

## 2023-04-04 PROCEDURE — 4010F PR ACE/ARB THEARPY RXD/TAKEN: ICD-10-PCS | Mod: CPTII,S$GLB,, | Performed by: PODIATRIST

## 2023-04-04 RX ORDER — HYDROCHLOROTHIAZIDE 25 MG/1
25 TABLET ORAL DAILY
COMMUNITY
Start: 2023-01-10 | End: 2023-07-24

## 2023-04-04 RX ORDER — KETOCONAZOLE 20 MG/G
CREAM TOPICAL DAILY
Qty: 39 G | Refills: 2 | Status: SHIPPED | OUTPATIENT
Start: 2023-04-04 | End: 2023-05-04

## 2023-04-07 NOTE — PROGRESS NOTES
Subjective:       Patient ID: Syl Matos is a 67 y.o. female.    Chief Complaint: Follow-up (3 month )  Patient presents with family for follow-up chronic neuropathic pain feet, uncontrolled type 2 diabetes, foot cramps feet, right leg.  Patient relates she is been doing well, takes Lyrica for neuropathy 75 mg twice daily, muscle relaxer at bedtime as needed depending on foot cramps.  She states this still occurs in the right leg more than anywhere else.  Relates diabetes has not been well controlled.  Glucose was 191 at home this morning.  She is just starting to get established with endocrinology nurse practitioner Viv Phillips.  Pain 5/10         Past Medical History:   Diagnosis Date    Atrial fibrillation     CKD (chronic kidney disease)     COPD (chronic obstructive pulmonary disease)     Diabetes mellitus     Hypercholesteremia     Hypertension      Past Surgical History:   Procedure Laterality Date    FRACTURE SURGERY      pelvis surgery    SHOULDER SURGERY Left     TUBAL LIGATION       Family History   Problem Relation Age of Onset    Heart disease Mother     Breast cancer Mother     Heart disease Father      Social History     Socioeconomic History    Marital status:    Tobacco Use    Smoking status: Never    Smokeless tobacco: Never   Substance and Sexual Activity    Alcohol use: No    Drug use: No    Sexual activity: Not Currently     Partners: Male       Current Outpatient Medications   Medication Sig Dispense Refill    albuterol (PROVENTIL/VENTOLIN HFA) 90 mcg/actuation inhaler Inhale 1-2 puffs into the lungs every 6 (six) hours as needed for Wheezing or Shortness of Breath. Rescue 18 g 2    albuterol-ipratropium (DUO-NEB) 2.5 mg-0.5 mg/3 mL nebulizer solution USE 1 VIAL IN NEBULIZER THREE TIMES DAILY AS NEEDED      amLODIPine (NORVASC) 10 MG tablet 10 mg.      aspirin 81 MG Chew Take 81 mg by mouth once daily.      busPIRone (BUSPAR) 15 MG tablet Take 15 mg by mouth once daily.        carvediloL (COREG) 25 MG tablet Take 1 tablet (25 mg total) by mouth 2 (two) times daily with meals. 60 tablet 11    cholecalciferol, vitamin D3, (VITAMIN D3) 50 mcg (2,000 unit) Tab Take 1 tablet (2,000 Units total) by mouth once daily.      cyclobenzaprine (FLEXERIL) 10 MG tablet TAKE 1 TABLET(10 MG) BY MOUTH EVERY EVENING 30 tablet 2    EScitalopram oxalate (LEXAPRO) 20 MG tablet Take 20 mg by mouth every morning.      glimepiride (AMARYL) 4 MG tablet Take 4 mg by mouth.      LANTUS U-100 INSULIN 100 unit/mL injection SMARTSI Unit(s) SUB-Q Daily      lisinopriL (PRINIVIL,ZESTRIL) 20 MG tablet 20 mg.      loratadine (CLARITIN) 10 mg tablet   0    metFORMIN (GLUCOPHAGE) 1000 MG tablet Take 1,000 mg by mouth 2 (two) times daily.      metoprolol tartrate (LOPRESSOR) 50 MG tablet 50 mg.      nitroGLYCERIN (NITROSTAT) 0.4 MG SL tablet Place under the tongue.      ondansetron (ZOFRAN-ODT) 4 MG TbDL DISSOLVE ONE TABLET BY MOUTH THREE TIMES DAILY AS NEEDED FORNAUSEA AND VOMITING      pravastatin (PRAVACHOL) 20 MG tablet Take 20 mg by mouth once daily.      pregabalin (LYRICA) 75 MG capsule Take 75 mg by mouth 2 (two) times daily.      RYNEX DM 1-2.5-5 mg/5 mL Soln Take 10 mLs by mouth.      simvastatin (ZOCOR) 40 MG tablet TAKE 1 TABLET BY MOUTH EVERY MORNING. 90 tablet 2    SITagliptin (JANUVIA) 100 MG Tab Take 100 mg by mouth once daily.      traZODone (DESYREL) 50 MG tablet Take 50 mg by mouth nightly.      hydroCHLOROthiazide (HYDRODIURIL) 25 MG tablet Take 25 mg by mouth once daily.      insulin lispro 100 unit/mL injection Inject 14 Units into the skin 3 (three) times daily with meals. 12.6 mL 11    ketoconazole (NIZORAL) 2 % cream Apply topically once daily. 39 g 2    omega-3 fatty acids 1,000 mg Cap Take 2 capsules (2,000 mg total) by mouth 2 (two) times daily. Take as directed for triglycerides and to decrease risk of heart disease and stroke.  0     No current facility-administered medications for this  "visit.     Review of patient's allergies indicates:   Allergen Reactions    Iodine and iodide containing products Hives       Review of Systems   HENT:  Negative for congestion.    Respiratory:  Negative for cough.    Cardiovascular:  Positive for leg swelling.        Mild lower leg edema    Endocrine:        DM 2 x 25 yrs   Musculoskeletal:  Negative for gait problem.   All other systems reviewed and are negative.    Objective:      Vitals:    04/04/23 1136   BP: 109/64   BP Location: Left arm   Patient Position: Sitting   BP Method: Medium (Automatic)   Pulse: (!) 50   Resp: 16   Weight: 91.6 kg (202 lb)   Height: 5' 2" (1.575 m)     Physical Exam  Vitals and nursing note reviewed.   Constitutional:       General: She is not in acute distress.     Appearance: Normal appearance.   Cardiovascular:      Pulses:           Dorsalis pedis pulses are 2+ on the right side and 2+ on the left side.        Posterior tibial pulses are 1+ on the right side and 1+ on the left side.   Pulmonary:      Effort: Pulmonary effort is normal.   Musculoskeletal:         General: No deformity. Normal range of motion.      Right foot: No deformity.      Left foot: No deformity.   Feet:      Right foot:      Skin integrity: Callus and dry skin present.      Toenail Condition: Right toenails are long.      Left foot:      Skin integrity: Callus (mild, improved hyperkeratotic tissue medial b/l hallux) and dry skin (Increased dry skin especially heels with mild erythema consistent with tinea pedis, no skin breaks) present.      Toenail Condition: Left toenails are long.   Skin:     General: Skin is dry.      Capillary Refill: Capillary refill takes 2 to 3 seconds.      Comments: Feet, heels   Neurological:      General: No focal deficit present.      Mental Status: She is alert.      Comments: Painful paresthesias, neuropathic foot pain   Psychiatric:         Mood and Affect: Mood normal.         Behavior: Behavior normal.         Thought " Content: Thought content normal.         Judgment: Judgment normal.            Assessment:       1. Neuropathic pain of both feet    2. Type 2 diabetes mellitus with hyperglycemia, with long-term current use of insulin    3. Foot cramps    4. Foot callus    5. Tinea pedis of both feet          Plan:         KETOCONAZOLE 2% APPLY TWICE DAILY TO FEET X2 WEEKS      Reviewed diabetic education  Advised patient better control of daily glucose/A1c/diabetes can significantly reduce neuropathic pain in feet and highly establishing care with nurse practitioner for care of her diabetes.  We reviewed neuropathic pain, flare-ups of cramping in feet, right lower leg and the need for stretching.  Advised patient this is to be done throughout the day and evening at times when feet and legs are more flexible such as immediately after walking.  The more often this is performed, few times daily for a few weeks, the less likely cramping will occur.  Can continue Flexeril 1 at bedtime as needed  Reviewed appropriate shoes,  especially indoors to protect feet, no flat shoes, slippers or walking in sock or bare feet.    Discussed maintenance of skin and nails and potential complications.    Calluses debrided medial hallux bilateral  Nails debrided in thickness reduced, no areas of complication at this time  Reviewed need for daily foot checks and instructed patient to contact the office with any area of redness or swelling which has not improved within 3 days  Patient was in understanding and agreement with treatment plan.  I counseled the patient on their conditions, implications and medical management.  Instructed patient/family to contact the office with any changes, questions, concerns, worsening of symptoms.   Total face to face time 30 minutes, exam, assessment, treatment, discussion, additional time for review of chart prior to and following appointment and visit documentation, consultation and coordination of care.   Follow up 3  months    This note was created using Instagram voice recognition software that occasionally misinterpreted phrases or words.

## 2023-05-03 LAB
LEFT EYE DM RETINOPATHY: NEGATIVE
RIGHT EYE DM RETINOPATHY: NEGATIVE

## 2023-06-13 ENCOUNTER — OFFICE VISIT (OUTPATIENT)
Dept: UROLOGY | Facility: CLINIC | Age: 68
End: 2023-06-13
Payer: MEDICARE

## 2023-06-13 VITALS
HEART RATE: 60 BPM | BODY MASS INDEX: 35.83 KG/M2 | WEIGHT: 194.69 LBS | DIASTOLIC BLOOD PRESSURE: 70 MMHG | SYSTOLIC BLOOD PRESSURE: 109 MMHG | HEIGHT: 62 IN

## 2023-06-13 DIAGNOSIS — Z79.4 TYPE 2 DIABETES MELLITUS WITH HYPERGLYCEMIA, WITH LONG-TERM CURRENT USE OF INSULIN: ICD-10-CM

## 2023-06-13 DIAGNOSIS — E11.65 TYPE 2 DIABETES MELLITUS WITH HYPERGLYCEMIA, WITH LONG-TERM CURRENT USE OF INSULIN: ICD-10-CM

## 2023-06-13 DIAGNOSIS — R32 URINARY INCONTINENCE, UNSPECIFIED TYPE: Primary | ICD-10-CM

## 2023-06-13 LAB
BILIRUBIN, UA POC OHS: NEGATIVE
BLOOD, UA POC OHS: NEGATIVE
CLARITY, UA POC OHS: CLEAR
COLOR, UA POC OHS: YELLOW
GLUCOSE, UA POC OHS: 500
KETONES, UA POC OHS: NEGATIVE
LEUKOCYTES, UA POC OHS: NEGATIVE
NITRITE, UA POC OHS: NEGATIVE
PH, UA POC OHS: 5
POC RESIDUAL URINE VOLUME: 0 ML (ref 0–100)
PROTEIN, UA POC OHS: NEGATIVE
SPECIFIC GRAVITY, UA POC OHS: 1.02
UROBILINOGEN, UA POC OHS: 0.2

## 2023-06-13 PROCEDURE — 99999 PR PBB SHADOW E&M-EST. PATIENT-LVL V: CPT | Mod: PBBFAC,,, | Performed by: NURSE PRACTITIONER

## 2023-06-13 PROCEDURE — 3074F PR MOST RECENT SYSTOLIC BLOOD PRESSURE < 130 MM HG: ICD-10-PCS | Mod: CPTII,S$GLB,, | Performed by: NURSE PRACTITIONER

## 2023-06-13 PROCEDURE — 51798 POCT BLADDER SCAN: ICD-10-PCS | Mod: S$GLB,,, | Performed by: NURSE PRACTITIONER

## 2023-06-13 PROCEDURE — 3008F PR BODY MASS INDEX (BMI) DOCUMENTED: ICD-10-PCS | Mod: CPTII,S$GLB,, | Performed by: NURSE PRACTITIONER

## 2023-06-13 PROCEDURE — 81003 URINALYSIS AUTO W/O SCOPE: CPT | Mod: QW,S$GLB,, | Performed by: NURSE PRACTITIONER

## 2023-06-13 PROCEDURE — 81003 POCT URINALYSIS(INSTRUMENT): ICD-10-PCS | Mod: QW,S$GLB,, | Performed by: NURSE PRACTITIONER

## 2023-06-13 PROCEDURE — 1160F RVW MEDS BY RX/DR IN RCRD: CPT | Mod: CPTII,S$GLB,, | Performed by: NURSE PRACTITIONER

## 2023-06-13 PROCEDURE — 1159F MED LIST DOCD IN RCRD: CPT | Mod: CPTII,S$GLB,, | Performed by: NURSE PRACTITIONER

## 2023-06-13 PROCEDURE — 1101F PT FALLS ASSESS-DOCD LE1/YR: CPT | Mod: CPTII,S$GLB,, | Performed by: NURSE PRACTITIONER

## 2023-06-13 PROCEDURE — 99204 OFFICE O/P NEW MOD 45 MIN: CPT | Mod: S$GLB,,, | Performed by: NURSE PRACTITIONER

## 2023-06-13 PROCEDURE — 51798 US URINE CAPACITY MEASURE: CPT | Mod: S$GLB,,, | Performed by: NURSE PRACTITIONER

## 2023-06-13 PROCEDURE — 4010F ACE/ARB THERAPY RXD/TAKEN: CPT | Mod: CPTII,S$GLB,, | Performed by: NURSE PRACTITIONER

## 2023-06-13 PROCEDURE — 99999 PR PBB SHADOW E&M-EST. PATIENT-LVL V: ICD-10-PCS | Mod: PBBFAC,,, | Performed by: NURSE PRACTITIONER

## 2023-06-13 PROCEDURE — 99204 PR OFFICE/OUTPT VISIT, NEW, LEVL IV, 45-59 MIN: ICD-10-PCS | Mod: S$GLB,,, | Performed by: NURSE PRACTITIONER

## 2023-06-13 PROCEDURE — 3074F SYST BP LT 130 MM HG: CPT | Mod: CPTII,S$GLB,, | Performed by: NURSE PRACTITIONER

## 2023-06-13 PROCEDURE — 4010F PR ACE/ARB THEARPY RXD/TAKEN: ICD-10-PCS | Mod: CPTII,S$GLB,, | Performed by: NURSE PRACTITIONER

## 2023-06-13 PROCEDURE — 1101F PR PT FALLS ASSESS DOC 0-1 FALLS W/OUT INJ PAST YR: ICD-10-PCS | Mod: CPTII,S$GLB,, | Performed by: NURSE PRACTITIONER

## 2023-06-13 PROCEDURE — 3078F DIAST BP <80 MM HG: CPT | Mod: CPTII,S$GLB,, | Performed by: NURSE PRACTITIONER

## 2023-06-13 PROCEDURE — 3288F PR FALLS RISK ASSESSMENT DOCUMENTED: ICD-10-PCS | Mod: CPTII,S$GLB,, | Performed by: NURSE PRACTITIONER

## 2023-06-13 PROCEDURE — 3008F BODY MASS INDEX DOCD: CPT | Mod: CPTII,S$GLB,, | Performed by: NURSE PRACTITIONER

## 2023-06-13 PROCEDURE — 3078F PR MOST RECENT DIASTOLIC BLOOD PRESSURE < 80 MM HG: ICD-10-PCS | Mod: CPTII,S$GLB,, | Performed by: NURSE PRACTITIONER

## 2023-06-13 PROCEDURE — 1160F PR REVIEW ALL MEDS BY PRESCRIBER/CLIN PHARMACIST DOCUMENTED: ICD-10-PCS | Mod: CPTII,S$GLB,, | Performed by: NURSE PRACTITIONER

## 2023-06-13 PROCEDURE — 3288F FALL RISK ASSESSMENT DOCD: CPT | Mod: CPTII,S$GLB,, | Performed by: NURSE PRACTITIONER

## 2023-06-13 PROCEDURE — 1159F PR MEDICATION LIST DOCUMENTED IN MEDICAL RECORD: ICD-10-PCS | Mod: CPTII,S$GLB,, | Performed by: NURSE PRACTITIONER

## 2023-06-13 RX ORDER — OXYBUTYNIN CHLORIDE 10 MG/1
10 TABLET, EXTENDED RELEASE ORAL DAILY
Qty: 30 TABLET | Refills: 12 | Status: SHIPPED | OUTPATIENT
Start: 2023-06-13 | End: 2023-08-02 | Stop reason: SDUPTHER

## 2023-06-13 NOTE — PROGRESS NOTES
[facility] Note  Staff: MARY ShermanP-C    PCP: HARVEY Diaz    Chief Complaint: Urinary incontinence    Subjective:        HPI: Syl Matos is a 67 y.o. female NEW PT presents for evaluation of mixed incontinence of urination.    Pt states in office today she has been having incontinence for many years.  She describes her LUTS as urgency and no control, but also leakage with certain movements, laughing, coughing.    UA in office today showed 500 of Glucose, WNL for all other findings.  PVR by bladder scan today was 0 mL  Nocturia is 1-2x nightly.    No family hx of  Cancers  No gross hematuria  No hx of Tobacco use  Hx of 1 episode of kidney stones  G5, P4-Vaginal and C/Section deliveries    REVIEW OF SYSTEMS:  A comprehensive 10 system review was performed and is negative except as noted above in HPI    PMHx:  Past Medical History:   Diagnosis Date    Atrial fibrillation     CKD (chronic kidney disease)     COPD (chronic obstructive pulmonary disease)     Diabetes mellitus     Hypercholesteremia     Hypertension      PSHx:  Past Surgical History:   Procedure Laterality Date    FRACTURE SURGERY      pelvis surgery    SHOULDER SURGERY Left     TUBAL LIGATION       Allergies:  Iodine and iodide containing products    Medications: reviewed   Objective:     Vitals:    06/13/23 1405   BP: 109/70   Pulse: 60     Physical Exam  Vitals reviewed.   Constitutional:       Appearance: She is well-developed.   HENT:      Head: Normocephalic and atraumatic.   Eyes:      Conjunctiva/sclera: Conjunctivae normal.      Pupils: Pupils are equal, round, and reactive to light.   Cardiovascular:      Rate and Rhythm: Normal rate and regular rhythm.      Heart sounds: Normal heart sounds.   Pulmonary:      Effort: Pulmonary effort is normal.      Breath sounds: Normal breath sounds.   Abdominal:      General: Bowel sounds are normal.      Palpations: Abdomen is soft.   Musculoskeletal:          General: Normal range of motion.      Cervical back: Normal range of motion and neck supple.   Skin:     General: Skin is warm and dry.   Neurological:      Mental Status: She is alert and oriented to person, place, and time.      Deep Tendon Reflexes: Reflexes are normal and symmetric.   Psychiatric:         Behavior: Behavior normal.         Thought Content: Thought content normal.         Judgment: Judgment normal.         Assessment:       1. Urinary incontinence, unspecified type    2. Type 2 diabetes mellitus with hyperglycemia, with long-term current use of insulin          Plan:     Oxybutynin XL 10 mg daily prescribed pt today in office for LUTS.  Discussed with pt today the affect Diabetes with incontinence has together.  Start bladder training every 2.5-3 hrs.  Discussed conservative measures to control urgency and frequency including avoiding bladder irritants, timed voiding, not postponing voiding, and bowel regimen (as distended bowel has extrinsic compressive effect on bladder. Discussed bladder irritants include coffe (even decaf), tea, alcohol, soda, spicy foods, acidic juices (orange, tomato), vinegar, and artificial sweeteners.     F/u with me in one month to recheck LUTS with new medication.  Pt and family verbalized understanding at this time.    [facility]: Active    Yee Castellanos, JOO

## 2023-07-03 ENCOUNTER — HOSPITAL ENCOUNTER (OUTPATIENT)
Dept: RADIOLOGY | Facility: HOSPITAL | Age: 68
Discharge: HOME | End: 2023-07-03
Attending: FAMILY MEDICINE
Payer: MEDICARE

## 2023-07-03 DIAGNOSIS — Z12.31 ENCOUNTER FOR SCREENING MAMMOGRAM FOR BREAST CANCER: ICD-10-CM

## 2023-07-03 PROCEDURE — 77067 SCR MAMMO BI INCL CAD: CPT | Mod: 26,,, | Performed by: RADIOLOGY

## 2023-07-03 PROCEDURE — 77063 MAMMO DIGITAL SCREENING BILAT WITH TOMO: ICD-10-PCS | Mod: 26,,, | Performed by: RADIOLOGY

## 2023-07-03 PROCEDURE — 77063 BREAST TOMOSYNTHESIS BI: CPT | Mod: 26,,, | Performed by: RADIOLOGY

## 2023-07-03 PROCEDURE — 77067 SCR MAMMO BI INCL CAD: CPT | Mod: TC

## 2023-07-03 PROCEDURE — 77067 MAMMO DIGITAL SCREENING BILAT WITH TOMO: ICD-10-PCS | Mod: 26,,, | Performed by: RADIOLOGY

## 2023-07-13 ENCOUNTER — TELEPHONE (OUTPATIENT)
Dept: FAMILY MEDICINE | Facility: CLINIC | Age: 68
End: 2023-07-13
Payer: MEDICARE

## 2023-07-13 NOTE — TELEPHONE ENCOUNTER
----- Message from Camila Erazo sent at 7/13/2023  3:49 PM CDT -----  Contact: [facility]  Type:  Needs Medical Advice    Who Called: Dejon Mckee [facility]  Symptoms (please be specific): Paperwork was faxed on behalf of the pt Diabetic Medical Supplies, Certified Medical Necessity Form    Best Call Back Number: Rylee, [phone], Fax [phone]  Additional Information: Testing supplies (Lancet, strips and lancing device)    Please contact Rylee to complete form necessary for pt DME supplies...   Thank you..

## 2023-07-18 ENCOUNTER — OFFICE VISIT (OUTPATIENT)
Dept: PODIATRY | Facility: CLINIC | Age: 68
End: 2023-07-18
Payer: MEDICARE

## 2023-07-18 VITALS
HEIGHT: 62 IN | WEIGHT: 196 LBS | BODY MASS INDEX: 36.07 KG/M2 | SYSTOLIC BLOOD PRESSURE: 105 MMHG | DIASTOLIC BLOOD PRESSURE: 62 MMHG | RESPIRATION RATE: 16 BRPM | HEART RATE: 64 BPM

## 2023-07-18 DIAGNOSIS — Z79.4 TYPE 2 DIABETES MELLITUS WITH HYPERGLYCEMIA, WITH LONG-TERM CURRENT USE OF INSULIN: ICD-10-CM

## 2023-07-18 DIAGNOSIS — G57.93 NEUROPATHIC PAIN OF BOTH FEET: Primary | ICD-10-CM

## 2023-07-18 DIAGNOSIS — R25.2 FOOT CRAMPS: ICD-10-CM

## 2023-07-18 DIAGNOSIS — E11.65 TYPE 2 DIABETES MELLITUS WITH HYPERGLYCEMIA, WITH LONG-TERM CURRENT USE OF INSULIN: ICD-10-CM

## 2023-07-18 DIAGNOSIS — E11.42 TYPE 2 DIABETES MELLITUS WITH PERIPHERAL NEUROPATHY: ICD-10-CM

## 2023-07-18 PROCEDURE — 3288F FALL RISK ASSESSMENT DOCD: CPT | Mod: CPTII,S$GLB,, | Performed by: PODIATRIST

## 2023-07-18 PROCEDURE — 4010F ACE/ARB THERAPY RXD/TAKEN: CPT | Mod: CPTII,S$GLB,, | Performed by: PODIATRIST

## 2023-07-18 PROCEDURE — 1126F PR PAIN SEVERITY QUANTIFIED, NO PAIN PRESENT: ICD-10-PCS | Mod: CPTII,S$GLB,, | Performed by: PODIATRIST

## 2023-07-18 PROCEDURE — 99214 PR OFFICE/OUTPT VISIT, EST, LEVL IV, 30-39 MIN: ICD-10-PCS | Mod: S$GLB,,, | Performed by: PODIATRIST

## 2023-07-18 PROCEDURE — 3288F PR FALLS RISK ASSESSMENT DOCUMENTED: ICD-10-PCS | Mod: CPTII,S$GLB,, | Performed by: PODIATRIST

## 2023-07-18 PROCEDURE — 3074F SYST BP LT 130 MM HG: CPT | Mod: CPTII,S$GLB,, | Performed by: PODIATRIST

## 2023-07-18 PROCEDURE — 1159F PR MEDICATION LIST DOCUMENTED IN MEDICAL RECORD: ICD-10-PCS | Mod: CPTII,S$GLB,, | Performed by: PODIATRIST

## 2023-07-18 PROCEDURE — 1160F RVW MEDS BY RX/DR IN RCRD: CPT | Mod: CPTII,S$GLB,, | Performed by: PODIATRIST

## 2023-07-18 PROCEDURE — 1101F PR PT FALLS ASSESS DOC 0-1 FALLS W/OUT INJ PAST YR: ICD-10-PCS | Mod: CPTII,S$GLB,, | Performed by: PODIATRIST

## 2023-07-18 PROCEDURE — 3078F DIAST BP <80 MM HG: CPT | Mod: CPTII,S$GLB,, | Performed by: PODIATRIST

## 2023-07-18 PROCEDURE — 1159F MED LIST DOCD IN RCRD: CPT | Mod: CPTII,S$GLB,, | Performed by: PODIATRIST

## 2023-07-18 PROCEDURE — 99999 PR PBB SHADOW E&M-EST. PATIENT-LVL V: CPT | Mod: PBBFAC,,, | Performed by: PODIATRIST

## 2023-07-18 PROCEDURE — 1160F PR REVIEW ALL MEDS BY PRESCRIBER/CLIN PHARMACIST DOCUMENTED: ICD-10-PCS | Mod: CPTII,S$GLB,, | Performed by: PODIATRIST

## 2023-07-18 PROCEDURE — 4010F PR ACE/ARB THEARPY RXD/TAKEN: ICD-10-PCS | Mod: CPTII,S$GLB,, | Performed by: PODIATRIST

## 2023-07-18 PROCEDURE — 1126F AMNT PAIN NOTED NONE PRSNT: CPT | Mod: CPTII,S$GLB,, | Performed by: PODIATRIST

## 2023-07-18 PROCEDURE — 3008F BODY MASS INDEX DOCD: CPT | Mod: CPTII,S$GLB,, | Performed by: PODIATRIST

## 2023-07-18 PROCEDURE — 1101F PT FALLS ASSESS-DOCD LE1/YR: CPT | Mod: CPTII,S$GLB,, | Performed by: PODIATRIST

## 2023-07-18 PROCEDURE — 3008F PR BODY MASS INDEX (BMI) DOCUMENTED: ICD-10-PCS | Mod: CPTII,S$GLB,, | Performed by: PODIATRIST

## 2023-07-18 PROCEDURE — 3074F PR MOST RECENT SYSTOLIC BLOOD PRESSURE < 130 MM HG: ICD-10-PCS | Mod: CPTII,S$GLB,, | Performed by: PODIATRIST

## 2023-07-18 PROCEDURE — 99999 PR PBB SHADOW E&M-EST. PATIENT-LVL V: ICD-10-PCS | Mod: PBBFAC,,, | Performed by: PODIATRIST

## 2023-07-18 PROCEDURE — 99214 OFFICE O/P EST MOD 30 MIN: CPT | Mod: S$GLB,,, | Performed by: PODIATRIST

## 2023-07-18 PROCEDURE — 3078F PR MOST RECENT DIASTOLIC BLOOD PRESSURE < 80 MM HG: ICD-10-PCS | Mod: CPTII,S$GLB,, | Performed by: PODIATRIST

## 2023-07-18 RX ORDER — FLUCONAZOLE 100 MG/1
TABLET ORAL
COMMUNITY
Start: 2023-05-25

## 2023-07-18 RX ORDER — DIPHENOXYLATE HYDROCHLORIDE AND ATROPINE SULFATE 2.5; .025 MG/1; MG/1
TABLET ORAL
COMMUNITY
Start: 2023-02-26 | End: 2023-07-24

## 2023-07-18 RX ORDER — INSULIN GLARGINE 100 [IU]/ML
INJECTION, SOLUTION SUBCUTANEOUS
COMMUNITY
Start: 2023-06-22 | End: 2023-07-24

## 2023-07-18 RX ORDER — TRAZODONE HYDROCHLORIDE 100 MG/1
100 TABLET ORAL NIGHTLY
COMMUNITY
Start: 2023-06-20

## 2023-07-18 RX ORDER — CYCLOBENZAPRINE HCL 10 MG
10 TABLET ORAL NIGHTLY
Qty: 30 TABLET | Refills: 2 | Status: SHIPPED | OUTPATIENT
Start: 2023-07-18 | End: 2023-08-17

## 2023-07-18 RX ORDER — LANCETS
EACH MISCELLANEOUS
COMMUNITY
Start: 2023-07-08

## 2023-07-18 RX ORDER — FLUTICASONE PROPIONATE AND SALMETEROL 250; 50 UG/1; UG/1
POWDER RESPIRATORY (INHALATION)
COMMUNITY
Start: 2023-06-09

## 2023-07-18 RX ORDER — BLOOD SUGAR DIAGNOSTIC
STRIP MISCELLANEOUS 3 TIMES DAILY
COMMUNITY
Start: 2023-06-08

## 2023-08-02 ENCOUNTER — OFFICE VISIT (OUTPATIENT)
Dept: UROLOGY | Facility: CLINIC | Age: 68
End: 2023-08-02
Payer: MEDICARE

## 2023-08-02 DIAGNOSIS — R32 URINARY INCONTINENCE, UNSPECIFIED TYPE: Primary | ICD-10-CM

## 2023-08-02 DIAGNOSIS — E11.65 TYPE 2 DIABETES MELLITUS WITH HYPERGLYCEMIA, WITH LONG-TERM CURRENT USE OF INSULIN: ICD-10-CM

## 2023-08-02 DIAGNOSIS — Z79.4 TYPE 2 DIABETES MELLITUS WITH HYPERGLYCEMIA, WITH LONG-TERM CURRENT USE OF INSULIN: ICD-10-CM

## 2023-08-02 PROCEDURE — 1101F PT FALLS ASSESS-DOCD LE1/YR: CPT | Mod: CPTII,S$GLB,, | Performed by: NURSE PRACTITIONER

## 2023-08-02 PROCEDURE — 1101F PR PT FALLS ASSESS DOC 0-1 FALLS W/OUT INJ PAST YR: ICD-10-PCS | Mod: CPTII,S$GLB,, | Performed by: NURSE PRACTITIONER

## 2023-08-02 PROCEDURE — 99999 PR PBB SHADOW E&M-EST. PATIENT-LVL III: CPT | Mod: PBBFAC,,, | Performed by: NURSE PRACTITIONER

## 2023-08-02 PROCEDURE — 4010F PR ACE/ARB THEARPY RXD/TAKEN: ICD-10-PCS | Mod: CPTII,S$GLB,, | Performed by: NURSE PRACTITIONER

## 2023-08-02 PROCEDURE — 1160F RVW MEDS BY RX/DR IN RCRD: CPT | Mod: CPTII,S$GLB,, | Performed by: NURSE PRACTITIONER

## 2023-08-02 PROCEDURE — 3288F PR FALLS RISK ASSESSMENT DOCUMENTED: ICD-10-PCS | Mod: CPTII,S$GLB,, | Performed by: NURSE PRACTITIONER

## 2023-08-02 PROCEDURE — 99999 PR PBB SHADOW E&M-EST. PATIENT-LVL III: ICD-10-PCS | Mod: PBBFAC,,, | Performed by: NURSE PRACTITIONER

## 2023-08-02 PROCEDURE — 99213 OFFICE O/P EST LOW 20 MIN: CPT | Mod: S$GLB,,, | Performed by: NURSE PRACTITIONER

## 2023-08-02 PROCEDURE — 4010F ACE/ARB THERAPY RXD/TAKEN: CPT | Mod: CPTII,S$GLB,, | Performed by: NURSE PRACTITIONER

## 2023-08-02 PROCEDURE — 99213 PR OFFICE/OUTPT VISIT, EST, LEVL III, 20-29 MIN: ICD-10-PCS | Mod: S$GLB,,, | Performed by: NURSE PRACTITIONER

## 2023-08-02 PROCEDURE — 3288F FALL RISK ASSESSMENT DOCD: CPT | Mod: CPTII,S$GLB,, | Performed by: NURSE PRACTITIONER

## 2023-08-02 PROCEDURE — 1160F PR REVIEW ALL MEDS BY PRESCRIBER/CLIN PHARMACIST DOCUMENTED: ICD-10-PCS | Mod: CPTII,S$GLB,, | Performed by: NURSE PRACTITIONER

## 2023-08-02 PROCEDURE — 1159F PR MEDICATION LIST DOCUMENTED IN MEDICAL RECORD: ICD-10-PCS | Mod: CPTII,S$GLB,, | Performed by: NURSE PRACTITIONER

## 2023-08-02 PROCEDURE — 1159F MED LIST DOCD IN RCRD: CPT | Mod: CPTII,S$GLB,, | Performed by: NURSE PRACTITIONER

## 2023-08-02 RX ORDER — OXYBUTYNIN CHLORIDE 10 MG/1
10 TABLET, EXTENDED RELEASE ORAL DAILY
Qty: 90 TABLET | Refills: 3 | Status: SHIPPED | OUTPATIENT
Start: 2023-08-02 | End: 2023-10-31

## 2023-08-02 NOTE — PROGRESS NOTES
[facility] Note  Staff: MARY ShermanP-C    PCP: MD Emily    Chief Complaint: F/UP-OAB    Subjective:        HPI: Syl Matos is a 68 y.o. female presents today for routine recheck of her symptoms at this time.  She is accompanied by family member during office visit today.    Pt presented as NP on 6/13/23 for evaluation of mixed incontinence of urine at this time.  After last ov with me, pt was started on Oxybutynin XL 10 mg daily for her LUTS.  She presents today for f/up and recheck to see how she is doing on medication.    Today, pt states overall med doing very well for her with no complaints voiced at this time.  Pt denies gross hematuria and dysuria.  Pt's incontinence has improved by over 50%.    OV 6/13/23:  UA in office today showed 500 of Glucose, WNL for all other findings.  PVR by bladder scan today was 0 mL  Nocturia is 1-2x nightly.     No family hx of  Cancers  No gross hematuria  No hx of Tobacco use  Hx of 1 episode of kidney stones  G5, P4-Vaginal and C/Section deliveries    REVIEW OF SYSTEMS:  A comprehensive 10 system review was performed and is negative except as noted above in HPI    PMHx:  Past Medical History:   Diagnosis Date    Atrial fibrillation     CKD (chronic kidney disease)     COPD (chronic obstructive pulmonary disease)     Diabetes mellitus     Hypercholesteremia     Hypertension      PSHx:  Past Surgical History:   Procedure Laterality Date    FRACTURE SURGERY      pelvis surgery    SHOULDER SURGERY Left     TUBAL LIGATION       Allergies:  Iodine and iodide containing products    Medications: reviewed   Objective:   There were no vitals filed for this visit.    Physical Exam  Constitutional:       Appearance: She is well-developed.   HENT:      Head: Normocephalic and atraumatic.   Eyes:      Conjunctiva/sclera: Conjunctivae normal.      Pupils: Pupils are equal, round, and reactive to light.   Cardiovascular:      Rate and Rhythm: Normal  rate and regular rhythm.      Heart sounds: Normal heart sounds.   Pulmonary:      Effort: Pulmonary effort is normal.      Breath sounds: Normal breath sounds.   Abdominal:      General: Bowel sounds are normal.      Palpations: Abdomen is soft.   Musculoskeletal:         General: Normal range of motion.      Cervical back: Normal range of motion and neck supple.   Skin:     General: Skin is warm and dry.   Neurological:      Mental Status: She is alert and oriented to person, place, and time.      Deep Tendon Reflexes: Reflexes are normal and symmetric.   Psychiatric:         Behavior: Behavior normal.         Thought Content: Thought content normal.         Judgment: Judgment normal.       Assessment:       1. Urinary incontinence, unspecified type    2. Type 2 diabetes mellitus with hyperglycemia, with long-term current use of insulin          Plan:      Continue Oxybutynin XL 10 mg daily as previously prescribed.  Continue Bladder training daily.  Discussed conservative measures to control urgency and frequency including avoiding bladder irritants, timed voiding, not postponing voiding, and bowel regimen (as distended bowel has extrinsic compressive effect on bladder. Discussed bladder irritants include coffe (even decaf), tea, alcohol, soda, spicy foods, acidic juices (orange, tomato), vinegar, and artificial sweeteners.     F/u with me in six months for recheck with U[facility].    [facility]: Active    Yee Castellanos, JOO

## 2023-10-17 ENCOUNTER — OFFICE VISIT (OUTPATIENT)
Dept: PODIATRY | Facility: CLINIC | Age: 68
End: 2023-10-17
Payer: MEDICARE

## 2023-10-17 VITALS
BODY MASS INDEX: 35.33 KG/M2 | WEIGHT: 192 LBS | DIASTOLIC BLOOD PRESSURE: 82 MMHG | SYSTOLIC BLOOD PRESSURE: 136 MMHG | HEART RATE: 61 BPM | HEIGHT: 62 IN | RESPIRATION RATE: 18 BRPM

## 2023-10-17 DIAGNOSIS — G57.93 NEUROPATHIC PAIN OF BOTH FEET: ICD-10-CM

## 2023-10-17 DIAGNOSIS — L84 FOOT CALLUS: ICD-10-CM

## 2023-10-17 DIAGNOSIS — E11.42 TYPE 2 DIABETES MELLITUS WITH PERIPHERAL NEUROPATHY: Primary | ICD-10-CM

## 2023-10-17 DIAGNOSIS — R60.0 PEDAL EDEMA: ICD-10-CM

## 2023-10-17 PROCEDURE — 1159F PR MEDICATION LIST DOCUMENTED IN MEDICAL RECORD: ICD-10-PCS | Mod: CPTII,S$GLB,, | Performed by: PODIATRIST

## 2023-10-17 PROCEDURE — 3008F BODY MASS INDEX DOCD: CPT | Mod: CPTII,S$GLB,, | Performed by: PODIATRIST

## 2023-10-17 PROCEDURE — 1125F AMNT PAIN NOTED PAIN PRSNT: CPT | Mod: CPTII,S$GLB,, | Performed by: PODIATRIST

## 2023-10-17 PROCEDURE — 3075F PR MOST RECENT SYSTOLIC BLOOD PRESS GE 130-139MM HG: ICD-10-PCS | Mod: CPTII,S$GLB,, | Performed by: PODIATRIST

## 2023-10-17 PROCEDURE — 3079F DIAST BP 80-89 MM HG: CPT | Mod: CPTII,S$GLB,, | Performed by: PODIATRIST

## 2023-10-17 PROCEDURE — 3075F SYST BP GE 130 - 139MM HG: CPT | Mod: CPTII,S$GLB,, | Performed by: PODIATRIST

## 2023-10-17 PROCEDURE — 3288F PR FALLS RISK ASSESSMENT DOCUMENTED: ICD-10-PCS | Mod: CPTII,S$GLB,, | Performed by: PODIATRIST

## 2023-10-17 PROCEDURE — 1160F RVW MEDS BY RX/DR IN RCRD: CPT | Mod: CPTII,S$GLB,, | Performed by: PODIATRIST

## 2023-10-17 PROCEDURE — 3079F PR MOST RECENT DIASTOLIC BLOOD PRESSURE 80-89 MM HG: ICD-10-PCS | Mod: CPTII,S$GLB,, | Performed by: PODIATRIST

## 2023-10-17 PROCEDURE — 99213 PR OFFICE/OUTPT VISIT, EST, LEVL III, 20-29 MIN: ICD-10-PCS | Mod: S$GLB,,, | Performed by: PODIATRIST

## 2023-10-17 PROCEDURE — 99999 PR PBB SHADOW E&M-EST. PATIENT-LVL V: CPT | Mod: PBBFAC,,, | Performed by: PODIATRIST

## 2023-10-17 PROCEDURE — 1101F PR PT FALLS ASSESS DOC 0-1 FALLS W/OUT INJ PAST YR: ICD-10-PCS | Mod: CPTII,S$GLB,, | Performed by: PODIATRIST

## 2023-10-17 PROCEDURE — 4010F PR ACE/ARB THEARPY RXD/TAKEN: ICD-10-PCS | Mod: CPTII,S$GLB,, | Performed by: PODIATRIST

## 2023-10-17 PROCEDURE — 1159F MED LIST DOCD IN RCRD: CPT | Mod: CPTII,S$GLB,, | Performed by: PODIATRIST

## 2023-10-17 PROCEDURE — 99999 PR PBB SHADOW E&M-EST. PATIENT-LVL V: ICD-10-PCS | Mod: PBBFAC,,, | Performed by: PODIATRIST

## 2023-10-17 PROCEDURE — 1101F PT FALLS ASSESS-DOCD LE1/YR: CPT | Mod: CPTII,S$GLB,, | Performed by: PODIATRIST

## 2023-10-17 PROCEDURE — 3008F PR BODY MASS INDEX (BMI) DOCUMENTED: ICD-10-PCS | Mod: CPTII,S$GLB,, | Performed by: PODIATRIST

## 2023-10-17 PROCEDURE — 1125F PR PAIN SEVERITY QUANTIFIED, PAIN PRESENT: ICD-10-PCS | Mod: CPTII,S$GLB,, | Performed by: PODIATRIST

## 2023-10-17 PROCEDURE — 1160F PR REVIEW ALL MEDS BY PRESCRIBER/CLIN PHARMACIST DOCUMENTED: ICD-10-PCS | Mod: CPTII,S$GLB,, | Performed by: PODIATRIST

## 2023-10-17 PROCEDURE — 99213 OFFICE O/P EST LOW 20 MIN: CPT | Mod: S$GLB,,, | Performed by: PODIATRIST

## 2023-10-17 PROCEDURE — 4010F ACE/ARB THERAPY RXD/TAKEN: CPT | Mod: CPTII,S$GLB,, | Performed by: PODIATRIST

## 2023-10-17 PROCEDURE — 3288F FALL RISK ASSESSMENT DOCD: CPT | Mod: CPTII,S$GLB,, | Performed by: PODIATRIST

## 2023-10-17 RX ORDER — DIPHENOXYLATE HYDROCHLORIDE AND ATROPINE SULFATE 2.5; .025 MG/1; MG/1
1 TABLET ORAL
COMMUNITY
Start: 2023-09-28

## 2023-10-17 RX ORDER — INSULIN GLARGINE 100 [IU]/ML
INJECTION, SOLUTION SUBCUTANEOUS
COMMUNITY
Start: 2023-06-21

## 2023-10-17 RX ORDER — ASPIRIN 81 MG/1
81 TABLET ORAL EVERY MORNING
COMMUNITY
Start: 2023-10-09

## 2023-10-17 RX ORDER — OMEGA-3-ACID ETHYL ESTERS 1 G/1
2 CAPSULE, LIQUID FILLED ORAL 2 TIMES DAILY
COMMUNITY
Start: 2023-10-09

## 2023-10-17 RX ORDER — TRAZODONE HYDROCHLORIDE 50 MG/1
50 TABLET ORAL NIGHTLY
COMMUNITY
Start: 2023-09-29 | End: 2023-10-20 | Stop reason: ALTCHOICE

## 2023-10-17 RX ORDER — OMEGA-3-ACID ETHYL ESTERS 1 G/1
2000 CAPSULE, LIQUID FILLED ORAL
COMMUNITY
Start: 2023-08-04 | End: 2023-10-20 | Stop reason: SDUPTHER

## 2023-10-17 RX ORDER — ATORVASTATIN CALCIUM 40 MG/1
40 TABLET, FILM COATED ORAL
COMMUNITY
Start: 2023-08-08

## 2023-10-20 NOTE — PROGRESS NOTES
Subjective:       Patient ID: Syl Matos is a 68 y.o. female.    Chief Complaint: Follow-up, Foot Pain, Foot Problem, and Diabetes Mellitus  Patient presents with her daughter for follow-up chronic neuropathic pain feet, uncontrolled type 2 diabetes. Patient relates nerve pain in her feet is slightly improved, foot cramping comes and goes, can be worse at night.  Taking 75 mg Lyrica twice daily prescribed by PCP.  Relates no change in diabetes has been slightly high, glucose around 147 this morning. Pian level in feet 5/10        Past Medical History:   Diagnosis Date    Atrial fibrillation     CKD (chronic kidney disease)     COPD (chronic obstructive pulmonary disease)     Diabetes mellitus     Hypercholesteremia     Hypertension      Past Surgical History:   Procedure Laterality Date    FRACTURE SURGERY      pelvis surgery    SHOULDER SURGERY Left     TUBAL LIGATION       Family History   Problem Relation Age of Onset    Heart disease Mother     Breast cancer Mother     Heart disease Father      Social History     Socioeconomic History    Marital status:    Tobacco Use    Smoking status: Never    Smokeless tobacco: Never   Substance and Sexual Activity    Alcohol use: No    Drug use: No    Sexual activity: Not Currently     Partners: Male       Current Outpatient Medications   Medication Sig Dispense Refill    ACCU-CHEK GUIDE TEST STRIPS Strp 3 (three) times daily.      albuterol (PROVENTIL/VENTOLIN HFA) 90 mcg/actuation inhaler Inhale 1-2 puffs into the lungs every 6 (six) hours as needed for Wheezing or Shortness of Breath. Rescue 18 g 2    albuterol-ipratropium (DUO-NEB) 2.5 mg-0.5 mg/3 mL nebulizer solution USE 1 VIAL IN NEBULIZER THREE TIMES DAILY AS NEEDED      amLODIPine (NORVASC) 10 MG tablet 10 mg.      aspirin (ECOTRIN) 81 MG EC tablet Take 81 mg by mouth every morning.      atorvastatin (LIPITOR) 40 MG tablet Take 40 mg by mouth.      busPIRone (BUSPAR) 15 MG tablet Take 15 mg by mouth  once daily.       cholecalciferol, vitamin D3, (VITAMIN D3) 50 mcg (2,000 unit) Tab Take 1 tablet (2,000 Units total) by mouth once daily.      diphenoxylate-atropine 2.5-0.025 mg (LOMOTIL) 2.5-0.025 mg per tablet Take 1 tablet by mouth.      empagliflozin (JARDIANCE) 10 mg tablet 10 mg.      EScitalopram oxalate (LEXAPRO) 20 MG tablet Take 20 mg by mouth every morning.      fluconazole (DIFLUCAN) 100 MG tablet Take by mouth.      glimepiride (AMARYL) 4 MG tablet Take 4 mg by mouth.      insulin (LANTUS SOLOSTAR U-100 INSULIN) glargine 100 units/mL SubQ pen   60 unit, SUBQ, HS, # 15 mL, 3 Refill(s), Maintenance, Pharmacy: [facility], Type 2 diabetes mellitus with hyperglycemia, 165, cm, 05/02/23 15:32:00 CDT, Height/Length Measured, 89.5, kg, 05/02/23 15:32:00 CDT, Weight Dosing      metFORMIN (GLUCOPHAGE) 1000 MG tablet Take 1,000 mg by mouth 2 (two) times daily.      metoprolol tartrate (LOPRESSOR) 50 MG tablet 50 mg.      MICROLET LANCET Misc USE TO TEST DAILY      omega-3 acid ethyl esters (LOVAZA) 1 gram capsule Take 2 capsules by mouth 2 (two) times daily.      ondansetron (ZOFRAN-ODT) 4 MG TbDL DISSOLVE ONE TABLET BY MOUTH THREE TIMES DAILY AS NEEDED FORNAUSEA AND VOMITING      oxybutynin (DITROPAN-XL) 10 MG 24 hr tablet Take 1 tablet (10 mg total) by mouth once daily. 90 tablet 3    pravastatin (PRAVACHOL) 20 MG tablet Take 20 mg by mouth once daily.      pregabalin (LYRICA) 75 MG capsule Take 75 mg by mouth 2 (two) times daily.      simvastatin (ZOCOR) 40 MG tablet TAKE 1 TABLET BY MOUTH EVERY MORNING. 90 tablet 2    SITagliptin (JANUVIA) 100 MG Tab Take 100 mg by mouth once daily.      traZODone (DESYREL) 100 MG tablet Take 100 mg by mouth every evening.      WIXELA INHUB 250-50 mcg/dose diskus inhaler Inhale into the lungs.      carvediloL (COREG) 25 MG tablet Take 1 tablet (25 mg total) by mouth 2 (two) times daily with meals. 60 tablet 11    insulin lispro 100 unit/mL injection  "Inject 14 Units into the skin 3 (three) times daily with meals. 12.6 mL 11    ketoconazole (NIZORAL) 2 % cream Apply topically once daily. 39 g 2    lisinopriL (PRINIVIL,ZESTRIL) 20 MG tablet 20 mg.      nitroGLYCERIN (NITROSTAT) 0.4 MG SL tablet Place under the tongue.      omega-3 fatty acids 1,000 mg Cap Take 2 capsules (2,000 mg total) by mouth 2 (two) times daily. Take as directed for triglycerides and to decrease risk of heart disease and stroke.  0     No current facility-administered medications for this visit.     Review of patient's allergies indicates:   Allergen Reactions    Iodine and iodide containing products Hives       Review of Systems   Cardiovascular:  Positive for leg swelling.   Endocrine:        DM 2 x 25 yrs   Musculoskeletal:  Negative for gait problem.   All other systems reviewed and are negative.      Objective:      Vitals:    10/17/23 0918   BP: 136/82   Pulse: 61   Resp: 18   Weight: 87.1 kg (192 lb)   Height: 5' 2" (1.575 m)     Physical Exam  Vitals and nursing note reviewed.   Constitutional:       General: She is not in acute distress.     Appearance: Normal appearance.   Cardiovascular:      Pulses:           Dorsalis pedis pulses are 2+ on the right side and 2+ on the left side.        Posterior tibial pulses are 1+ on the right side and 1+ on the left side.   Pulmonary:      Effort: Pulmonary effort is normal.   Musculoskeletal:         General: No deformity. Normal range of motion.      Right foot: No deformity.      Left foot: No deformity.   Feet:      Right foot:      Skin integrity: Callus and dry skin present.      Toenail Condition: Right toenails are long.      Left foot:      Skin integrity: Callus (mild, improved hyperkeratotic tissue medial b/l hallux) and dry skin present.      Toenail Condition: Left toenails are long.   Skin:     General: Skin is dry.      Capillary Refill: Capillary refill takes 2 to 3 seconds.   Neurological:      Mental Status: She is alert.     "  Comments: Painful paresthesias, neuropathic foot pain   Psychiatric:         Behavior: Behavior normal.         Thought Content: Thought content normal.                              Assessment:       1. Type 2 diabetes mellitus with peripheral neuropathy    2. Neuropathic pain of both feet    3. Foot callus    4. Pedal edema              Plan:           Reviewed neuropathic pain medication and does she is currently taking.  Advised patient better control of her diabetes may offer her decrease neuropathic pain especially over an extended period of time  Continuing muscle relaxer at bedtime as needed  Again reviewed several other conservative measures for neuropathy performed each night is most effective  Reviewed need for daily walking, stretching, keep moving   Reviewed diabetic education  Advised patient better control of daily glucose/diabetes can significantly reduce neuropathic pain in feet  Reviewed wide appropriate tennis shoes especially indoors to protect feet    Discussed maintenance of dry skin, calluses and nails and potential complications.   Calluses debrided   Nails debrided in thickness reduced, no areas of complication at this time  Reviewed need for daily foot checks and instructed patient to contact the office with any area of redness or swelling which has not improved within 3 days  Patient was in understanding and agreement with treatment plan.  I counseled the patient on their conditions, implications and medical management.  Instructed patient/family to contact the office with any changes, questions, concerns, worsening of symptoms.   Total face to face time 20 minutes, exam, assessment, treatment, discussion, additional time for review of chart prior to and following appointment and visit documentation, consultation and coordination of care.   Follow up 3 months    This note was created using M*Modal voice recognition software that occasionally misinterpreted phrases or words.

## 2024-01-19 ENCOUNTER — OFFICE VISIT (OUTPATIENT)
Dept: PODIATRY | Facility: CLINIC | Age: 69
End: 2024-01-19
Payer: MEDICARE

## 2024-01-19 VITALS
HEIGHT: 62 IN | BODY MASS INDEX: 34.69 KG/M2 | RESPIRATION RATE: 16 BRPM | DIASTOLIC BLOOD PRESSURE: 75 MMHG | SYSTOLIC BLOOD PRESSURE: 117 MMHG | HEART RATE: 67 BPM | WEIGHT: 188.5 LBS

## 2024-01-19 DIAGNOSIS — E11.42 TYPE 2 DIABETES MELLITUS WITH PERIPHERAL NEUROPATHY: ICD-10-CM

## 2024-01-19 DIAGNOSIS — G57.93 NEUROPATHIC PAIN OF BOTH FEET: ICD-10-CM

## 2024-01-19 DIAGNOSIS — E11.9 COMPREHENSIVE DIABETIC FOOT EXAMINATION, TYPE 2 DM, ENCOUNTER FOR: Primary | ICD-10-CM

## 2024-01-19 DIAGNOSIS — L85.3 DRY SKIN: ICD-10-CM

## 2024-01-19 DIAGNOSIS — L84 FOOT CALLUS: ICD-10-CM

## 2024-01-19 PROCEDURE — 1126F AMNT PAIN NOTED NONE PRSNT: CPT | Mod: CPTII,S$GLB,, | Performed by: PODIATRIST

## 2024-01-19 PROCEDURE — 1101F PT FALLS ASSESS-DOCD LE1/YR: CPT | Mod: CPTII,S$GLB,, | Performed by: PODIATRIST

## 2024-01-19 PROCEDURE — 3078F DIAST BP <80 MM HG: CPT | Mod: CPTII,S$GLB,, | Performed by: PODIATRIST

## 2024-01-19 PROCEDURE — 3074F SYST BP LT 130 MM HG: CPT | Mod: CPTII,S$GLB,, | Performed by: PODIATRIST

## 2024-01-19 PROCEDURE — 3008F BODY MASS INDEX DOCD: CPT | Mod: CPTII,S$GLB,, | Performed by: PODIATRIST

## 2024-01-19 PROCEDURE — 1159F MED LIST DOCD IN RCRD: CPT | Mod: CPTII,S$GLB,, | Performed by: PODIATRIST

## 2024-01-19 PROCEDURE — 99999 PR PBB SHADOW E&M-EST. PATIENT-LVL V: CPT | Mod: PBBFAC,,, | Performed by: PODIATRIST

## 2024-01-19 PROCEDURE — 99214 OFFICE O/P EST MOD 30 MIN: CPT | Mod: S$GLB,,, | Performed by: PODIATRIST

## 2024-01-19 PROCEDURE — 3288F FALL RISK ASSESSMENT DOCD: CPT | Mod: CPTII,S$GLB,, | Performed by: PODIATRIST

## 2024-01-19 PROCEDURE — 1160F RVW MEDS BY RX/DR IN RCRD: CPT | Mod: CPTII,S$GLB,, | Performed by: PODIATRIST

## 2024-01-19 RX ORDER — SEMAGLUTIDE 0.68 MG/ML
0.5 INJECTION, SOLUTION SUBCUTANEOUS
COMMUNITY
Start: 2023-12-05

## 2024-01-19 RX ORDER — CYCLOBENZAPRINE HCL 10 MG
10 TABLET ORAL NIGHTLY PRN
COMMUNITY
Start: 2023-11-06 | End: 2024-06-17 | Stop reason: SDUPTHER

## 2024-01-19 RX ORDER — GABAPENTIN 300 MG/1
300 CAPSULE ORAL NIGHTLY
COMMUNITY
Start: 2024-01-09 | End: 2024-06-17 | Stop reason: SDUPTHER

## 2024-01-21 NOTE — PROGRESS NOTES
Subjective:       Patient ID: Syl Matos is a 68 y.o. female.    Chief Complaint: Diabetic Foot Exam  Patient presents with her daughter for annual diabetic foot exam, diabetic neuropathy.  PCP discontinued Lyrica, trying gabapentin 300mg tid, relates it seems to be helping more.  Does confirm burning and tingling in her feet but relates no pain in her at time of visit today with exception of a callus on the side of the left great toe, pain with pressure only.  See Viv Phillips NP endocrinology for diabetes and thyroid  Reports improvement in A1c since starting Trulicity.  Reports she is lost some weight.  Believes her A1c went down from 7.9 to 7.3.  Relates glucose was 181 this morning        Past Medical History:   Diagnosis Date    Atrial fibrillation     CKD (chronic kidney disease)     COPD (chronic obstructive pulmonary disease)     Diabetes mellitus     Hypercholesteremia     Hypertension      Past Surgical History:   Procedure Laterality Date    FRACTURE SURGERY      pelvis surgery    SHOULDER SURGERY Left     TUBAL LIGATION       Family History   Problem Relation Age of Onset    Heart disease Mother     Breast cancer Mother     Heart disease Father      Social History     Socioeconomic History    Marital status:    Tobacco Use    Smoking status: Never    Smokeless tobacco: Never   Substance and Sexual Activity    Alcohol use: No    Drug use: No    Sexual activity: Not Currently     Partners: Male       Current Outpatient Medications   Medication Sig Dispense Refill    ACCU-CHEK GUIDE TEST STRIPS Strp 3 (three) times daily.      albuterol (PROVENTIL/VENTOLIN HFA) 90 mcg/actuation inhaler Inhale 1-2 puffs into the lungs every 6 (six) hours as needed for Wheezing or Shortness of Breath. Rescue 18 g 2    albuterol-ipratropium (DUO-NEB) 2.5 mg-0.5 mg/3 mL nebulizer solution USE 1 VIAL IN NEBULIZER THREE TIMES DAILY AS NEEDED      amLODIPine (NORVASC) 10 MG tablet 10 mg.      aspirin (ECOTRIN)  81 MG EC tablet Take 81 mg by mouth every morning.      atorvastatin (LIPITOR) 40 MG tablet Take 40 mg by mouth.      busPIRone (BUSPAR) 15 MG tablet Take 15 mg by mouth once daily.       cholecalciferol, vitamin D3, (VITAMIN D3) 50 mcg (2,000 unit) Tab Take 1 tablet (2,000 Units total) by mouth once daily.      cyclobenzaprine (FLEXERIL) 10 MG tablet Take 10 mg by mouth every evening.      diphenoxylate-atropine 2.5-0.025 mg (LOMOTIL) 2.5-0.025 mg per tablet Take 1 tablet by mouth.      empagliflozin (JARDIANCE) 10 mg tablet 10 mg.      EScitalopram oxalate (LEXAPRO) 20 MG tablet Take 20 mg by mouth every morning.      gabapentin (NEURONTIN) 300 MG capsule Take 300 mg by mouth every evening.      glimepiride (AMARYL) 4 MG tablet Take 4 mg by mouth.      insulin (LANTUS SOLOSTAR U-100 INSULIN) glargine 100 units/mL SubQ pen   60 unit, SUBQ, HS, # 15 mL, 3 Refill(s), Maintenance, Pharmacy: [facility], Type 2 diabetes mellitus with hyperglycemia, 165, cm, 05/02/23 15:32:00 CDT, Height/Length Measured, 89.5, kg, 05/02/23 15:32:00 CDT, Weight Dosing      metFORMIN (GLUCOPHAGE) 1000 MG tablet Take 1,000 mg by mouth 2 (two) times daily.      metoprolol tartrate (LOPRESSOR) 50 MG tablet 50 mg.      MICROLET LANCET Misc USE TO TEST DAILY      omega-3 acid ethyl esters (LOVAZA) 1 gram capsule Take 2 capsules by mouth 2 (two) times daily.      OZEMPIC 0.25 mg or 0.5 mg (2 mg/3 mL) pen injector 0.5 mg.      pravastatin (PRAVACHOL) 20 MG tablet Take 20 mg by mouth once daily.      traZODone (DESYREL) 100 MG tablet Take 100 mg by mouth every evening.      carvediloL (COREG) 25 MG tablet Take 1 tablet (25 mg total) by mouth 2 (two) times daily with meals. 60 tablet 11    fluconazole (DIFLUCAN) 100 MG tablet Take by mouth.      insulin lispro 100 unit/mL injection Inject 14 Units into the skin 3 (three) times daily with meals. 12.6 mL 11    ketoconazole (NIZORAL) 2 % cream Apply topically once daily. 39 g 2  "   lisinopriL (PRINIVIL,ZESTRIL) 20 MG tablet 20 mg.      nitroGLYCERIN (NITROSTAT) 0.4 MG SL tablet Place under the tongue.      omega-3 fatty acids 1,000 mg Cap Take 2 capsules (2,000 mg total) by mouth 2 (two) times daily. Take as directed for triglycerides and to decrease risk of heart disease and stroke.  0    ondansetron (ZOFRAN-ODT) 4 MG TbDL DISSOLVE ONE TABLET BY MOUTH THREE TIMES DAILY AS NEEDED FORNAUSEA AND VOMITING      oxybutynin (DITROPAN-XL) 10 MG 24 hr tablet Take 1 tablet (10 mg total) by mouth once daily. 90 tablet 3    simvastatin (ZOCOR) 40 MG tablet TAKE 1 TABLET BY MOUTH EVERY MORNING. 90 tablet 2    WIXELA INHUB 250-50 mcg/dose diskus inhaler Inhale into the lungs.       No current facility-administered medications for this visit.     Review of patient's allergies indicates:   Allergen Reactions    Iodine and iodide containing products Hives       Review of Systems   Cardiovascular:  Negative for leg swelling.   Endocrine:        DM 2 x 25 yrs   Musculoskeletal:  Negative for gait problem.   All other systems reviewed and are negative.      Objective:      Vitals:    01/19/24 1124   BP: 117/75   Pulse: 67   Resp: 16   Weight: 85.5 kg (188 lb 8 oz)   Height: 5' 2" (1.575 m)     Physical Exam  Vitals and nursing note reviewed.   Constitutional:       General: She is not in acute distress.     Appearance: Normal appearance.   Cardiovascular:      Pulses:           Dorsalis pedis pulses are 2+ on the right side and 2+ on the left side.        Posterior tibial pulses are 1+ on the right side and 1+ on the left side.   Pulmonary:      Effort: Pulmonary effort is normal.   Musculoskeletal:         General: No deformity. Normal range of motion.      Right foot: No deformity.      Left foot: No deformity.   Feet:      Right foot:      Protective Sensation: 6 sites tested.  6 sites sensed.      Skin integrity: Callus and dry skin present.      Toenail Condition: Right toenails are long.      Left foot: "      Protective Sensation: 6 sites tested.  6 sites sensed.      Skin integrity: Callus (Increased thick raised hyperkeratotic lesion medial IPJ hallux left greater than right) and dry skin present.      Toenail Condition: Left toenails are long.   Skin:     General: Skin is dry.      Capillary Refill: Capillary refill takes 2 to 3 seconds.   Neurological:      Mental Status: She is alert.      Comments: Detected monofilament all areas bilateral feet, history diabetic neuropathy with subtle paresthesias, neuropathic foot pain   Psychiatric:         Behavior: Behavior normal.         Thought Content: Thought content normal.                              Assessment:       1. Comprehensive diabetic foot examination, type 2 DM, encounter for    2. Type 2 diabetes mellitus with peripheral neuropathy    3. Neuropathic pain of both feet    4. Foot callus - Left Foot    5. Dry skin                Plan:             Comprehensive diabetic pedal exam performed  Discussed results monofilament testing, good sensation in feet which is a good sign considering the length of time she has been diabetic  Reviewed with patient how important it is to continue to monitor her daily glucose and decrease her A1c and how uncontrolled diabetes plays a significant role in the neuropathy pain in her feet  Reviewed gabapentin and how this medication works  Reviewed benefit of controled glucose/diabetes regarding potential foot problems especially neuropathy.    Reviewed conservative treatments for neuropathy at length  Reviewed importance of daily walking, stretching  Reviewed diabetic education  Reviewed better care and maintenance of dry skin and potential complications.  We discussed several over-the-counter products, lotion which should be applied each night  Reviewed care and maintenance of callus, discussed other over-the-counter topical treatments which are more effective for calluses and the need to monitor this area daily, if there is  any discoloration which forms within this lesion it needs to be treated sooner  Calluses debrided medial IPJ bilateral hallux  Reviewed care and maintenance of nails, nails debrided  Reviewed appropriate shoes,  especially indoors to protect feet, no flat shoes, slippers or walking in sock or bare feet.    Reviewed with patient how important it is to wear appropriate shoe indoor and how this helps her neuropathy  Reviewed need for daily foot checks and instructed patient to contact the office with any area of redness or swelling which has not improved in a few days.  Patient was in understanding and agreement with treatment plan.  I counseled the patient on their conditions, implications and medical management.  Instructed patient/family to contact the office with any changes, questions, concerns, worsening of symptoms.   Total face to face time 30 minutes, exam, assessment, treatment, discussion, additional time for review of chart prior to and following appointment and visit documentation, consultation and coordination of care.   Follow up 3 months    This note was created using M*Modal voice recognition software that occasionally misinterpreted phrases or words.

## 2024-04-19 ENCOUNTER — OFFICE VISIT (OUTPATIENT)
Dept: PODIATRY | Facility: CLINIC | Age: 69
End: 2024-04-19
Payer: MEDICARE

## 2024-04-19 VITALS
BODY MASS INDEX: 34.6 KG/M2 | WEIGHT: 188 LBS | RESPIRATION RATE: 16 BRPM | HEIGHT: 62 IN | SYSTOLIC BLOOD PRESSURE: 109 MMHG | DIASTOLIC BLOOD PRESSURE: 70 MMHG | HEART RATE: 68 BPM

## 2024-04-19 DIAGNOSIS — E11.42 TYPE 2 DIABETES MELLITUS WITH PERIPHERAL NEUROPATHY: Primary | ICD-10-CM

## 2024-04-19 DIAGNOSIS — L84 FOOT CALLUS: ICD-10-CM

## 2024-04-19 PROCEDURE — 3078F DIAST BP <80 MM HG: CPT | Mod: CPTII,S$GLB,, | Performed by: PODIATRIST

## 2024-04-19 PROCEDURE — 99999 PR PBB SHADOW E&M-EST. PATIENT-LVL V: CPT | Mod: PBBFAC,,, | Performed by: PODIATRIST

## 2024-04-19 PROCEDURE — 99213 OFFICE O/P EST LOW 20 MIN: CPT | Mod: S$GLB,,, | Performed by: PODIATRIST

## 2024-04-19 PROCEDURE — 1101F PT FALLS ASSESS-DOCD LE1/YR: CPT | Mod: CPTII,S$GLB,, | Performed by: PODIATRIST

## 2024-04-19 PROCEDURE — 3288F FALL RISK ASSESSMENT DOCD: CPT | Mod: CPTII,S$GLB,, | Performed by: PODIATRIST

## 2024-04-19 PROCEDURE — 3008F BODY MASS INDEX DOCD: CPT | Mod: CPTII,S$GLB,, | Performed by: PODIATRIST

## 2024-04-19 PROCEDURE — 1126F AMNT PAIN NOTED NONE PRSNT: CPT | Mod: CPTII,S$GLB,, | Performed by: PODIATRIST

## 2024-04-19 PROCEDURE — 1159F MED LIST DOCD IN RCRD: CPT | Mod: CPTII,S$GLB,, | Performed by: PODIATRIST

## 2024-04-19 PROCEDURE — 1160F RVW MEDS BY RX/DR IN RCRD: CPT | Mod: CPTII,S$GLB,, | Performed by: PODIATRIST

## 2024-04-19 PROCEDURE — 3074F SYST BP LT 130 MM HG: CPT | Mod: CPTII,S$GLB,, | Performed by: PODIATRIST

## 2024-04-19 RX ORDER — SITAGLIPTIN 100 MG/1
100 TABLET, FILM COATED ORAL
COMMUNITY
Start: 2024-03-08 | End: 2024-06-17

## 2024-04-19 RX ORDER — OMEGA-3-ACID ETHYL ESTERS 1 G/1
2000 CAPSULE, LIQUID FILLED ORAL
COMMUNITY
Start: 2024-02-02 | End: 2024-04-21 | Stop reason: SDUPTHER

## 2024-04-19 RX ORDER — INSULIN GLARGINE 100 [IU]/ML
50 INJECTION, SOLUTION SUBCUTANEOUS NIGHTLY
COMMUNITY
Start: 2024-03-12

## 2024-04-19 RX ORDER — SEMAGLUTIDE 0.68 MG/ML
0.5 INJECTION, SOLUTION SUBCUTANEOUS
COMMUNITY
Start: 2024-02-02 | End: 2024-04-21 | Stop reason: SDUPTHER

## 2024-04-21 NOTE — PROGRESS NOTES
Subjective:       Patient ID: Syl Matos is a 68 y.o. female.    Chief Complaint: Follow-up and Diabetes Mellitus  Patient presents for follow-up diabetes.  Relates neuropathy has been doing well with gabapentin  Relates diabetes has been doing well with glucose 159 this morning  Relates taking better care of dry skin/calluses  See Viv Phillips NP endocrinology for diabetes and thyroid          Past Medical History:   Diagnosis Date    Atrial fibrillation     CKD (chronic kidney disease)     COPD (chronic obstructive pulmonary disease)     Diabetes mellitus     Hypercholesteremia     Hypertension      Past Surgical History:   Procedure Laterality Date    FRACTURE SURGERY      pelvis surgery    SHOULDER SURGERY Left     TUBAL LIGATION       Family History   Problem Relation Name Age of Onset    Heart disease Mother      Breast cancer Mother      Heart disease Father       Social History     Socioeconomic History    Marital status:    Tobacco Use    Smoking status: Never    Smokeless tobacco: Never   Substance and Sexual Activity    Alcohol use: No    Drug use: No    Sexual activity: Not Currently     Partners: Male       Current Outpatient Medications   Medication Sig Dispense Refill    ACCU-CHEK GUIDE TEST STRIPS Strp 3 (three) times daily.      albuterol (PROVENTIL/VENTOLIN HFA) 90 mcg/actuation inhaler Inhale 1-2 puffs into the lungs every 6 (six) hours as needed for Wheezing or Shortness of Breath. Rescue 18 g 2    albuterol-ipratropium (DUO-NEB) 2.5 mg-0.5 mg/3 mL nebulizer solution USE 1 VIAL IN NEBULIZER THREE TIMES DAILY AS NEEDED      amLODIPine (NORVASC) 10 MG tablet 10 mg.      aspirin (ECOTRIN) 81 MG EC tablet Take 81 mg by mouth every morning.      atorvastatin (LIPITOR) 40 MG tablet Take 40 mg by mouth.      busPIRone (BUSPAR) 15 MG tablet Take 15 mg by mouth once daily.       cholecalciferol, vitamin D3, (VITAMIN D3) 50 mcg (2,000 unit) Tab Take 1 tablet (2,000 Units total) by mouth  once daily.      cyclobenzaprine (FLEXERIL) 10 MG tablet Take 10 mg by mouth every evening.      diphenoxylate-atropine 2.5-0.025 mg (LOMOTIL) 2.5-0.025 mg per tablet Take 1 tablet by mouth.      empagliflozin (JARDIANCE) 10 mg tablet 10 mg.      EScitalopram oxalate (LEXAPRO) 20 MG tablet Take 20 mg by mouth every morning.      gabapentin (NEURONTIN) 300 MG capsule Take 300 mg by mouth every evening.      JANUVIA 100 mg Tab Take 100 mg by mouth.      LANTUS U-100 INSULIN 100 unit/mL injection SMARTSI Unit(s) SUB-Q Daily      metFORMIN (GLUCOPHAGE) 1000 MG tablet Take 1,000 mg by mouth 2 (two) times daily.      metoprolol tartrate (LOPRESSOR) 50 MG tablet 50 mg.      MICROLET LANCET Misc USE TO TEST DAILY      omega-3 acid ethyl esters (LOVAZA) 1 gram capsule Take 2 capsules by mouth 2 (two) times daily.      ondansetron (ZOFRAN-ODT) 4 MG TbDL DISSOLVE ONE TABLET BY MOUTH THREE TIMES DAILY AS NEEDED FORNAUSEA AND VOMITING      OZEMPIC 0.25 mg or 0.5 mg (2 mg/3 mL) pen injector 0.5 mg.      traZODone (DESYREL) 100 MG tablet Take 100 mg by mouth every evening.      carvediloL (COREG) 25 MG tablet Take 1 tablet (25 mg total) by mouth 2 (two) times daily with meals. 60 tablet 11    fluconazole (DIFLUCAN) 100 MG tablet Take by mouth. (Patient not taking: Reported on 2024)      glimepiride (AMARYL) 4 MG tablet Take 4 mg by mouth.      insulin (LANTUS SOLOSTAR U-100 INSULIN) glargine 100 units/mL SubQ pen   60 unit, SUBQ, HS, # 15 mL, 3 Refill(s), Maintenance, Pharmacy: [facility], Type 2 diabetes mellitus with hyperglycemia, 165, cm, 23 15:32:00 CDT, Height/Length Measured, 89.5, kg, 23 15:32:00 CDT, Weight Dosing (Patient not taking: Reported on 2024)      insulin lispro 100 unit/mL injection Inject 14 Units into the skin 3 (three) times daily with meals. 12.6 mL 11    ketoconazole (NIZORAL) 2 % cream Apply topically once daily. 39 g 2    lisinopriL (PRINIVIL,ZESTRIL) 20  "MG tablet 20 mg.      nitroGLYCERIN (NITROSTAT) 0.4 MG SL tablet Place under the tongue. (Patient not taking: Reported on 4/19/2024)      omega-3 acid ethyl esters (LOVAZA) 1 gram capsule 2,000 mg. (Patient not taking: Reported on 4/19/2024)      omega-3 fatty acids 1,000 mg Cap Take 2 capsules (2,000 mg total) by mouth 2 (two) times daily. Take as directed for triglycerides and to decrease risk of heart disease and stroke.  0    oxybutynin (DITROPAN-XL) 10 MG 24 hr tablet Take 1 tablet (10 mg total) by mouth once daily. 90 tablet 3    pravastatin (PRAVACHOL) 20 MG tablet Take 20 mg by mouth once daily.      semaglutide (OZEMPIC) 0.25 mg or 0.5 mg (2 mg/3 mL) pen injector 0.5 mg. (Patient not taking: Reported on 4/19/2024)      simvastatin (ZOCOR) 40 MG tablet TAKE 1 TABLET BY MOUTH EVERY MORNING. 90 tablet 2    WIXELA INHUB 250-50 mcg/dose diskus inhaler Inhale into the lungs.       No current facility-administered medications for this visit.     Review of patient's allergies indicates:   Allergen Reactions    Iodine and iodide containing products Hives       Review of Systems   Cardiovascular:  Negative for leg swelling.   Endocrine:        DM 2 x 25 yrs   Musculoskeletal:  Negative for gait problem.   All other systems reviewed and are negative.      Objective:      Vitals:    04/19/24 1126   BP: 109/70   Pulse: 68   Resp: 16   Weight: 85.3 kg (188 lb)   Height: 5' 2" (1.575 m)     Physical Exam  Vitals and nursing note reviewed.   Constitutional:       Appearance: Normal appearance.   Cardiovascular:      Pulses:           Dorsalis pedis pulses are 2+ on the right side and 2+ on the left side.        Posterior tibial pulses are 1+ on the right side and 1+ on the left side.   Pulmonary:      Effort: Pulmonary effort is normal.   Musculoskeletal:         General: No deformity.      Right foot: No deformity.      Left foot: No deformity.   Feet:      Right foot:      Skin integrity: Callus present.      Toenail " Condition: Right toenails are long.      Left foot:      Skin integrity: Callus (hyperkeratotic lesion medial IPJ hallux left greater than right) present.      Toenail Condition: Left toenails are long.   Skin:     General: Skin is dry.      Capillary Refill: Capillary refill takes 2 to 3 seconds.   Neurological:      Mental Status: She is alert.   Psychiatric:         Thought Content: Thought content normal.          Assessment:       1. Type 2 diabetes mellitus with peripheral neuropathy    2. Foot callus - Left Foot            Plan:         Reviewed much improved skin texture, maintenance of calluses improved  Had a lengthy discussion regarding care and maintenance of skin, calluses and appropriate shoes to prevent recurrence  Reviewed arch supports especially for calluses in this location  Reviewed appropriate arch support, remove existing insole, gradually adjust to wearing comfortably  Calluses debrided medial IPJ bilateral hallux  Reviewed care and maintenance of nails, nails debrided  Reviewed neuropathy at length and advised patient the best treatment is to continue to control her diabetes  Reviewed benefit of well-controlled daily glucose  Reviewed the importance of daily walking and stretching  Reviewed need for daily foot checks and instructed to contact the office with any area of concern which has not improved in a few days.  Patient was in understanding and agreement with treatment plan.  I counseled the patient on their conditions, implications and medical management.  Instructed patient to contact the office with any changes, questions, concerns, worsening of symptoms.   Total face to face time 20 minutes, exam, assessment, treatment, discussion, additional time for review of chart prior to and following appointment and visit documentation, consultation and coordination of care.   Follow up 3 months    This note was created using M*Modal voice recognition software that occasionally misinterpreted  phrases or words.

## 2024-06-17 ENCOUNTER — HOSPITAL ENCOUNTER (OUTPATIENT)
Dept: RADIOLOGY | Facility: HOSPITAL | Age: 69
Discharge: HOME | End: 2024-06-17
Attending: NURSE PRACTITIONER
Payer: MEDICARE

## 2024-06-17 ENCOUNTER — OFFICE VISIT (OUTPATIENT)
Dept: FAMILY MEDICINE | Facility: CLINIC | Age: 69
End: 2024-06-17
Payer: MEDICARE

## 2024-06-17 VITALS
HEIGHT: 62 IN | BODY MASS INDEX: 34.44 KG/M2 | HEART RATE: 68 BPM | WEIGHT: 187.13 LBS | RESPIRATION RATE: 16 BRPM | OXYGEN SATURATION: 95 % | SYSTOLIC BLOOD PRESSURE: 126 MMHG | DIASTOLIC BLOOD PRESSURE: 72 MMHG

## 2024-06-17 DIAGNOSIS — F32.5 MAJOR DEPRESSIVE DISORDER, SINGLE EPISODE, IN FULL REMISSION: ICD-10-CM

## 2024-06-17 DIAGNOSIS — I15.2 HYPERTENSION ASSOCIATED WITH DIABETES: ICD-10-CM

## 2024-06-17 DIAGNOSIS — Z11.59 NEED FOR HEPATITIS C SCREENING TEST: ICD-10-CM

## 2024-06-17 DIAGNOSIS — I48.0 PAF (PAROXYSMAL ATRIAL FIBRILLATION): ICD-10-CM

## 2024-06-17 DIAGNOSIS — D50.8 IRON DEFICIENCY ANEMIA SECONDARY TO INADEQUATE DIETARY IRON INTAKE: ICD-10-CM

## 2024-06-17 DIAGNOSIS — M79.604 RIGHT LEG PAIN: ICD-10-CM

## 2024-06-17 DIAGNOSIS — J43.1 PANLOBULAR EMPHYSEMA: ICD-10-CM

## 2024-06-17 DIAGNOSIS — E53.8 B12 DEFICIENCY: ICD-10-CM

## 2024-06-17 DIAGNOSIS — Z76.89 ENCOUNTER TO ESTABLISH CARE: Primary | ICD-10-CM

## 2024-06-17 DIAGNOSIS — Z79.4 TYPE 2 DIABETES MELLITUS WITH HYPERGLYCEMIA, WITH LONG-TERM CURRENT USE OF INSULIN: ICD-10-CM

## 2024-06-17 DIAGNOSIS — E55.9 VITAMIN D DEFICIENCY: ICD-10-CM

## 2024-06-17 DIAGNOSIS — F32.A ANXIETY AND DEPRESSION: ICD-10-CM

## 2024-06-17 DIAGNOSIS — F41.9 ANXIETY AND DEPRESSION: ICD-10-CM

## 2024-06-17 DIAGNOSIS — F51.01 PRIMARY INSOMNIA: ICD-10-CM

## 2024-06-17 DIAGNOSIS — E05.00 GRAVES' DISEASE IN REMISSION: ICD-10-CM

## 2024-06-17 DIAGNOSIS — E11.59 HYPERTENSION ASSOCIATED WITH DIABETES: ICD-10-CM

## 2024-06-17 DIAGNOSIS — N32.81 OAB (OVERACTIVE BLADDER): ICD-10-CM

## 2024-06-17 DIAGNOSIS — Z12.31 BREAST CANCER SCREENING BY MAMMOGRAM: ICD-10-CM

## 2024-06-17 DIAGNOSIS — E11.65 TYPE 2 DIABETES MELLITUS WITH HYPERGLYCEMIA, WITH LONG-TERM CURRENT USE OF INSULIN: ICD-10-CM

## 2024-06-17 DIAGNOSIS — Z11.4 ENCOUNTER FOR SCREENING FOR HUMAN IMMUNODEFICIENCY VIRUS (HIV): ICD-10-CM

## 2024-06-17 PROBLEM — J18.9 PNEUMONIA OF RIGHT LOWER LOBE DUE TO INFECTIOUS ORGANISM: Status: RESOLVED | Noted: 2022-03-25 | Resolved: 2024-06-17

## 2024-06-17 PROBLEM — I10 ESSENTIAL HYPERTENSION: Status: RESOLVED | Noted: 2020-06-23 | Resolved: 2024-06-17

## 2024-06-17 PROCEDURE — 3074F SYST BP LT 130 MM HG: CPT | Mod: CPTII,S$GLB,, | Performed by: NURSE PRACTITIONER

## 2024-06-17 PROCEDURE — 1101F PT FALLS ASSESS-DOCD LE1/YR: CPT | Mod: CPTII,S$GLB,, | Performed by: NURSE PRACTITIONER

## 2024-06-17 PROCEDURE — 3066F NEPHROPATHY DOC TX: CPT | Mod: CPTII,S$GLB,, | Performed by: NURSE PRACTITIONER

## 2024-06-17 PROCEDURE — 3288F FALL RISK ASSESSMENT DOCD: CPT | Mod: CPTII,S$GLB,, | Performed by: NURSE PRACTITIONER

## 2024-06-17 PROCEDURE — 3061F NEG MICROALBUMINURIA REV: CPT | Mod: CPTII,S$GLB,, | Performed by: NURSE PRACTITIONER

## 2024-06-17 PROCEDURE — 1160F RVW MEDS BY RX/DR IN RCRD: CPT | Mod: CPTII,S$GLB,, | Performed by: NURSE PRACTITIONER

## 2024-06-17 PROCEDURE — 3078F DIAST BP <80 MM HG: CPT | Mod: CPTII,S$GLB,, | Performed by: NURSE PRACTITIONER

## 2024-06-17 PROCEDURE — 1159F MED LIST DOCD IN RCRD: CPT | Mod: CPTII,S$GLB,, | Performed by: NURSE PRACTITIONER

## 2024-06-17 PROCEDURE — 3008F BODY MASS INDEX DOCD: CPT | Mod: CPTII,S$GLB,, | Performed by: NURSE PRACTITIONER

## 2024-06-17 PROCEDURE — 73590 X-RAY EXAM OF LOWER LEG: CPT | Mod: TC,RT

## 2024-06-17 PROCEDURE — 99999 PR PBB SHADOW E&M-EST. PATIENT-LVL IV: CPT | Mod: PBBFAC,,, | Performed by: NURSE PRACTITIONER

## 2024-06-17 PROCEDURE — 1125F AMNT PAIN NOTED PAIN PRSNT: CPT | Mod: CPTII,S$GLB,, | Performed by: NURSE PRACTITIONER

## 2024-06-17 PROCEDURE — 73590 X-RAY EXAM OF LOWER LEG: CPT | Mod: 26,RT,, | Performed by: RADIOLOGY

## 2024-06-17 PROCEDURE — 3051F HG A1C>EQUAL 7.0%<8.0%: CPT | Mod: CPTII,S$GLB,, | Performed by: NURSE PRACTITIONER

## 2024-06-17 PROCEDURE — 99204 OFFICE O/P NEW MOD 45 MIN: CPT | Mod: S$GLB,,, | Performed by: NURSE PRACTITIONER

## 2024-06-17 RX ORDER — BUSPIRONE HYDROCHLORIDE 15 MG/1
15 TABLET ORAL 2 TIMES DAILY
Qty: 180 TABLET | Refills: 3 | Status: SHIPPED | OUTPATIENT
Start: 2024-06-17

## 2024-06-17 RX ORDER — ESCITALOPRAM OXALATE 20 MG/1
20 TABLET ORAL EVERY MORNING
Qty: 90 TABLET | Refills: 3 | Status: SHIPPED | OUTPATIENT
Start: 2024-06-17

## 2024-06-17 RX ORDER — METFORMIN HYDROCHLORIDE 1000 MG/1
1000 TABLET ORAL 2 TIMES DAILY
Qty: 180 TABLET | Refills: 3 | Status: SHIPPED | OUTPATIENT
Start: 2024-06-17

## 2024-06-17 RX ORDER — METOPROLOL TARTRATE 50 MG/1
50 TABLET ORAL 2 TIMES DAILY
Qty: 180 TABLET | Refills: 3 | Status: SHIPPED | OUTPATIENT
Start: 2024-06-17

## 2024-06-17 RX ORDER — FLUTICASONE PROPIONATE AND SALMETEROL 250; 50 UG/1; UG/1
1 POWDER RESPIRATORY (INHALATION) 2 TIMES DAILY
Qty: 180 EACH | Refills: 3 | Status: SHIPPED | OUTPATIENT
Start: 2024-06-17

## 2024-06-17 RX ORDER — TRAZODONE HYDROCHLORIDE 50 MG/1
50 TABLET ORAL NIGHTLY PRN
Qty: 90 TABLET | Refills: 3 | Status: SHIPPED | OUTPATIENT
Start: 2024-06-17

## 2024-06-17 RX ORDER — OXYBUTYNIN CHLORIDE 10 MG/1
10 TABLET, EXTENDED RELEASE ORAL DAILY
Qty: 90 TABLET | Refills: 3 | Status: SHIPPED | OUTPATIENT
Start: 2024-06-17

## 2024-06-17 RX ORDER — TRAZODONE HYDROCHLORIDE 50 MG/1
50 TABLET ORAL NIGHTLY PRN
COMMUNITY
End: 2024-06-17 | Stop reason: SDUPTHER

## 2024-06-17 RX ORDER — NITROGLYCERIN 0.4 MG/1
0.4 TABLET SUBLINGUAL EVERY 5 MIN PRN
Qty: 30 TABLET | Refills: 3 | Status: SHIPPED | OUTPATIENT
Start: 2024-06-17

## 2024-06-17 RX ORDER — CYANOCOBALAMIN 1000 UG/ML
1000 INJECTION, SOLUTION INTRAMUSCULAR; SUBCUTANEOUS
Qty: 3 ML | Refills: 3 | Status: SHIPPED | OUTPATIENT
Start: 2024-06-17

## 2024-06-17 RX ORDER — ATORVASTATIN CALCIUM 40 MG/1
40 TABLET, FILM COATED ORAL NIGHTLY
Qty: 90 TABLET | Refills: 3 | Status: SHIPPED | OUTPATIENT
Start: 2024-06-17

## 2024-06-17 RX ORDER — GABAPENTIN 300 MG/1
300 CAPSULE ORAL DAILY PRN
Qty: 90 CAPSULE | Refills: 1 | Status: SHIPPED | OUTPATIENT
Start: 2024-06-17

## 2024-06-17 RX ORDER — CHOLECALCIFEROL (VITAMIN D3) 50 MCG
1 TABLET ORAL DAILY
Qty: 180 EACH | Refills: 3 | COMMUNITY
Start: 2024-06-17

## 2024-06-17 RX ORDER — ALBUTEROL SULFATE 90 UG/1
1-2 AEROSOL, METERED RESPIRATORY (INHALATION) EVERY 6 HOURS PRN
Qty: 18 G | Refills: 11 | Status: SHIPPED | OUTPATIENT
Start: 2024-06-17

## 2024-06-17 RX ORDER — CYCLOBENZAPRINE HCL 10 MG
10 TABLET ORAL NIGHTLY PRN
Qty: 90 TABLET | Refills: 2 | Status: SHIPPED | OUTPATIENT
Start: 2024-06-17

## 2024-06-17 RX ORDER — IPRATROPIUM BROMIDE AND ALBUTEROL SULFATE 2.5; .5 MG/3ML; MG/3ML
3 SOLUTION RESPIRATORY (INHALATION) EVERY 4 HOURS PRN
Qty: 75 ML | Refills: 11 | Status: SHIPPED | OUTPATIENT
Start: 2024-06-17

## 2024-06-17 RX ORDER — ASPIRIN 81 MG/1
81 TABLET ORAL NIGHTLY
Qty: 90 TABLET | Refills: 3 | Status: SHIPPED | OUTPATIENT
Start: 2024-06-17

## 2024-06-17 NOTE — PROGRESS NOTES
Subjective:       Patient ID: Syl Matos is a 68 y.o. female.    Chief Complaint: Medication Refill  -  The pt is a 69 y/o female presents to [facility]. C/o RLE brooks x 5 yrs. Diabetes not controlled. Polypharmacy discussed. Not taking an ace, is on statin.     HCC:  Depression: feels controlled with lexapro 20 mg qd. Will monitor.  -  Past Medical History:   Diagnosis Date    Atrial fibrillation     CKD (chronic kidney disease)     COPD (chronic obstructive pulmonary disease)     Diabetes mellitus     Hypercholesteremia     Hypertension        Past Surgical History:   Procedure Laterality Date    FRACTURE SURGERY      pelvis surgery    SHOULDER SURGERY Left     TUBAL LIGATION          Social History     Socioeconomic History    Marital status:    Tobacco Use    Smoking status: Never    Smokeless tobacco: Never   Substance and Sexual Activity    Alcohol use: No    Drug use: No    Sexual activity: Not Currently     Partners: Male       Family History   Problem Relation Name Age of Onset    Heart disease Mother      Breast cancer Mother      Heart disease Father         Review of patient's allergies indicates:   Allergen Reactions    Iodine and iodide containing products Hives          Current Outpatient Medications:     LANTUS U-100 INSULIN 100 unit/mL injection, Inject 50 Units into the skin every evening., Disp: , Rfl:     omega-3 acid ethyl esters (LOVAZA) 1 gram capsule, Take 2 capsules by mouth 2 (two) times daily., Disp: , Rfl:     OZEMPIC 0.25 mg or 0.5 mg (2 mg/3 mL) pen injector, 0.5 mg., Disp: , Rfl:     albuterol (PROVENTIL/VENTOLIN HFA) 90 mcg/actuation inhaler, Inhale 1-2 puffs into the lungs every 6 (six) hours as needed for Wheezing or Shortness of Breath. Rescue, Disp: 18 g, Rfl: 11    albuterol-ipratropium (DUO-NEB) 2.5 mg-0.5 mg/3 mL nebulizer solution, Take 3 mLs by nebulization every 4 (four) hours as needed for Wheezing. Rescue, Disp: 75 mL, Rfl: 11    aspirin (ECOTRIN) 81 MG EC  tablet, Take 1 tablet (81 mg total) by mouth every evening. heart, Disp: 90 tablet, Rfl: 3    atorvastatin (LIPITOR) 40 MG tablet, Take 1 tablet (40 mg total) by mouth every evening. cholesterol, Disp: 90 tablet, Rfl: 3    busPIRone (BUSPAR) 15 MG tablet, Take 1 tablet (15 mg total) by mouth 2 (two) times daily. Buspar, Disp: 180 tablet, Rfl: 3    cholecalciferol, vitamin D3, (VITAMIN D3) 50 mcg (2,000 unit) Tab, Take 1 tablet (2,000 Units total) by mouth once daily., Disp: 180 each, Rfl: 3    cyanocobalamin 1,000 mcg/mL injection, Inject 1 mL (1,000 mcg total) into the skin every 30 days., Disp: 3 mL, Rfl: 3    cyclobenzaprine (FLEXERIL) 10 MG tablet, Take 1 tablet (10 mg total) by mouth nightly as needed for Muscle spasms. Muscle pain, Disp: 90 tablet, Rfl: 2    empagliflozin (JARDIANCE) 10 mg tablet, Take 1 tablet (10 mg total) by mouth every morning. Diabetes, Disp: 90 tablet, Rfl: 3    EScitalopram oxalate (LEXAPRO) 20 MG tablet, Take 1 tablet (20 mg total) by mouth every morning. Depression/anxiety, Disp: 90 tablet, Rfl: 3    gabapentin (NEURONTIN) 300 MG capsule, Take 1 capsule (300 mg total) by mouth daily as needed (neuropathy). Neuropathy, Disp: 90 capsule, Rfl: 1    metFORMIN (GLUCOPHAGE) 1000 MG tablet, Take 1 tablet (1,000 mg total) by mouth 2 (two) times daily. Diabetes, Disp: 180 tablet, Rfl: 3    metoprolol tartrate (LOPRESSOR) 50 MG tablet, Take 1 tablet (50 mg total) by mouth 2 (two) times daily., Disp: 180 tablet, Rfl: 3    nitroGLYCERIN (NITROSTAT) 0.4 MG SL tablet, Place 1 tablet (0.4 mg total) under the tongue every 5 (five) minutes as needed for Chest pain., Disp: 30 tablet, Rfl: 3    oxybutynin (DITROPAN-XL) 10 MG 24 hr tablet, Take 1 tablet (10 mg total) by mouth once daily., Disp: 90 tablet, Rfl: 3    traZODone (DESYREL) 50 MG tablet, Take 1 tablet (50 mg total) by mouth nightly as needed for Insomnia., Disp: 90 tablet, Rfl: 3    WIXELA INHUB 250-50 mcg/dose diskus inhaler, Inhale 1 puff  into the lungs 2 (two) times a day., Disp: 180 each, Rfl: 3    Medication Refill      Review of Systems   Constitutional: Negative.    HENT: Negative.     Eyes: Negative.    Respiratory: Negative.     Cardiovascular: Negative.    Gastrointestinal: Negative.    Endocrine: Negative.    Genitourinary: Negative.    Musculoskeletal: Negative.         RLE pain for years   Skin: Negative.    Allergic/Immunologic: Negative.    Neurological: Negative.    Hematological: Negative.    Psychiatric/Behavioral: Negative.         Objective:      Physical Exam  Vitals and nursing note reviewed. Exam conducted with a chaperone present (female).   Constitutional:       General: She is not in acute distress.     Appearance: Normal appearance. She is well-developed. She is obese. She is not ill-appearing.   HENT:      Head: Normocephalic.   Eyes:      Conjunctiva/sclera: Conjunctivae normal.   Neck:      Thyroid: No thyromegaly.   Cardiovascular:      Rate and Rhythm: Normal rate and regular rhythm.      Heart sounds: Normal heart sounds. No murmur heard.  Pulmonary:      Effort: Pulmonary effort is normal.      Breath sounds: Normal breath sounds. No wheezing or rales.   Musculoskeletal:         General: Normal range of motion.      Cervical back: Normal range of motion.      Right lower leg: No edema.      Left lower leg: No edema.      Comments: Sm nodule felt over shin   Skin:     General: Skin is warm and dry.   Neurological:      Mental Status: She is alert and oriented to person, place, and time. Mental status is at baseline.   Psychiatric:         Mood and Affect: Mood normal.         Behavior: Behavior normal.         Thought Content: Thought content normal.         Judgment: Judgment normal.         Assessment:       1. Encounter to establish care    2. Hypertension associated with diabetes    3. Anxiety and depression    4. B12 deficiency    5. Vitamin D deficiency    6. PAF (paroxysmal atrial fibrillation)    7. Panlobular  emphysema    8. Primary insomnia    9. OAB (overactive bladder)    10. Type 2 diabetes mellitus with hyperglycemia, with long-term current use of insulin    11. Major depressive disorder, single episode, in full remission    12. Graves' disease in remission    13. Need for hepatitis C screening test    14. Encounter for screening for human immunodeficiency virus (HIV)    15. Breast cancer screening by mammogram    16. Right leg pain    17. Iron deficiency anemia secondary to inadequate dietary iron intake        Plan:     1- enrolled in digital med both DM/HTN  2- fasting labs today  3- DM eye exam 8/2023 @ [facility]  4-colonoscopy [facility] less than 10 yrs ago.  5-mammo after 7/4  6- R tib/fib xray for pain x 5 yrs  -  Will discuss med changes at follow up and after labs viewed.  -  Encounter to establish care    Hypertension associated with diabetes  -     metFORMIN (GLUCOPHAGE) 1000 MG tablet; Take 1 tablet (1,000 mg total) by mouth 2 (two) times daily. Diabetes  Dispense: 180 tablet; Refill: 3  -     gabapentin (NEURONTIN) 300 MG capsule; Take 1 capsule (300 mg total) by mouth daily as needed (neuropathy). Neuropathy  Dispense: 90 capsule; Refill: 1  -     empagliflozin (JARDIANCE) 10 mg tablet; Take 1 tablet (10 mg total) by mouth every morning. Diabetes  Dispense: 90 tablet; Refill: 3  -     atorvastatin (LIPITOR) 40 MG tablet; Take 1 tablet (40 mg total) by mouth every evening. cholesterol  Dispense: 90 tablet; Refill: 3  -     Diabetes Digital Medicine (DDMP) Enrollment Order  -     Hypertension Digital Medicine (HDMP) Enrollment Order  -     Hemoglobin A1C; Future; Expected date: 06/17/2024  -     Lipid Panel; Future; Expected date: 06/17/2024  -     CBC Auto Differential; Future; Expected date: 06/17/2024  -     Comprehensive Metabolic Panel; Future; Expected date: 06/17/2024  -     Microalbumin/Creatinine Ratio, Urine; Future; Expected date: 06/18/2024  -     Ferritin;  Future; Expected date: 06/18/2024  -     Iron and TIBC; Future; Expected date: 06/17/2024  -     Folate; Future; Expected date: 06/17/2024    Anxiety and depression  -     busPIRone (BUSPAR) 15 MG tablet; Take 1 tablet (15 mg total) by mouth 2 (two) times daily. Buspar  Dispense: 180 tablet; Refill: 3  -     EScitalopram oxalate (LEXAPRO) 20 MG tablet; Take 1 tablet (20 mg total) by mouth every morning. Depression/anxiety  Dispense: 90 tablet; Refill: 3    B12 deficiency  -     cyanocobalamin 1,000 mcg/mL injection; Inject 1 mL (1,000 mcg total) into the skin every 30 days.  Dispense: 3 mL; Refill: 3  -     Vitamin B12; Future; Expected date: 06/17/2024  -     Ferritin; Future; Expected date: 06/18/2024  -     Iron and TIBC; Future; Expected date: 06/17/2024  -     Folate; Future; Expected date: 06/17/2024    Vitamin D deficiency  -     cholecalciferol, vitamin D3, (VITAMIN D3) 50 mcg (2,000 unit) Tab; Take 1 tablet (2,000 Units total) by mouth once daily.  Dispense: 180 each; Refill: 3  -     Vitamin D; Future; Expected date: 06/17/2024    PAF (paroxysmal atrial fibrillation)  -     metoprolol tartrate (LOPRESSOR) 50 MG tablet; Take 1 tablet (50 mg total) by mouth 2 (two) times daily.  Dispense: 180 tablet; Refill: 3  -     aspirin (ECOTRIN) 81 MG EC tablet; Take 1 tablet (81 mg total) by mouth every evening. heart  Dispense: 90 tablet; Refill: 3  -     Magnesium; Future; Expected date: 06/17/2024  -     Ferritin; Future; Expected date: 06/18/2024  -     Iron and TIBC; Future; Expected date: 06/17/2024  -     Folate; Future; Expected date: 06/17/2024    Panlobular emphysema  -     WIXELA INHUB 250-50 mcg/dose diskus inhaler; Inhale 1 puff into the lungs 2 (two) times a day.  Dispense: 180 each; Refill: 3  -     albuterol-ipratropium (DUO-NEB) 2.5 mg-0.5 mg/3 mL nebulizer solution; Take 3 mLs by nebulization every 4 (four) hours as needed for Wheezing. Rescue  Dispense: 75 mL; Refill: 11  -     albuterol  (PROVENTIL/VENTOLIN HFA) 90 mcg/actuation inhaler; Inhale 1-2 puffs into the lungs every 6 (six) hours as needed for Wheezing or Shortness of Breath. Rescue  Dispense: 18 g; Refill: 11    Primary insomnia  -     traZODone (DESYREL) 50 MG tablet; Take 1 tablet (50 mg total) by mouth nightly as needed for Insomnia.  Dispense: 90 tablet; Refill: 3    OAB (overactive bladder)  -     oxybutynin (DITROPAN-XL) 10 MG 24 hr tablet; Take 1 tablet (10 mg total) by mouth once daily.  Dispense: 90 tablet; Refill: 3    Type 2 diabetes mellitus with hyperglycemia, with long-term current use of insulin    Major depressive disorder, single episode, in full remission    Graves' disease in remission  -     TSH; Future; Expected date: 06/17/2024  -     T4, Free; Future; Expected date: 06/17/2024    Need for hepatitis C screening test  -     Hepatitis C Antibody; Future; Expected date: 06/17/2024    Encounter for screening for human immunodeficiency virus (HIV)  -     HIV 1/2 Ag/Ab (4th Gen); Future; Expected date: 06/18/2024    Breast cancer screening by mammogram  -     Mammo Digital Screening Bilat w/ Manolo; Future; Expected date: 07/05/2024    Right leg pain  -     X-Ray Tibia Fibula 2 View Right; Future; Expected date: 06/17/2024    Iron deficiency anemia secondary to inadequate dietary iron intake  -     Ferritin; Future; Expected date: 06/18/2024  -     Iron and TIBC; Future; Expected date: 06/17/2024  -     Folate; Future; Expected date: 06/17/2024    Other orders  -     nitroGLYCERIN (NITROSTAT) 0.4 MG SL tablet; Place 1 tablet (0.4 mg total) under the tongue every 5 (five) minutes as needed for Chest pain.  Dispense: 30 tablet; Refill: 3  -     cyclobenzaprine (FLEXERIL) 10 MG tablet; Take 1 tablet (10 mg total) by mouth nightly as needed for Muscle spasms. Muscle pain  Dispense: 90 tablet; Refill: 2        Risks, benefits, and side effects were discussed with the patient. All questions were answered to the fullest satisfaction  of the patient, and pt verbalized understanding and agreement to treatment plan. Pt was to call with any new or worsening symptoms, or present to the ER.

## 2024-06-19 ENCOUNTER — PATIENT OUTREACH (OUTPATIENT)
Dept: ADMINISTRATIVE | Facility: HOSPITAL | Age: 69
End: 2024-06-19
Payer: MEDICARE

## 2024-06-19 DIAGNOSIS — Z78.0 MENOPAUSE: ICD-10-CM

## 2024-06-19 NOTE — LETTER
"       AUTHORIZATION FOR RELEASE OF   CONFIDENTIAL INFORMATION    Dear [facility] Medical Records,    We are seeing Syl Matos, date of birth 1955, in the clinic at [facility] 2ND FLOOR FAMILY MEDICINE. Africa Altamirano NP is the patient's PCP. Syl Matos has an outstanding lab/procedure at the time we reviewed her chart. In order to help keep her health information updated, she has authorized us to request the following medical record(s):                                           ( X )  COLONOSCOPY          Please fax records to [facility], [place]-Karamat, Kimberly A., NP, [phone] or [phone].        Thank you  Candice Ventura LPN  Clinical Care Coordinator  [facility]            Patient Name: Syl Matos  : 1955  Patient Phone #: [phone]                      A. Consent for Examination and Treatment: I hereby authorize the providers and employees of [facility] ("[facility] to provide medical treatment/services which includes, but is not limited to, performing and administering tests and diagnostic procedures that are deemed necessary, Including, but not limited to, imaging examinations, blood tests and other laboratory procedures as may be required by the hospital, clinic, or may be ordered by my physician(s) or persons working under the general and/or special instructions of my physician(s).                   1.      I understand and agree that this consent covers all authorized persons, including but not limited to physicians, residents, nurse practitioners, physicians' assistants, specialists, consultants, student nurses, and independently contracted physicians, who are called upon by the physician in charge, to carry out the diagnostic procedures and medical or surgical treatment.  2.      I hereby authorize [facility] to retain or dispose of any specimens or tissue, should there be such remaining from any test or " procedure.  3.      I hereby authorize and give consent for [facility] providers and employees to take photographs, images or videotapes of such diagnostic, surgical or treatment procedures of Patient as may be required by [facility] or as may be ordered by a physician.  I further acknowledge and agree that [facility] may use cameras or other devices for patient monitoring.  4.      I am aware that the practice of medicine is not an exact science, and I acknowledge that no guarantees have been made to me as to the outcome of any tests, procedures or treatment.                   B. Authorization for Release of Information:  I understand that my insurance company and/or their agents may need information necessary to make determinations about payment/reimbursement.  I hereby provide authorization to release to all insurance companies, their successors, assignees, other parties with whom they may have contracted, or others acting on their behalf, that are involved with payment for any hospital and/or clinic charges incurred by the patient, any information that they request and deem necessary for payment/reimbursement, and/or quality review.  I further authorize the release of my health information to physicians or other health care practitioners on staff who are involved in my health care now and in the future, and to other health care providers, entities, or institutions for the purpose of my continued care and treatment, including referrals.     C. Medicare Patient's Certification and Authorization to Release Information and Payment Request:  I certify that the information given by me in applying for payment under Title XVIII of the Social Security Act is correct.  I authorize any murillo of medical or other information about me to release to the Social Security Administration, or its intermediaries or carriers, any information needed for this or a related Medicare claim.  I request that payment of authorized benefits be made  on my behalf.     D. Assignment of Insurance Benefits:  I hereby authorize any and all insurance companies, health plans, defined benefit plans, health insurers or any entity that is or may be responsible for payment of my medical expenses to pay all hospital and medical benefits now due, and to become due and payable to me under any hospital benefits, sick benefits, injury benefits or any other benefit for services rendered to me, including Major Medical Benefits, direct to [facility] and all independently contracted physicians.  I assign any and all rights that I may have against any and all insurance companies, health plans, defined benefit plans, health insurers or any entity that is or may be responsible for payment of my medical expenses, including, but not limited to any right to appeal a denial of a claim, any right to bring any action, lawsuit, administrative proceeding, or other cause of action on my behalf.  I specifically assign my right to pursue litigation against any and all insurance companies, health plans, defined benefit plans, health insurers or any entity that is or may be responsible for payment of medical expenses based upon a refusal to pay charges.              REGISTRATION  AUTHORIZATION                    E. Valuables:  It is understood and agreed that [facility] is not liable for the damage to or loss of any money, jewelry, documents, dentures, eye glasses, hearing aids, prosthetics, or other property of value.     F. Computer Equipment:  I understand and agree that should I choose to use computer equipment owned by [facility] or if I choose to access the Internet via [facility]'s network, I do so at my own risk.  [facility] is not responsible for any damage to my computer equipment or to any damages of any type that might arise from my loss of equipment or data.                   G. Acceptance of Financial Responsibility:  I agree that in consideration of the services and supplies that have been or  will be furnished to the patient,  I am hereby obligated to pay all charges made for or on the account of the patient according to the standard rates (in effect at the time the services and supplies are delivered) established by [facility], including its Patient Financial Assistance Policy to the extent it is applicable.  I understand that I am responsible for all charges, or portions thereof, not covered by insurance or other sources.  Patient refunds will be distributed only after balances at all [facility] facilities are paid.                   H. Communication Authorization:  I hereby authorize [facility] and its representatives, along with any billing service or  who may work on their behalf, to contact me on my cell phone and/or home phone using prerecorded messages, artificial voice messages, automatic telephone dialing devices or other computer assisted technology, or by electronic mail, text messaging, or by any other form of electronic communication.  This includes, but is not limited to appointment reminders, yearly physical exam reminders, preventive care reminders, patient campaigns, welcome calls, and calls about account balances on my account or any account on which I am listed as a guarantor.  I understand I have the right to opt out of these communications at any time.     I.  Relationship Between Facility and Physician:  I understand that some, but not all, providers furnishing services to the patient are not employees or agents of [facility].  The patient is under the care and supervision of his/her attending physician, and it is the responsibility of the facility and its nursing staff to carry out the instructions of such physicians.  It is the responsibility of the patient's physician/designee to obtain the patient's informed consent, when required, for medical or surgical treatment, special diagnostic or therapeutic procedures, or hospital services rendered for the patient under the special  instructions of the physician/designee.     J. Notice of Private Practices:  I acknowledge I have received a copy of [facility]'s Notice of Privacy Practices.     K. Facility Directory:  I have discussed with the organization my desire to be either included or excluded in the facility directory.  I understand that if my choice is to opt-out of being identified in the facility directory that the facility will not provide any information about me such as my condition (e.g. Fair, stable, etc.) or my location in the facility (e.g. Room number, department).     L. LINKS:  For [place] Residents:  [facility] is a LINKS ([place] [facility]) participating facility.  LINKS is a [facility]-sponsored confidential computer system that helps you and your doctor keep track of you and your child's immunization history.  I acknowledge that I am allowing [facility] to share this information with [facility].  For [place] Residents:  [facility] is a MIIX ([place] Immunization Information eXchange) participant.  [employer] is a [place] Department of Health-sponsored confidential computer system that helps you and your doctor keep track of you and your child's immunization history.  I acknowledge that I am allowing [facility] to share information with [employer].     M. Term:  This authorization is valid for this and subsequent care/treatment I receive at [facility] and will remain valid unless/until revoked in writing by me.      ACKNOWLEDGMENT OF POTENTIAL RISKS OF COVID-19 VACCINE  It is important that you, as the patient or patients legally authorized representative(s), understand and acknowledge the following, with regard to administration of the COVID-19 vaccine offered by [facility]:  The SARS-CoV-2 virus (COVID-19) has caused an unprecedented modern global pandemic that has mobilized scientists and drug manufacturers to work to create safe and effective vaccines to get the crisis under control.  No vaccine is released  in the [place] without undergoing rigorous, multi-layered testing and approval by the Food and Drug Administration.  During a public health emergency, however, vaccines can be released for patient administration by the FDA prior to completion of multi-phase clinical trials and approval. This is done by the FDAs granting of Emergency Use Authorization (EUA) when the vaccine meets reasonable thresholds for safety and effectiveness and people are in urgent need of care. Under an EUA, the FDA has found that known and potential benefits outweigh its known and potential risks.  The vaccine for which you are presenting to [facility] has been released under an EUA, which [facility] is honoring in its distribution of the vaccine to the public. While the FDAs authorization indicates its belief that usage is recommended over possible risks, there is still the possibility that unknown risks of the vaccine could exist.  By signing this document, you acknowledge and assume these risks. Further, you waive any and all claims of liability against and hold harmless any [facility] entity or provider for any harm caused to you by said possible unknown risks of the vaccine.        N. [facility]:  As used in this document, [facility] means all [facility] affiliated entities including all health centers, surgery centers, clinics, and hospitals.  It includes more specifically, the following entities: [facility], a not for profit [facility], and its subsidiaries and affiliates, including [facility], [facility], [facility], [facility], [facility], [facility], [facility], [facility], [facility]d/b/a [facility], L.L.C.d/b/a [facility] [employer], [employer] As manager  of [facility], [facility], L[facility]LSANTI, [facility] [employer], L.L.C.d/b/a [facility], [facility], [facility] and [facility] as manager of [facility].                                                                Patient/Legal Guardian Signature                                                    This signature was collected at 07/03/2023      Syl Matos/Self                                                                            Printed Name/Relationship to Patient

## 2024-06-19 NOTE — LETTER
"       AUTHORIZATION FOR RELEASE OF   CONFIDENTIAL INFORMATION    Dear [facility],    We are seeing Syl Matos, date of birth 1955, in the clinic at [facility] 2ND FLOOR FAMILY MEDICINE. Africa Altamirano NP is the patient's PCP. Syl Matos has an outstanding lab/procedure at the time we reviewed her chart. In order to help keep her health information updated, she has authorized us to request the following medical record(s):                                                ( X )  EYE EXAM                Please fax records to [facility], Choina-Karamat, Kimberly A., NP, [phone] or [phone].        Thank you  Candice Ventura LPN  Clinical Care Coordinator  [facility]            Patient Name: Syl Matos  : 1955  Patient Phone #: [phone]                    A. Consent for Examination and Treatment: I hereby authorize the providers and employees of [facility] ("[facility] to provide medical treatment/services which includes, but is not limited to, performing and administering tests and diagnostic procedures that are deemed necessary, Including, but not limited to, imaging examinations, blood tests and other laboratory procedures as may be required by the hospital, clinic, or may be ordered by my physician(s) or persons working under the general and/or special instructions of my physician(s).                   1.      I understand and agree that this consent covers all authorized persons, including but not limited to physicians, residents, nurse practitioners, physicians' assistants, specialists, consultants, student nurses, and independently contracted physicians, who are called upon by the physician in charge, to carry out the diagnostic procedures and medical or surgical treatment.  2.      I hereby authorize [facility] to retain or dispose of any specimens or tissue, should there be such remaining from any test or procedure.  3.      I " hereby authorize and give consent for [facility] providers and employees to take photographs, images or videotapes of such diagnostic, surgical or treatment procedures of Patient as may be required by [facility] or as may be ordered by a physician.  I further acknowledge and agree that [facility] may use cameras or other devices for patient monitoring.  4.      I am aware that the practice of medicine is not an exact science, and I acknowledge that no guarantees have been made to me as to the outcome of any tests, procedures or treatment.                   B. Authorization for Release of Information:  I understand that my insurance company and/or their agents may need information necessary to make determinations about payment/reimbursement.  I hereby provide authorization to release to all insurance companies, their successors, assignees, other parties with whom they may have contracted, or others acting on their behalf, that are involved with payment for any hospital and/or clinic charges incurred by the patient, any information that they request and deem necessary for payment/reimbursement, and/or quality review.  I further authorize the release of my health information to physicians or other health care practitioners on staff who are involved in my health care now and in the future, and to other health care providers, entities, or institutions for the purpose of my continued care and treatment, including referrals.     C. Medicare Patient's Certification and Authorization to Release Information and Payment Request:  I certify that the information given by me in applying for payment under Title XVIII of the Social Security Act is correct.  I authorize any murillo of medical or other information about me to release to the Social Security Administration, or its intermediaries or carriers, any information needed for this or a related Medicare claim.  I request that payment of authorized benefits be made on my behalf.     D.  Assignment of Insurance Benefits:  I hereby authorize any and all insurance companies, health plans, defined benefit plans, health insurers or any entity that is or may be responsible for payment of my medical expenses to pay all hospital and medical benefits now due, and to become due and payable to me under any hospital benefits, sick benefits, injury benefits or any other benefit for services rendered to me, including Major Medical Benefits, direct to [facility] and all independently contracted physicians.  I assign any and all rights that I may have against any and all insurance companies, health plans, defined benefit plans, health insurers or any entity that is or may be responsible for payment of my medical expenses, including, but not limited to any right to appeal a denial of a claim, any right to bring any action, lawsuit, administrative proceeding, or other cause of action on my behalf.  I specifically assign my right to pursue litigation against any and all insurance companies, health plans, defined benefit plans, health insurers or any entity that is or may be responsible for payment of medical expenses based upon a refusal to pay charges.              REGISTRATION  AUTHORIZATION                    E. Valuables:  It is understood and agreed that [facility] is not liable for the damage to or loss of any money, jewelry, documents, dentures, eye glasses, hearing aids, prosthetics, or other property of value.     F. Computer Equipment:  I understand and agree that should I choose to use computer equipment owned by [facility] or if I choose to access the Internet via [facility]'s network, I do so at my own risk.  [facility] is not responsible for any damage to my computer equipment or to any damages of any type that might arise from my loss of equipment or data.                   G. Acceptance of Financial Responsibility:  I agree that in consideration of the services and supplies that have been or will be furnished to the  patient,  I am hereby obligated to pay all charges made for or on the account of the patient according to the standard rates (in effect at the time the services and supplies are delivered) established by [facility], including its Patient Financial Assistance Policy to the extent it is applicable.  I understand that I am responsible for all charges, or portions thereof, not covered by insurance or other sources.  Patient refunds will be distributed only after balances at all [facility] facilities are paid.                   H. Communication Authorization:  I hereby authorize [facility] and its representatives, along with any billing service or  who may work on their behalf, to contact me on my cell phone and/or home phone using prerecorded messages, artificial voice messages, automatic telephone dialing devices or other computer assisted technology, or by electronic mail, text messaging, or by any other form of electronic communication.  This includes, but is not limited to appointment reminders, yearly physical exam reminders, preventive care reminders, patient campaigns, welcome calls, and calls about account balances on my account or any account on which I am listed as a guarantor.  I understand I have the right to opt out of these communications at any time.     I.  Relationship Between Facility and Physician:  I understand that some, but not all, providers furnishing services to the patient are not employees or agents of [facility].  The patient is under the care and supervision of his/her attending physician, and it is the responsibility of the facility and its nursing staff to carry out the instructions of such physicians.  It is the responsibility of the patient's physician/designee to obtain the patient's informed consent, when required, for medical or surgical treatment, special diagnostic or therapeutic procedures, or hospital services rendered for the patient under the special instructions of the  physician/designee.     J. Notice of Private Practices:  I acknowledge I have received a copy of [facility]'s Notice of Privacy Practices.     K. Facility Directory:  I have discussed with the organization my desire to be either included or excluded in the facility directory.  I understand that if my choice is to opt-out of being identified in the facility directory that the facility will not provide any information about me such as my condition (e.g. Fair, stable, etc.) or my location in the facility (e.g. Room number, department).     L. LINKS:  For [place] Residents:  [facility] is a LINKS ([place] Immunization Network for Kids Statewide) participating facility.  LINKS is a [facility]-sponsored confidential computer system that helps you and your doctor keep track of you and your child's immunization history.  I acknowledge that I am allowing [facility] to share this information with [facility].  For [place] Residents:  [facility] is a MIIX ([place] Immunization Information eXchange) participant.  MIPicBadges is a [place] Department of Health-sponsored confidential computer system that helps you and your doctor keep track of you and your child's immunization history.  I acknowledge that I am allowing [facility] to share information with [facility].     M. Term:  This authorization is valid for this and subsequent care/treatment I receive at [facility] and will remain valid unless/until revoked in writing by me.      ACKNOWLEDGMENT OF POTENTIAL RISKS OF COVID-19 VACCINE  It is important that you, as the patient or patients legally authorized representative(s), understand and acknowledge the following, with regard to administration of the COVID-19 vaccine offered by [facility]:  The SARS-CoV-2 virus (COVID-19) has caused an unprecedented modern global pandemic that has mobilized scientists and drug manufacturers to work to create safe and effective vaccines to get the crisis under control.  No vaccine is released in the [place]  without undergoing rigorous, multi-layered testing and approval by the Food and Drug Administration.  During a public health emergency, however, vaccines can be released for patient administration by the FDA prior to completion of multi-phase clinical trials and approval. This is done by the FDAs granting of Emergency Use Authorization (EUA) when the vaccine meets reasonable thresholds for safety and effectiveness and people are in urgent need of care. Under an EUA, the FDA has found that known and potential benefits outweigh its known and potential risks.  The vaccine for which you are presenting to [facility] has been released under an EUA, which [facility] is honoring in its distribution of the vaccine to the public. While the FDAs authorization indicates its belief that usage is recommended over possible risks, there is still the possibility that unknown risks of the vaccine could exist.  By signing this document, you acknowledge and assume these risks. Further, you waive any and all claims of liability against and hold harmless any [facility] entity or provider for any harm caused to you by said possible unknown risks of the vaccine.        N. [facility]:  As used in this document, [facility] means all [facility] affiliated entities including all health centers, surgery centers, clinics, and hospitals.  It includes more specifically, the following entities: [facility], a not for profit [facility], and its subsidiaries and affiliates, including [facility], [facility], [facility], [facility], [facility], [facility]LSANTI., [facility], L.L.C.d/b/a [facility], L.L.C.d/b/a [facility] [employer] [employer] As manager of Christiano Hidalgo  [facility], [facility], [facility]I, [facility], LRamonaLSANTI.d/b/a [facility], [facility] L[facility]C., and [facility] as manager of [facility].                                                                Patient/Legal Guardian Signature                                                    This signature was collected at 07/03/2023      Syl Matos/Self                                                                            Printed Name/Relationship to Patient

## 2024-06-22 ENCOUNTER — PATIENT MESSAGE (OUTPATIENT)
Dept: FAMILY MEDICINE | Facility: CLINIC | Age: 69
End: 2024-06-22
Payer: MEDICARE

## 2024-06-24 ENCOUNTER — PATIENT MESSAGE (OUTPATIENT)
Dept: FAMILY MEDICINE | Facility: CLINIC | Age: 69
End: 2024-06-24
Payer: MEDICARE

## 2024-06-24 ENCOUNTER — PATIENT OUTREACH (OUTPATIENT)
Dept: ADMINISTRATIVE | Facility: HOSPITAL | Age: 69
End: 2024-06-24
Payer: MEDICARE

## 2024-06-24 NOTE — PROGRESS NOTES
Population Health Chart Review & Patient Outreach Details      Additional [facility] Notes:               Updates Requested / Reviewed:      Updated Care Coordination Note         Health Maintenance Topics Overdue:      VBHM Score: 3     Colon Cancer Screening  Osteoporosis Screening  Eye Exam    Pneumonia Vaccine  Tetanus Vaccine  RSV Vaccine                  Health Maintenance Topic(s) Outreach Outcomes & Actions Taken:    Eye Exam - Outreach Outcomes & Actions Taken  : Diabetic Eye External Records Uploaded, Care Team & History Updated if Applicable

## 2024-06-28 ENCOUNTER — PATIENT OUTREACH (OUTPATIENT)
Dept: ADMINISTRATIVE | Facility: HOSPITAL | Age: 69
End: 2024-06-28
Payer: MEDICARE

## 2024-06-28 NOTE — PROGRESS NOTES
Population Health Chart Review & Patient Outreach Details      Additional [facility] Notes:               Updates Requested / Reviewed:      Updated Care Coordination Note, Care Everywhere, and Immunizations Reconciliation Completed or Queried: [facility] Topics Overdue:      [facility] Score: 3     Colon Cancer Screening  Osteoporosis Screening  Eye Exam    Pneumonia Vaccine  Tetanus Vaccine  RSV Vaccine                  Health Maintenance Topic(s) Outreach Outcomes & Actions Taken:    Colorectal Cancer Screening - Outreach Outcomes & Actions Taken  : External Records Requested & Care Team Updated if Applicable    Eye Exam - Outreach Outcomes & Actions Taken  : External Records Requested & Care Team Updated if Applicable

## 2024-07-09 ENCOUNTER — PATIENT MESSAGE (OUTPATIENT)
Dept: FAMILY MEDICINE | Facility: CLINIC | Age: 69
End: 2024-07-09
Payer: MEDICARE

## 2024-07-09 ENCOUNTER — HOSPITAL ENCOUNTER (OUTPATIENT)
Dept: RADIOLOGY | Facility: HOSPITAL | Age: 69
Discharge: HOME | End: 2024-07-09
Attending: NURSE PRACTITIONER
Payer: MEDICARE

## 2024-07-09 DIAGNOSIS — Z12.31 BREAST CANCER SCREENING BY MAMMOGRAM: ICD-10-CM

## 2024-07-09 DIAGNOSIS — R92.8 ABNORMAL MAMMOGRAM OF RIGHT BREAST: Primary | ICD-10-CM

## 2024-07-09 PROCEDURE — 77063 BREAST TOMOSYNTHESIS BI: CPT | Mod: 26,,, | Performed by: RADIOLOGY

## 2024-07-09 PROCEDURE — 77067 SCR MAMMO BI INCL CAD: CPT | Mod: TC

## 2024-07-09 PROCEDURE — 77067 SCR MAMMO BI INCL CAD: CPT | Mod: 26,,, | Performed by: RADIOLOGY

## 2024-07-10 PROBLEM — Z79.4 TYPE 2 DIABETES MELLITUS WITHOUT COMPLICATION, WITH LONG-TERM CURRENT USE OF INSULIN: Status: ACTIVE | Noted: 2024-07-10

## 2024-07-10 PROBLEM — E11.9 TYPE 2 DIABETES MELLITUS WITHOUT COMPLICATION, WITH LONG-TERM CURRENT USE OF INSULIN: Status: ACTIVE | Noted: 2024-07-10

## 2024-07-24 ENCOUNTER — HOSPITAL ENCOUNTER (OUTPATIENT)
Dept: RADIOLOGY | Facility: HOSPITAL | Age: 69
Discharge: HOME | End: 2024-07-24
Attending: NURSE PRACTITIONER
Payer: MEDICARE

## 2024-07-24 DIAGNOSIS — Z78.0 MENOPAUSE: ICD-10-CM

## 2024-07-24 DIAGNOSIS — R92.8 ABNORMAL MAMMOGRAM OF RIGHT BREAST: ICD-10-CM

## 2024-07-24 PROCEDURE — 77065 DX MAMMO INCL CAD UNI: CPT | Mod: 26,RT,, | Performed by: RADIOLOGY

## 2024-07-24 PROCEDURE — 77091 TBS TECHL CALCULATION ONLY: CPT

## 2024-07-24 PROCEDURE — 77080 DXA BONE DENSITY AXIAL: CPT | Mod: TC

## 2024-07-24 PROCEDURE — 77061 BREAST TOMOSYNTHESIS UNI: CPT | Mod: 26,RT,, | Performed by: RADIOLOGY

## 2024-07-24 PROCEDURE — 76642 ULTRASOUND BREAST LIMITED: CPT | Mod: 26,RT,, | Performed by: RADIOLOGY

## 2024-07-24 PROCEDURE — 76642 ULTRASOUND BREAST LIMITED: CPT | Mod: TC,RT

## 2024-07-24 PROCEDURE — 77065 DX MAMMO INCL CAD UNI: CPT | Mod: TC,RT

## 2024-07-24 PROCEDURE — 77061 BREAST TOMOSYNTHESIS UNI: CPT | Mod: TC,RT

## 2024-07-29 ENCOUNTER — OFFICE VISIT (OUTPATIENT)
Dept: FAMILY MEDICINE | Facility: CLINIC | Age: 69
End: 2024-07-29
Payer: MEDICARE

## 2024-07-29 VITALS
HEART RATE: 72 BPM | BODY MASS INDEX: 34.23 KG/M2 | SYSTOLIC BLOOD PRESSURE: 117 MMHG | HEIGHT: 62 IN | DIASTOLIC BLOOD PRESSURE: 64 MMHG | WEIGHT: 186 LBS | OXYGEN SATURATION: 96 % | RESPIRATION RATE: 16 BRPM

## 2024-07-29 DIAGNOSIS — M79.604 RIGHT LEG PAIN: ICD-10-CM

## 2024-07-29 DIAGNOSIS — E55.9 VITAMIN D DEFICIENCY: ICD-10-CM

## 2024-07-29 DIAGNOSIS — I15.2 HYPERTENSION ASSOCIATED WITH DIABETES: Primary | ICD-10-CM

## 2024-07-29 DIAGNOSIS — E11.59 HYPERTENSION ASSOCIATED WITH DIABETES: Primary | ICD-10-CM

## 2024-07-29 PROCEDURE — 3008F BODY MASS INDEX DOCD: CPT | Mod: CPTII,S$GLB,, | Performed by: NURSE PRACTITIONER

## 2024-07-29 PROCEDURE — 3061F NEG MICROALBUMINURIA REV: CPT | Mod: CPTII,S$GLB,, | Performed by: NURSE PRACTITIONER

## 2024-07-29 PROCEDURE — 3078F DIAST BP <80 MM HG: CPT | Mod: CPTII,S$GLB,, | Performed by: NURSE PRACTITIONER

## 2024-07-29 PROCEDURE — 3051F HG A1C>EQUAL 7.0%<8.0%: CPT | Mod: CPTII,S$GLB,, | Performed by: NURSE PRACTITIONER

## 2024-07-29 PROCEDURE — 99999 PR PBB SHADOW E&M-EST. PATIENT-LVL V: CPT | Mod: PBBFAC,,, | Performed by: NURSE PRACTITIONER

## 2024-07-29 PROCEDURE — 99214 OFFICE O/P EST MOD 30 MIN: CPT | Mod: S$GLB,,, | Performed by: NURSE PRACTITIONER

## 2024-07-29 PROCEDURE — 4010F ACE/ARB THERAPY RXD/TAKEN: CPT | Mod: CPTII,S$GLB,, | Performed by: NURSE PRACTITIONER

## 2024-07-29 PROCEDURE — 3074F SYST BP LT 130 MM HG: CPT | Mod: CPTII,S$GLB,, | Performed by: NURSE PRACTITIONER

## 2024-07-29 PROCEDURE — 1126F AMNT PAIN NOTED NONE PRSNT: CPT | Mod: CPTII,S$GLB,, | Performed by: NURSE PRACTITIONER

## 2024-07-29 PROCEDURE — 1159F MED LIST DOCD IN RCRD: CPT | Mod: CPTII,S$GLB,, | Performed by: NURSE PRACTITIONER

## 2024-07-29 PROCEDURE — 1160F RVW MEDS BY RX/DR IN RCRD: CPT | Mod: CPTII,S$GLB,, | Performed by: NURSE PRACTITIONER

## 2024-07-29 PROCEDURE — 3066F NEPHROPATHY DOC TX: CPT | Mod: CPTII,S$GLB,, | Performed by: NURSE PRACTITIONER

## 2024-07-29 RX ORDER — LISINOPRIL 20 MG/1
20 TABLET ORAL DAILY
Qty: 90 TABLET | Refills: 3 | Status: SHIPPED | OUTPATIENT
Start: 2024-07-29 | End: 2025-07-29

## 2024-07-29 RX ORDER — TIZANIDINE 4 MG/1
4 TABLET ORAL EVERY 6 HOURS PRN
Qty: 30 TABLET | Refills: 5 | Status: SHIPPED | OUTPATIENT
Start: 2024-07-29

## 2024-07-29 RX ORDER — CHOLECALCIFEROL (VITAMIN D3) 1250 MCG
1 TABLET ORAL WEEKLY
Qty: 12 TABLET | Refills: 3 | Status: SHIPPED | OUTPATIENT
Start: 2024-07-29

## 2024-07-29 NOTE — PROGRESS NOTES
"Subjective:       Patient ID: Syl Matos is a 69 y.o. female.    Chief Complaint: Results (Pt following up in regards to results for mammogram/US and Bone density. She has no other concerns at this time. )  -  The pt is a 70 y/o female that presents for follow up. HTN and DM controlled yet not taking ACE/ARB only toprol. Last A1c 7.8 but since was started on ozempic and enc to inc metformin to bid. Discussed possibility of coming off of insulin by inc ozempic, inc jardiance, and add another oral agent.    RLE pain with tibial spine osteophyte formation.  Pain is mostly when sitting or sleeping. Denies pain with walking. States "ever since I had that alex horse in my right calf, the pain has never went away". Reports neg for blood clot.     Prescribed flexeril for qhs prn RLE pain reports am lethargy. Is not sure why taking trazodone thus have discussed polypharmacy at last visit. End to take muscle relaxor which will help leg pain and help insomnia.  -  Past Medical History:   Diagnosis Date    Atrial fibrillation 03/31/2022    CKD (chronic kidney disease)     COPD (chronic obstructive pulmonary disease)     Diabetes mellitus     Hypercholesteremia     Hypertension        Past Surgical History:   Procedure Laterality Date    FRACTURE SURGERY      pelvis surgery    SHOULDER SURGERY Left     TUBAL LIGATION          Social History     Socioeconomic History    Marital status:     Number of children: 4    Highest education level: 4th grade   Occupational History    Occupation: [employer]   Tobacco Use    Smoking status: Never    Smokeless tobacco: Never   Substance and Sexual Activity    Alcohol use: Not Currently     Comment: social rare    Drug use: Never    Sexual activity: Not Currently     Partners: Male       Family History   Problem Relation Name Age of Onset    Heart disease Mother      Breast cancer Mother      Heart disease Father         Review of patient's allergies indicates:   Allergen Reactions "    Iodine and iodide containing products Hives          Current Outpatient Medications:     albuterol (PROVENTIL/VENTOLIN HFA) 90 mcg/actuation inhaler, Inhale 1-2 puffs into the lungs every 6 (six) hours as needed for Wheezing or Shortness of Breath. Rescue, Disp: 18 g, Rfl: 11    albuterol-ipratropium (DUO-NEB) 2.5 mg-0.5 mg/3 mL nebulizer solution, Take 3 mLs by nebulization every 4 (four) hours as needed for Wheezing. Rescue, Disp: 75 mL, Rfl: 11    aspirin (ECOTRIN) 81 MG EC tablet, Take 1 tablet (81 mg total) by mouth every evening. heart, Disp: 90 tablet, Rfl: 3    atorvastatin (LIPITOR) 40 MG tablet, Take 1 tablet (40 mg total) by mouth every evening. cholesterol, Disp: 90 tablet, Rfl: 3    busPIRone (BUSPAR) 15 MG tablet, Take 1 tablet (15 mg total) by mouth 2 (two) times daily. Buspar, Disp: 180 tablet, Rfl: 3    cyanocobalamin 1,000 mcg/mL injection, Inject 1 mL (1,000 mcg total) into the skin every 30 days., Disp: 3 mL, Rfl: 3    empagliflozin (JARDIANCE) 10 mg tablet, Take 1 tablet (10 mg total) by mouth every morning. Diabetes, Disp: 90 tablet, Rfl: 3    EScitalopram oxalate (LEXAPRO) 20 MG tablet, Take 1 tablet (20 mg total) by mouth every morning. Depression/anxiety, Disp: 90 tablet, Rfl: 3    LANTUS U-100 INSULIN 100 unit/mL injection, Inject 50 Units into the skin every evening., Disp: , Rfl:     metFORMIN (GLUCOPHAGE) 1000 MG tablet, Take 1 tablet (1,000 mg total) by mouth 2 (two) times daily. Diabetes, Disp: 180 tablet, Rfl: 3    nitroGLYCERIN (NITROSTAT) 0.4 MG SL tablet, Place 1 tablet (0.4 mg total) under the tongue every 5 (five) minutes as needed for Chest pain., Disp: 30 tablet, Rfl: 3    oxybutynin (DITROPAN-XL) 10 MG 24 hr tablet, Take 1 tablet (10 mg total) by mouth once daily., Disp: 90 tablet, Rfl: 3    WIXELA INHUB 250-50 mcg/dose diskus inhaler, Inhale 1 puff into the lungs 2 (two) times a day., Disp: 180 each, Rfl: 3    cholecalciferol, vitamin D3, 1,250 mcg (50,000 unit) Tab, Take  1 tablet by mouth once a week., Disp: 12 tablet, Rfl: 3    lisinopriL (PRINIVIL,ZESTRIL) 20 MG tablet, Take 1 tablet (20 mg total) by mouth once daily., Disp: 90 tablet, Rfl: 3    semaglutide (OZEMPIC) 1 mg/dose (4 mg/3 mL), Inject 1 mg into the skin every 7 days., Disp: 3 mL, Rfl: 3    tiZANidine (ZANAFLEX) 4 MG tablet, Take 1 tablet (4 mg total) by mouth every 6 (six) hours as needed (calf pain)., Disp: 30 tablet, Rfl: 5    HPI  Review of Systems   Constitutional: Negative.    HENT: Negative.     Eyes: Negative.    Respiratory: Negative.     Cardiovascular: Negative.    Gastrointestinal: Negative.    Endocrine: Negative.    Genitourinary: Negative.    Musculoskeletal:         Right leg pain   Skin: Negative.    Allergic/Immunologic: Negative.    Neurological: Negative.    Hematological: Negative.    Psychiatric/Behavioral: Negative.         Objective:      Physical Exam  Vitals and nursing note reviewed. Chaperone present: ramsey henderson & mallorie Arana.   Constitutional:       General: She is not in acute distress.     Appearance: Normal appearance. She is well-developed. She is obese. She is not ill-appearing.   HENT:      Head: Normocephalic.   Eyes:      Conjunctiva/sclera: Conjunctivae normal.   Neck:      Thyroid: No thyromegaly.   Cardiovascular:      Rate and Rhythm: Normal rate and regular rhythm.      Heart sounds: Normal heart sounds. No murmur heard.  Pulmonary:      Effort: Pulmonary effort is normal.      Breath sounds: Normal breath sounds. No wheezing or rales.   Musculoskeletal:         General: Normal range of motion.      Cervical back: Normal range of motion.      Right lower leg: No edema.      Left lower leg: No edema.   Skin:     General: Skin is warm and dry.   Neurological:      Mental Status: She is alert and oriented to person, place, and time. Mental status is at baseline.   Psychiatric:         Mood and Affect: Mood normal.         Behavior: Behavior normal.         Thought Content:  Thought content normal.         Judgment: Judgment normal.         Assessment:       1. Hypertension associated with diabetes    2. Vitamin D deficiency    3. Right leg pain        Plan:     1- [facility]oscopy [facility] less than 10 yrs ago.  2- stop metoprolol   3- start lisinopril 20 mg daily  4- stop daily Vit D and start weekly  5- inc ozempic to 1 mg weekly  6- stop flexeril d/t am lethargy  7- start zanaflex. Can take 1/2 tab if needed.  8- stop gabapentin, not helping R-leg.  9- stop lovaza   10-ref to ortho for RLE eval  11- RTC around 9/27 with NF labs before  --    Hypertension associated with diabetes  -     lisinopriL (PRINIVIL,ZESTRIL) 20 MG tablet; Take 1 tablet (20 mg total) by mouth once daily.  Dispense: 90 tablet; Refill: 3  -     semaglutide (OZEMPIC) 1 mg/dose (4 mg/3 mL); Inject 1 mg into the skin every 7 days.  Dispense: 3 mL; Refill: 3  -     Hemoglobin A1C; Future; Expected date: 07/29/2024  -     CBC Auto Differential; Future; Expected date: 07/29/2024  -     Comprehensive Metabolic Panel; Future; Expected date: 07/29/2024    Vitamin D deficiency  -     cholecalciferol, vitamin D3, 1,250 mcg (50,000 unit) Tab; Take 1 tablet by mouth once a week.  Dispense: 12 tablet; Refill: 3    Right leg pain  -     tiZANidine (ZANAFLEX) 4 MG tablet; Take 1 tablet (4 mg total) by mouth every 6 (six) hours as needed (calf pain).  Dispense: 30 tablet; Refill: 5  -     Ambulatory referral/consult to Orthopedics; Future; Expected date: 08/05/2024        Risks, benefits, and side effects were discussed with the patient. All questions were answered to the fullest satisfaction of the patient, and pt verbalized understanding and agreement to treatment plan. Pt was to call with any new or worsening symptoms, or present to the ER.

## 2024-07-29 NOTE — PATIENT INSTRUCTIONS
1-stop lovaza  2- stop metoprolol   3- start lisinopril 20 mg daily  4- stop daily Vit D and start weekly  5- inc ozempic to 1 mg weekly  6- stop flexeril d/t morning sleepiness  7- start zanaflex. Can take 1/2 tab if needed.  8- stop gabapentin, not helping R-leg.

## 2024-07-30 ENCOUNTER — PATIENT MESSAGE (OUTPATIENT)
Dept: FAMILY MEDICINE | Facility: CLINIC | Age: 69
End: 2024-07-30
Payer: MEDICARE

## 2024-07-30 ENCOUNTER — PATIENT OUTREACH (OUTPATIENT)
Dept: ADMINISTRATIVE | Facility: HOSPITAL | Age: 69
End: 2024-07-30
Payer: MEDICARE

## 2024-07-30 DIAGNOSIS — Z12.11 COLON CANCER SCREENING: Primary | ICD-10-CM

## 2024-07-30 NOTE — PROGRESS NOTES
Population Health Chart Review & Patient Outreach Details      Additional [facility] Notes:               Updates Requested / Reviewed:      Updated Care Coordination Note, Care Everywhere, and Immunizations Reconciliation Completed or Queried: [facility] Topics Overdue:      VB Score: 2     Colon Cancer Screening  Eye Exam    Pneumonia Vaccine  Tetanus Vaccine  RSV Vaccine                  Health Maintenance Topic(s) Outreach Outcomes & Actions Taken:    Colorectal Cancer Screening - Outreach Outcomes & Actions Taken  : External Records Uploaded, Care Team Updated, & History Updated if Applicable    Eye Exam - Outreach Outcomes & Actions Taken  : Pt Will Schedule with External Provider / Order Routed & Care Team Updated if Applicable-Scheduled on 08/01/24 at [facility]

## 2024-07-31 ENCOUNTER — PATIENT MESSAGE (OUTPATIENT)
Dept: GASTROENTEROLOGY | Facility: CLINIC | Age: 69
End: 2024-07-31
Payer: MEDICARE

## 2024-08-01 LAB
LEFT EYE DM RETINOPATHY: NEGATIVE
LEFT EYE DM RETINOPATHY: NEGATIVE
RIGHT EYE DM RETINOPATHY: NEGATIVE
RIGHT EYE DM RETINOPATHY: NEGATIVE

## 2024-08-02 ENCOUNTER — OFFICE VISIT (OUTPATIENT)
Dept: PODIATRY | Facility: CLINIC | Age: 69
End: 2024-08-02
Payer: MEDICARE

## 2024-08-02 VITALS
SYSTOLIC BLOOD PRESSURE: 124 MMHG | WEIGHT: 186.13 LBS | DIASTOLIC BLOOD PRESSURE: 76 MMHG | BODY MASS INDEX: 34.25 KG/M2 | HEIGHT: 62 IN | HEART RATE: 85 BPM

## 2024-08-02 DIAGNOSIS — M21.962 ACQUIRED DEFORMITY OF JOINT OF LEFT FOOT: ICD-10-CM

## 2024-08-02 DIAGNOSIS — E11.65 TYPE 2 DIABETES MELLITUS WITH HYPERGLYCEMIA, WITH LONG-TERM CURRENT USE OF INSULIN: ICD-10-CM

## 2024-08-02 DIAGNOSIS — L85.3 DRY SKIN: ICD-10-CM

## 2024-08-02 DIAGNOSIS — L84 PRE-ULCERATIVE CALLUSES: ICD-10-CM

## 2024-08-02 DIAGNOSIS — M21.961 ACQUIRED DEFORMITY OF JOINT OF RIGHT FOOT: Primary | ICD-10-CM

## 2024-08-02 DIAGNOSIS — Z79.4 TYPE 2 DIABETES MELLITUS WITH HYPERGLYCEMIA, WITH LONG-TERM CURRENT USE OF INSULIN: ICD-10-CM

## 2024-08-02 PROCEDURE — 1101F PT FALLS ASSESS-DOCD LE1/YR: CPT | Mod: CPTII,S$GLB,, | Performed by: PODIATRIST

## 2024-08-02 PROCEDURE — 3008F BODY MASS INDEX DOCD: CPT | Mod: CPTII,S$GLB,, | Performed by: PODIATRIST

## 2024-08-02 PROCEDURE — 1159F MED LIST DOCD IN RCRD: CPT | Mod: CPTII,S$GLB,, | Performed by: PODIATRIST

## 2024-08-02 PROCEDURE — 99999 PR PBB SHADOW E&M-EST. PATIENT-LVL V: CPT | Mod: PBBFAC,,, | Performed by: PODIATRIST

## 2024-08-02 PROCEDURE — 3061F NEG MICROALBUMINURIA REV: CPT | Mod: CPTII,S$GLB,, | Performed by: PODIATRIST

## 2024-08-02 PROCEDURE — 4010F ACE/ARB THERAPY RXD/TAKEN: CPT | Mod: CPTII,S$GLB,, | Performed by: PODIATRIST

## 2024-08-02 PROCEDURE — 3078F DIAST BP <80 MM HG: CPT | Mod: CPTII,S$GLB,, | Performed by: PODIATRIST

## 2024-08-02 PROCEDURE — 1126F AMNT PAIN NOTED NONE PRSNT: CPT | Mod: CPTII,S$GLB,, | Performed by: PODIATRIST

## 2024-08-02 PROCEDURE — 3066F NEPHROPATHY DOC TX: CPT | Mod: CPTII,S$GLB,, | Performed by: PODIATRIST

## 2024-08-02 PROCEDURE — 1160F RVW MEDS BY RX/DR IN RCRD: CPT | Mod: CPTII,S$GLB,, | Performed by: PODIATRIST

## 2024-08-02 PROCEDURE — 99213 OFFICE O/P EST LOW 20 MIN: CPT | Mod: S$GLB,,, | Performed by: PODIATRIST

## 2024-08-02 PROCEDURE — 3051F HG A1C>EQUAL 7.0%<8.0%: CPT | Mod: CPTII,S$GLB,, | Performed by: PODIATRIST

## 2024-08-02 PROCEDURE — 3288F FALL RISK ASSESSMENT DOCD: CPT | Mod: CPTII,S$GLB,, | Performed by: PODIATRIST

## 2024-08-02 PROCEDURE — 3074F SYST BP LT 130 MM HG: CPT | Mod: CPTII,S$GLB,, | Performed by: PODIATRIST

## 2024-08-02 RX ORDER — DICLOFENAC SODIUM 10 MG/G
2 GEL TOPICAL 3 TIMES DAILY
Qty: 100 G | Refills: 2 | Status: SHIPPED | OUTPATIENT
Start: 2024-08-02

## 2024-08-02 RX ORDER — SEMAGLUTIDE 0.68 MG/ML
0.5 INJECTION, SOLUTION SUBCUTANEOUS
COMMUNITY
Start: 2024-07-29

## 2024-08-05 DIAGNOSIS — M25.551 BILATERAL HIP PAIN: Primary | ICD-10-CM

## 2024-08-05 DIAGNOSIS — M25.552 BILATERAL HIP PAIN: Primary | ICD-10-CM

## 2024-08-06 ENCOUNTER — TELEPHONE (OUTPATIENT)
Dept: GASTROENTEROLOGY | Facility: CLINIC | Age: 69
End: 2024-08-06
Payer: MEDICARE

## 2024-08-07 ENCOUNTER — OFFICE VISIT (OUTPATIENT)
Dept: SPINE | Facility: CLINIC | Age: 69
End: 2024-08-07
Payer: MEDICARE

## 2024-08-07 VITALS — BODY MASS INDEX: 34.24 KG/M2 | WEIGHT: 186.06 LBS | HEIGHT: 62 IN

## 2024-08-07 DIAGNOSIS — M79.604 PAIN OF RIGHT LOWER EXTREMITY: Primary | ICD-10-CM

## 2024-08-07 PROCEDURE — 3066F NEPHROPATHY DOC TX: CPT | Mod: CPTII,S$GLB,, | Performed by: PHYSICAL MEDICINE & REHABILITATION

## 2024-08-07 PROCEDURE — 1159F MED LIST DOCD IN RCRD: CPT | Mod: CPTII,S$GLB,, | Performed by: PHYSICAL MEDICINE & REHABILITATION

## 2024-08-07 PROCEDURE — 4010F ACE/ARB THERAPY RXD/TAKEN: CPT | Mod: CPTII,S$GLB,, | Performed by: PHYSICAL MEDICINE & REHABILITATION

## 2024-08-07 PROCEDURE — 1125F AMNT PAIN NOTED PAIN PRSNT: CPT | Mod: CPTII,S$GLB,, | Performed by: PHYSICAL MEDICINE & REHABILITATION

## 2024-08-07 PROCEDURE — 3051F HG A1C>EQUAL 7.0%<8.0%: CPT | Mod: CPTII,S$GLB,, | Performed by: PHYSICAL MEDICINE & REHABILITATION

## 2024-08-07 PROCEDURE — 1101F PT FALLS ASSESS-DOCD LE1/YR: CPT | Mod: CPTII,S$GLB,, | Performed by: PHYSICAL MEDICINE & REHABILITATION

## 2024-08-07 PROCEDURE — 99999 PR PBB SHADOW E&M-EST. PATIENT-LVL IV: CPT | Mod: PBBFAC,,, | Performed by: PHYSICAL MEDICINE & REHABILITATION

## 2024-08-07 PROCEDURE — 3288F FALL RISK ASSESSMENT DOCD: CPT | Mod: CPTII,S$GLB,, | Performed by: PHYSICAL MEDICINE & REHABILITATION

## 2024-08-07 PROCEDURE — 99204 OFFICE O/P NEW MOD 45 MIN: CPT | Mod: S$GLB,,, | Performed by: PHYSICAL MEDICINE & REHABILITATION

## 2024-08-07 PROCEDURE — 1160F RVW MEDS BY RX/DR IN RCRD: CPT | Mod: CPTII,S$GLB,, | Performed by: PHYSICAL MEDICINE & REHABILITATION

## 2024-08-07 PROCEDURE — 3061F NEG MICROALBUMINURIA REV: CPT | Mod: CPTII,S$GLB,, | Performed by: PHYSICAL MEDICINE & REHABILITATION

## 2024-08-07 PROCEDURE — 3008F BODY MASS INDEX DOCD: CPT | Mod: CPTII,S$GLB,, | Performed by: PHYSICAL MEDICINE & REHABILITATION

## 2024-08-08 ENCOUNTER — PATIENT OUTREACH (OUTPATIENT)
Dept: ADMINISTRATIVE | Facility: HOSPITAL | Age: 69
End: 2024-08-08
Payer: MEDICARE

## 2024-08-12 ENCOUNTER — PATIENT OUTREACH (OUTPATIENT)
Dept: ADMINISTRATIVE | Facility: HOSPITAL | Age: 69
End: 2024-08-12
Payer: MEDICARE

## 2024-08-12 NOTE — PROGRESS NOTES
Population Health Chart Review & Patient Outreach Details      Additional [facility] Notes:               Updates Requested / Reviewed:      Updated Care Coordination Note and          Health Maintenance Topics Overdue:      VB Score: 1     Eye Exam    Pneumonia Vaccine  Tetanus Vaccine  RSV Vaccine                  Health Maintenance Topic(s) Outreach Outcomes & Actions Taken:    Eye Exam - Outreach Outcomes & Actions Taken  : Diabetic Eye External Records Uploaded, Care Team & History Updated if Applicable

## 2024-08-13 ENCOUNTER — PATIENT OUTREACH (OUTPATIENT)
Dept: ADMINISTRATIVE | Facility: HOSPITAL | Age: 69
End: 2024-08-13
Payer: MEDICARE

## 2024-08-13 NOTE — PROGRESS NOTES
Population Health Chart Review & Patient Outreach Details      Additional [facility] Health Notes:               Updates Requested / Reviewed:      Updated Care Coordination Note, Care Everywhere, Care Team Updated, and Immunizations Reconciliation Completed or Queried: [facility] Topics Overdue:      [facility] Score: 0     Patient is not due for any topics at this time.    Pneumonia Vaccine  Tetanus Vaccine  RSV Vaccine                  Health Maintenance Topic(s) Outreach Outcomes & Actions Taken:    Eye Exam - Outreach Outcomes & Actions Taken  : Diabetic Eye External Records Uploaded, Care Team & History Updated if Applicable-EYE EXAM PHOTOS

## 2024-08-20 ENCOUNTER — TELEPHONE (OUTPATIENT)
Dept: FAMILY MEDICINE | Facility: CLINIC | Age: 69
End: 2024-08-20
Payer: MEDICARE

## 2024-08-20 NOTE — TELEPHONE ENCOUNTER
I recommend 7 day trends of both BP and glucose to get a more accurate records. Please let them know neither numbers require immediate attention but are elevated. Thanks, Nikki

## 2024-08-20 NOTE — TELEPHONE ENCOUNTER
Spoke with patient  0730 blood glucose 199    Ozempic last injection 8/13/24, next due today  Jardiance 10 mg     /80 prior to taking medication    Current /88 0843    Pt taking lisinopril 20 mg once daily   Please advise if medication adjustment needed

## 2024-08-20 NOTE — TELEPHONE ENCOUNTER
----- Message from Raquel Lock sent at 8/20/2024  7:42 AM CDT -----  Contact: Pt daughter  Type: Needs Medical Advice         Who Called: Pt Daughter - Ely Matos  Brian Call Back Number: [phone]   Additional Information: Requesting a call back regarding  Daughter is asking for the office to call her. Pt Blood Sugar is 199  B.P is 152/80.   Please Advise- Thank you

## 2024-08-21 ENCOUNTER — HOSPITAL ENCOUNTER (OUTPATIENT)
Dept: RADIOLOGY | Facility: HOSPITAL | Age: 69
Discharge: HOME | End: 2024-08-21
Attending: PHYSICAL MEDICINE & REHABILITATION
Payer: MEDICARE

## 2024-08-21 ENCOUNTER — TELEPHONE (OUTPATIENT)
Dept: SPINE | Facility: CLINIC | Age: 69
End: 2024-08-21
Payer: MEDICARE

## 2024-08-21 DIAGNOSIS — M79.604 PAIN OF RIGHT LOWER EXTREMITY: ICD-10-CM

## 2024-08-21 PROCEDURE — 72148 MRI LUMBAR SPINE W/O DYE: CPT | Mod: TC

## 2024-08-21 PROCEDURE — 72148 MRI LUMBAR SPINE W/O DYE: CPT | Mod: 26,,, | Performed by: RADIOLOGY

## 2024-08-21 PROCEDURE — 93971 EXTREMITY STUDY: CPT | Mod: 26,RT,, | Performed by: RADIOLOGY

## 2024-08-21 PROCEDURE — 93971 EXTREMITY STUDY: CPT | Mod: TC,RT

## 2024-08-21 NOTE — TELEPHONE ENCOUNTER
Pt informed of results and recommendations. Pt has an appointment scheduled on 8/27/24 of which she was advised to keep.

## 2024-08-27 ENCOUNTER — TELEPHONE (OUTPATIENT)
Dept: PAIN MEDICINE | Facility: CLINIC | Age: 69
End: 2024-08-27
Payer: MEDICARE

## 2024-08-27 ENCOUNTER — OFFICE VISIT (OUTPATIENT)
Dept: SPINE | Facility: CLINIC | Age: 69
End: 2024-08-27
Payer: MEDICARE

## 2024-08-27 DIAGNOSIS — M54.16 LUMBAR RADICULOPATHY: Primary | ICD-10-CM

## 2024-08-27 DIAGNOSIS — M79.604 PAIN OF RIGHT LOWER EXTREMITY: Primary | ICD-10-CM

## 2024-08-27 PROCEDURE — 4010F ACE/ARB THERAPY RXD/TAKEN: CPT | Mod: CPTII,S$GLB,, | Performed by: PHYSICAL MEDICINE & REHABILITATION

## 2024-08-27 PROCEDURE — 3066F NEPHROPATHY DOC TX: CPT | Mod: CPTII,S$GLB,, | Performed by: PHYSICAL MEDICINE & REHABILITATION

## 2024-08-27 PROCEDURE — 3288F FALL RISK ASSESSMENT DOCD: CPT | Mod: CPTII,S$GLB,, | Performed by: PHYSICAL MEDICINE & REHABILITATION

## 2024-08-27 PROCEDURE — 1125F AMNT PAIN NOTED PAIN PRSNT: CPT | Mod: CPTII,S$GLB,, | Performed by: PHYSICAL MEDICINE & REHABILITATION

## 2024-08-27 PROCEDURE — 3051F HG A1C>EQUAL 7.0%<8.0%: CPT | Mod: CPTII,S$GLB,, | Performed by: PHYSICAL MEDICINE & REHABILITATION

## 2024-08-27 PROCEDURE — 1159F MED LIST DOCD IN RCRD: CPT | Mod: CPTII,S$GLB,, | Performed by: PHYSICAL MEDICINE & REHABILITATION

## 2024-08-27 PROCEDURE — 1160F RVW MEDS BY RX/DR IN RCRD: CPT | Mod: CPTII,S$GLB,, | Performed by: PHYSICAL MEDICINE & REHABILITATION

## 2024-08-27 PROCEDURE — 99213 OFFICE O/P EST LOW 20 MIN: CPT | Mod: S$GLB,,, | Performed by: PHYSICAL MEDICINE & REHABILITATION

## 2024-08-27 PROCEDURE — 1101F PT FALLS ASSESS-DOCD LE1/YR: CPT | Mod: CPTII,S$GLB,, | Performed by: PHYSICAL MEDICINE & REHABILITATION

## 2024-08-27 PROCEDURE — 3061F NEG MICROALBUMINURIA REV: CPT | Mod: CPTII,S$GLB,, | Performed by: PHYSICAL MEDICINE & REHABILITATION

## 2024-08-27 NOTE — TELEPHONE ENCOUNTER
----- Message from Lul Ruffin MD sent at 8/27/2024 11:49 AM CDT -----  Please schedule for interlaminar injection L5-S1

## 2024-08-27 NOTE — H&P (VIEW-ONLY)
SUBJECTIVE:    Patient ID: Syl Matos is a 69 y.o. female.    Chief Complaint: No chief complaint on file.    She is here to review her lumbar MRI done 08/21/2024 to evaluate her complaint of chronic right calf pain.  Occasional low back pain.      The MRI is summarized below:      FINDINGS:  Lumbar vertebral bodies are normal in height and alignment.  No acute fracture or subluxation.  No abnormal marrow edema.     Visualized distal thoracic spinal cord is normal in contour and signal intensity.  Conus medullaris terminates at L2.     L1-L2: No central spinal canal or foraminal stenosis.     L2-L3: No central spinal canal or foraminal stenosis.     L3-L4: Broad-based disc bulging with facet joint hypertrophy and ligamentum flavum thickening results in mild central spinal canal stenosis with mild bilateral neural foraminal narrowing.     L4-L5: Broad-based disc bulging with facet joint hypertrophy and ligamentum flavum thickening results in moderate central spinal canal stenosis with severe bilateral neural foraminal narrowing.     L5-S1: Broad-based disc bulging with facet joint hypertrophy and ligamentum flavum thickening results in moderate central spinal canal stenosis with mild narrowing of the right neural foramen and moderate narrowing the left neural foramen.     Impression:     1. Degenerative disc disease at L3-L4 resulting in mild central spinal canal stenosis with mild neural foraminal narrowing.  2. Degenerative disc disease at L4-L5 resulting in moderate central spinal canal stenosis with severe neural foraminal narrowing.  3. Degenerative disc disease L5-S1 resulting in moderate central spinal canal stenosis with mild to moderate neural foraminal narrowing.      Clinically she is about the same.  Symptoms are described above.  Has no new or progressive problems.  Note I also sent her for an ultrasound of the right leg which was negative for deep vein thrombosis.  Pain level is 5/10 and  interferes with quality of life in terms of activities of daily living recreation and social activities            Past Medical History:   Diagnosis Date    Atrial fibrillation 03/31/2022    CKD (chronic kidney disease)     COPD (chronic obstructive pulmonary disease)     Diabetes mellitus     Hypercholesteremia     Hypertension     Hypertensive retinopathy of both eyes 08/01/2024    Joey Landis MD    Personal history of colonic polyps 01/12/2016    Husam Brambila MD     Social History     Socioeconomic History    Marital status:     Number of children: 4    Highest education level: 4th grade   Occupational History    Occupation: [employer]   Tobacco Use    Smoking status: Never    Smokeless tobacco: Never   Substance and Sexual Activity    Alcohol use: Not Currently     Comment: social rare    Drug use: Never    Sexual activity: Not Currently     Partners: Male     Past Surgical History:   Procedure Laterality Date    COLONOSCOPY  01/12/2016    Husam Brambila MD    FRACTURE SURGERY      pelvis surgery    SHOULDER SURGERY Left     TUBAL LIGATION       Family History   Problem Relation Name Age of Onset    Heart disease Mother      Breast cancer Mother      Heart disease Father       There were no vitals filed for this visit.    Review of Systems   Constitutional:  Negative for chills, diaphoresis, fatigue, fever and unexpected weight change.   HENT:  Negative for trouble swallowing.    Eyes:  Negative for visual disturbance.   Respiratory:  Negative for shortness of breath.    Cardiovascular:  Negative for chest pain.   Gastrointestinal:  Negative for abdominal pain, constipation, nausea and vomiting.   Genitourinary:  Negative for difficulty urinating.   Musculoskeletal:  Negative for arthralgias, back pain, gait problem, joint swelling, myalgias, neck pain and neck stiffness.   Neurological:  Negative for dizziness, speech difficulty, weakness, light-headedness, numbness and headaches.          Objective:       Physical Exam  Neurological:      Mental Status: She is alert and oriented to person, place, and time.             Assessment:       1. Pain of right lower extremity           Plan:     I reassured her there are no worrisome findings on her MRI.  For symptom relief I am recommending interlaminar injections at L5-S1.  If she fails that then I would have no additional recommendations and she can seek a 2nd opinion with pain management at her discretion.  Follow up with me after the procedure      Pain of right lower extremity

## 2024-08-27 NOTE — PROGRESS NOTES
SUBJECTIVE:    Patient ID: Syl Matos is a 69 y.o. female.    Chief Complaint: No chief complaint on file.    She is here to review her lumbar MRI done 08/21/2024 to evaluate her complaint of chronic right calf pain.  Occasional low back pain.      The MRI is summarized below:      FINDINGS:  Lumbar vertebral bodies are normal in height and alignment.  No acute fracture or subluxation.  No abnormal marrow edema.     Visualized distal thoracic spinal cord is normal in contour and signal intensity.  Conus medullaris terminates at L2.     L1-L2: No central spinal canal or foraminal stenosis.     L2-L3: No central spinal canal or foraminal stenosis.     L3-L4: Broad-based disc bulging with facet joint hypertrophy and ligamentum flavum thickening results in mild central spinal canal stenosis with mild bilateral neural foraminal narrowing.     L4-L5: Broad-based disc bulging with facet joint hypertrophy and ligamentum flavum thickening results in moderate central spinal canal stenosis with severe bilateral neural foraminal narrowing.     L5-S1: Broad-based disc bulging with facet joint hypertrophy and ligamentum flavum thickening results in moderate central spinal canal stenosis with mild narrowing of the right neural foramen and moderate narrowing the left neural foramen.     Impression:     1. Degenerative disc disease at L3-L4 resulting in mild central spinal canal stenosis with mild neural foraminal narrowing.  2. Degenerative disc disease at L4-L5 resulting in moderate central spinal canal stenosis with severe neural foraminal narrowing.  3. Degenerative disc disease L5-S1 resulting in moderate central spinal canal stenosis with mild to moderate neural foraminal narrowing.      Clinically she is about the same.  Symptoms are described above.  Has no new or progressive problems.  Note I also sent her for an ultrasound of the right leg which was negative for deep vein thrombosis.  Pain level is 5/10 and  interferes with quality of life in terms of activities of daily living recreation and social activities            Past Medical History:   Diagnosis Date    Atrial fibrillation 03/31/2022    CKD (chronic kidney disease)     COPD (chronic obstructive pulmonary disease)     Diabetes mellitus     Hypercholesteremia     Hypertension     Hypertensive retinopathy of both eyes 08/01/2024    Joey Landis MD    Personal history of colonic polyps 01/12/2016    Husam Brambila MD     Social History     Socioeconomic History    Marital status:     Number of children: 4    Highest education level: 4th grade   Occupational History    Occupation: [employer]   Tobacco Use    Smoking status: Never    Smokeless tobacco: Never   Substance and Sexual Activity    Alcohol use: Not Currently     Comment: social rare    Drug use: Never    Sexual activity: Not Currently     Partners: Male     Past Surgical History:   Procedure Laterality Date    COLONOSCOPY  01/12/2016    Husam Brambila MD    FRACTURE SURGERY      pelvis surgery    SHOULDER SURGERY Left     TUBAL LIGATION       Family History   Problem Relation Name Age of Onset    Heart disease Mother      Breast cancer Mother      Heart disease Father       There were no vitals filed for this visit.    Review of Systems   Constitutional:  Negative for chills, diaphoresis, fatigue, fever and unexpected weight change.   HENT:  Negative for trouble swallowing.    Eyes:  Negative for visual disturbance.   Respiratory:  Negative for shortness of breath.    Cardiovascular:  Negative for chest pain.   Gastrointestinal:  Negative for abdominal pain, constipation, nausea and vomiting.   Genitourinary:  Negative for difficulty urinating.   Musculoskeletal:  Negative for arthralgias, back pain, gait problem, joint swelling, myalgias, neck pain and neck stiffness.   Neurological:  Negative for dizziness, speech difficulty, weakness, light-headedness, numbness and headaches.          Objective:       Physical Exam  Neurological:      Mental Status: She is alert and oriented to person, place, and time.             Assessment:       1. Pain of right lower extremity           Plan:     I reassured her there are no worrisome findings on her MRI.  For symptom relief I am recommending interlaminar injections at L5-S1.  If she fails that then I would have no additional recommendations and she can seek a 2nd opinion with pain management at her discretion.  Follow up with me after the procedure      Pain of right lower extremity

## 2024-08-28 ENCOUNTER — TELEPHONE (OUTPATIENT)
Dept: FAMILY MEDICINE | Facility: CLINIC | Age: 69
End: 2024-08-28
Payer: MEDICARE

## 2024-08-28 DIAGNOSIS — I15.2 HYPERTENSION ASSOCIATED WITH DIABETES: Primary | ICD-10-CM

## 2024-08-28 DIAGNOSIS — E11.59 HYPERTENSION ASSOCIATED WITH DIABETES: Primary | ICD-10-CM

## 2024-08-28 DIAGNOSIS — B37.31 VAGINAL YEAST INFECTION: ICD-10-CM

## 2024-08-28 NOTE — TELEPHONE ENCOUNTER
----- Message from Tamera Chew sent at 8/28/2024 10:16 AM CDT -----  Contact: Ely [phone]  Type: Needs Medical Advice  Who Called:  Pts daughter Ely     Best Call Back Number: [phone]    Additional Information: Ely requesting a call back to provide bp and sugar readings. Pls call back

## 2024-08-28 NOTE — TELEPHONE ENCOUNTER
Daughter reports    Blood pressure readings 7 days  Taking hypertension medication as prescribed.   8/21/24 /76   8/22/24 /79, P-74  8/23/24 /74 P-73  8/24/24 /85 P- 77  8/25/24 /79 P-84  8/26/24 /82 P- 78  8/27/24 /67 P- 81      Fasting glucose 7 days    8/21/24 185  8/22/24 115  8/23/24 164  8/24/24 128  8/25/24 172  8/26/24 160  8/27/24 192      NP notified of readings.

## 2024-08-30 RX ORDER — LISINOPRIL 30 MG/1
30 TABLET ORAL DAILY
Qty: 90 TABLET | Refills: 3 | Status: SHIPPED | OUTPATIENT
Start: 2024-08-30 | End: 2025-08-30

## 2024-08-30 RX ORDER — FLUCONAZOLE 150 MG/1
150 TABLET ORAL DAILY
Qty: 1 TABLET | Refills: 0 | Status: SHIPPED | OUTPATIENT
Start: 2024-08-30 | End: 2024-08-31

## 2024-08-30 NOTE — TELEPHONE ENCOUNTER
Lisinopril inc to 30 mg and ozempic inc to 1 mg. C/o vaginal itching and generalized body itching thus diflucan sent but edu r/t jardiance and if recurrent will d/c but recommend inc water intake and lotion for general body dryness.

## 2024-08-31 ENCOUNTER — HOSPITAL ENCOUNTER (EMERGENCY)
Facility: HOSPITAL | Age: 69
Discharge: HOME | End: 2024-09-01
Attending: EMERGENCY MEDICINE
Payer: MEDICARE

## 2024-08-31 VITALS
BODY MASS INDEX: 33.68 KG/M2 | HEIGHT: 62 IN | TEMPERATURE: 98 F | WEIGHT: 183 LBS | SYSTOLIC BLOOD PRESSURE: 113 MMHG | OXYGEN SATURATION: 100 % | DIASTOLIC BLOOD PRESSURE: 54 MMHG | HEART RATE: 67 BPM | RESPIRATION RATE: 19 BRPM

## 2024-08-31 DIAGNOSIS — E86.0 MILD DEHYDRATION: Primary | ICD-10-CM

## 2024-08-31 DIAGNOSIS — T50.905A MEDICATION SIDE EFFECT, INITIAL ENCOUNTER: ICD-10-CM

## 2024-08-31 DIAGNOSIS — R55 NEAR SYNCOPE: ICD-10-CM

## 2024-08-31 LAB
ALBUMIN SERPL BCP-MCNC: 3.9 G/DL (ref 3.5–5.2)
ALP SERPL-CCNC: 60 U/L (ref 55–135)
ALT SERPL W/O P-5'-P-CCNC: 16 U/L (ref 10–44)
ANION GAP SERPL CALC-SCNC: 15 MMOL/L (ref 8–16)
AST SERPL-CCNC: 16 U/L (ref 10–40)
BASOPHILS # BLD AUTO: 0.07 K/UL (ref 0–0.2)
BASOPHILS NFR BLD: 0.6 % (ref 0–1.9)
BILIRUB SERPL-MCNC: 0.8 MG/DL (ref 0.1–1)
BUN SERPL-MCNC: 27 MG/DL (ref 8–23)
CALCIUM SERPL-MCNC: 9.5 MG/DL (ref 8.7–10.5)
CHLORIDE SERPL-SCNC: 106 MMOL/L (ref 95–110)
CO2 SERPL-SCNC: 19 MMOL/L (ref 23–29)
CREAT SERPL-MCNC: 1.5 MG/DL (ref 0.5–1.4)
DIFFERENTIAL METHOD BLD: NORMAL
EOSINOPHIL # BLD AUTO: 0.2 K/UL (ref 0–0.5)
EOSINOPHIL NFR BLD: 1.4 % (ref 0–8)
ERYTHROCYTE [DISTWIDTH] IN BLOOD BY AUTOMATED COUNT: 12.8 % (ref 11.5–14.5)
EST. GFR  (NO RACE VARIABLE): 37.7 ML/MIN/1.73 M^2
GLUCOSE SERPL-MCNC: 166 MG/DL (ref 70–110)
HCT VFR BLD AUTO: 39.4 % (ref 37–48.5)
HGB BLD-MCNC: 13.3 G/DL (ref 12–16)
IMM GRANULOCYTES # BLD AUTO: 0.03 K/UL (ref 0–0.04)
IMM GRANULOCYTES NFR BLD AUTO: 0.3 % (ref 0–0.5)
LYMPHOCYTES # BLD AUTO: 3.1 K/UL (ref 1–4.8)
LYMPHOCYTES NFR BLD: 28 % (ref 18–48)
MCH RBC QN AUTO: 30.7 PG (ref 27–31)
MCHC RBC AUTO-ENTMCNC: 33.8 G/DL (ref 32–36)
MCV RBC AUTO: 91 FL (ref 82–98)
MONOCYTES # BLD AUTO: 0.8 K/UL (ref 0.3–1)
MONOCYTES NFR BLD: 7.3 % (ref 4–15)
NEUTROPHILS # BLD AUTO: 6.9 K/UL (ref 1.8–7.7)
NEUTROPHILS NFR BLD: 62.4 % (ref 38–73)
NRBC BLD-RTO: 0 /100 WBC
PLATELET # BLD AUTO: 276 K/UL (ref 150–450)
PMV BLD AUTO: 9.7 FL (ref 9.2–12.9)
POTASSIUM SERPL-SCNC: 4 MMOL/L (ref 3.5–5.1)
PROT SERPL-MCNC: 7.8 G/DL (ref 6–8.4)
RBC # BLD AUTO: 4.33 M/UL (ref 4–5.4)
SODIUM SERPL-SCNC: 140 MMOL/L (ref 136–145)
WBC # BLD AUTO: 11.04 K/UL (ref 3.9–12.7)

## 2024-08-31 PROCEDURE — 96360 HYDRATION IV INFUSION INIT: CPT

## 2024-08-31 PROCEDURE — 25000003 PHARM REV CODE 250: Performed by: EMERGENCY MEDICINE

## 2024-08-31 PROCEDURE — 99284 EMERGENCY DEPT VISIT MOD MDM: CPT | Mod: 25

## 2024-08-31 PROCEDURE — 93005 ELECTROCARDIOGRAM TRACING: CPT

## 2024-08-31 PROCEDURE — 85025 COMPLETE CBC W/AUTO DIFF WBC: CPT | Performed by: EMERGENCY MEDICINE

## 2024-08-31 PROCEDURE — 96361 HYDRATE IV INFUSION ADD-ON: CPT

## 2024-08-31 PROCEDURE — 93010 ELECTROCARDIOGRAM REPORT: CPT | Mod: ,,, | Performed by: INTERNAL MEDICINE

## 2024-08-31 PROCEDURE — 80053 COMPREHEN METABOLIC PANEL: CPT | Performed by: EMERGENCY MEDICINE

## 2024-08-31 RX ADMIN — SODIUM CHLORIDE 500 ML: 9 INJECTION, SOLUTION INTRAVENOUS at 10:08

## 2024-08-31 RX ADMIN — SODIUM CHLORIDE 500 ML: 9 INJECTION, SOLUTION INTRAVENOUS at 11:08

## 2024-09-01 NOTE — DISCHARGE INSTRUCTIONS
As we discussed, you are mildly dehydrated, but otherwise your labs were normal.  Because you have lost weight, it was probable that you do not need the same blood pressure medication dose as before the weight loss.  Contact your doctor after the weekend to discuss this.  In the meantime, when you stand up, make sure that you do not feel lightheaded before you start walking.  If you do feel lightheaded, sit down or lie down until the sensation passes.  Get up very slowly.  Return here for any worsening signs or symptoms.

## 2024-09-01 NOTE — ED PROVIDER NOTES
Encounter Date: 8/31/2024       History     Chief Complaint   Patient presents with    Near Syncope     Pt reports near syncopal episode today PTA. Pt reports pallor and diaphoresis. Reports taking BP at home at BP was 90 systolic.     69-year-old female here from home via private vehicle for evaluation and treatment of a near syncopal episode that occurred about an hour prior to coming here.  Patient states that she had just finished dinner, and when she tried to stand up from the table she felt very lightheaded.  She was eventually able to get up and go lay down in the bed where she took her blood pressure and found it to be about 90 systolic.  After awhile the symptoms resolved.  She did have 1 episode of loose stools soon thereafter.  Denies any dark or bloody stool.  She denies any symptoms of illness such as fever, sore throat, cough, etc..  Denies chest pains or palpitations.  She states her doctor recently changed her blood pressure medication dose.         Review of patient's allergies indicates:   Allergen Reactions    Iodine and iodide containing products Hives     Past Medical History:   Diagnosis Date    Atrial fibrillation 03/31/2022    CKD (chronic kidney disease)     COPD (chronic obstructive pulmonary disease)     Diabetes mellitus     Hypercholesteremia     Hypertension     Hypertensive retinopathy of both eyes 08/01/2024    Joey Landis MD    Personal history of colonic polyps 01/12/2016    Husam Brambila MD     Past Surgical History:   Procedure Laterality Date    COLONOSCOPY  01/12/2016    Husam Brambila MD    FRACTURE SURGERY      pelvis surgery    SHOULDER SURGERY Left     TUBAL LIGATION       Family History   Problem Relation Name Age of Onset    Heart disease Mother      Breast cancer Mother      Heart disease Father       Social History     Tobacco Use    Smoking status: Never    Smokeless tobacco: Never   Substance Use Topics    Alcohol use: Not Currently     Comment: social rare    Drug use:  Never     Review of Systems   Constitutional: Negative.    HENT: Negative.     Eyes: Negative.    Respiratory: Negative.     Cardiovascular:  Negative for palpitations.   Gastrointestinal:  Positive for diarrhea (An episode of loose stools, watery, nonbloody).   Endocrine: Negative.    Genitourinary: Negative.    Musculoskeletal: Negative.    Skin: Negative.    Allergic/Immunologic: Negative.    Neurological:  Positive for syncope (Near-syncope).   Psychiatric/Behavioral: Negative.         Physical Exam     Initial Vitals [08/31/24 2120]   BP Pulse Resp Temp SpO2   119/69 89 20 97.7 °F (36.5 °C) 100 %      MAP       --         Physical Exam    Nursing note and vitals reviewed.  Constitutional: She appears well-developed and well-nourished. No distress.   HENT:   Head: Normocephalic and atraumatic.   Nose: Nose normal.   Mouth/Throat: Oropharynx is clear and moist. No oropharyngeal exudate.   Eyes: Conjunctivae and EOM are normal. Pupils are equal, round, and reactive to light.   Neck: Neck supple. No JVD present.   Normal range of motion.  Cardiovascular:  Normal rate, regular rhythm, normal heart sounds and intact distal pulses.           No murmur heard.  Pulmonary/Chest: Breath sounds normal. No stridor. No respiratory distress. She has no wheezes. She has no rhonchi. She has no rales.   Abdominal: Abdomen is soft. Bowel sounds are normal. She exhibits no distension. There is no abdominal tenderness.   Musculoskeletal:         General: No tenderness or edema. Normal range of motion.      Cervical back: Normal range of motion and neck supple.     Neurological: She is alert and oriented to person, place, and time. She has normal strength. No cranial nerve deficit or sensory deficit. GCS score is 15. GCS eye subscore is 4. GCS verbal subscore is 5. GCS motor subscore is 6.   Skin: Skin is warm and dry. Capillary refill takes less than 2 seconds. No rash noted. No erythema.   Psychiatric: She has a normal mood and  affect. Her behavior is normal.         ED Course   Procedures  Labs Reviewed   COMPREHENSIVE METABOLIC PANEL - Abnormal       Result Value    Sodium 140      Potassium 4.0      Chloride 106      CO2 19 (*)     Glucose 166 (*)     BUN 27 (*)     Creatinine 1.5 (*)     Calcium 9.5      Total Protein 7.8      Albumin 3.9      Total Bilirubin 0.8      Alkaline Phosphatase 60      AST 16      ALT 16      eGFR 37.7 (*)     Anion Gap 15     CBC W/ AUTO DIFFERENTIAL    WBC 11.04      RBC 4.33      Hemoglobin 13.3      Hematocrit 39.4      MCV 91      MCH 30.7      MCHC 33.8      RDW 12.8      Platelets 276      MPV 9.7      Immature Granulocytes 0.3      Gran # (ANC) 6.9      Immature Grans (Abs) 0.03      Lymph # 3.1      Mono # 0.8      Eos # 0.2      Baso # 0.07      nRBC 0      Gran % 62.4      Lymph % 28.0      Mono % 7.3      Eosinophil % 1.4      Basophil % 0.6      Differential Method Automated       EKG Readings: (Independently Interpreted)   EKG, personally reviewed by me shows normal sinus rhythm, left axis, inferior infarct of undetermined age, 79 beats per minute, LA interval 156, .  No obvious ST elevation or depression, no arrhythmia       Imaging Results    None          Medications   sodium chloride 0.9% bolus 500 mL 500 mL (0 mLs Intravenous Stopped 8/31/24 0716)   sodium chloride 0.9% bolus 500 mL 500 mL (0 mLs Intravenous Stopped 9/1/24 0034)     Medical Decision Making  Differential includes hypotension, hypoglycemia, myocardial infarction, sepsis, UTI, etc.    Patient has felt well during her stay here.  She denies any current symptoms.  Denies any recent illnesses.  She does take Ozempic, and has been losing weight recently.  I believe it is likely that because of the weight loss, she no longer needs her current dose of lisinopril, which she states is 20 mg.  Here, labs are unremarkable except for mildly elevated BUN and creatinine.  She was initially given 500 mL normal saline, and will be  given an additional 500 mL prior to discharge.  I have recommended that she continues her current dose of lisinopril, but stay well hydrated, and make very certain upon standing that she was not feeling lightheaded before she begins walking.  She will follow-up with her PCP after the weekend, and return here as needed or if worse in any way.    Amount and/or Complexity of Data Reviewed  Labs: ordered.                                      Clinical Impression:  Final diagnoses:  [R55] Near syncope  [E86.0] Mild dehydration (Primary)  [T50.905A] Medication side effect, initial encounter          ED Disposition Condition    Discharge Stable          ED Prescriptions    None       Follow-up Information       Follow up With Specialties Details Why Contact Info    Africa Altamirano NP Family Medicine Call in 3 days  [address] [facility]  [address]LO[address]  [facility] [address] [phone]      [facility] [place] Emergency Dept Emergency Medicine  As needed, If symptoms worsen [address] [facility] [address] [phone]             Tod Pascual MD  09/01/24 2229

## 2024-09-03 ENCOUNTER — TELEPHONE (OUTPATIENT)
Dept: FAMILY MEDICINE | Facility: CLINIC | Age: 69
End: 2024-09-03
Payer: MEDICARE

## 2024-09-03 LAB
OHS QRS DURATION: 92 MS
OHS QTC CALCULATION: 451 MS

## 2024-09-03 NOTE — TELEPHONE ENCOUNTER
Spoke w/ pt daughter   /78 w/ taking lisinopril. Last dose the AM  Pt requesting to switch back to Metoprolol. Please advise

## 2024-09-03 NOTE — TELEPHONE ENCOUNTER
----- Message from Concepcion Rose sent at 9/3/2024  2:20 PM CDT -----  Regarding: call back  Contact: pt daughter  Type: Needs Medical Advice  Who Called:  pt daughter   Symptoms (please be specific):  12:36 157/77 pulse 76  How long has patient had these symptoms:    Pharmacy name and phone #:    Best Call Back Number:[phone]    Additional Information: seeking to speak with you regarding pt blood pressure call back thanks    [phone] ///

## 2024-09-04 NOTE — TELEPHONE ENCOUNTER
HTN discussed with  this am and 174 this afternoon thus enc to resume 30 mg lisinopril but stay hydrated as BP was perfect in ER. Discussed ER dx dehydration and doubt 5 days of lisinopril 30 mg caused her to be dizzy.

## 2024-09-05 ENCOUNTER — HOSPITAL ENCOUNTER (OUTPATIENT)
Facility: HOSPITAL | Age: 69
Discharge: HOME | End: 2024-09-05
Attending: ANESTHESIOLOGY | Admitting: ANESTHESIOLOGY
Payer: MEDICARE

## 2024-09-05 DIAGNOSIS — M54.16 LUMBAR RADICULITIS: ICD-10-CM

## 2024-09-05 LAB — POCT GLUCOSE: 158 MG/DL (ref 70–110)

## 2024-09-05 PROCEDURE — A4216 STERILE WATER/SALINE, 10 ML: HCPCS | Performed by: ANESTHESIOLOGY

## 2024-09-05 PROCEDURE — 25500020 PHARM REV CODE 255: Performed by: ANESTHESIOLOGY

## 2024-09-05 PROCEDURE — 25000003 PHARM REV CODE 250: Performed by: ANESTHESIOLOGY

## 2024-09-05 PROCEDURE — 62323 NJX INTERLAMINAR LMBR/SAC: CPT | Performed by: ANESTHESIOLOGY

## 2024-09-05 PROCEDURE — 62323 NJX INTERLAMINAR LMBR/SAC: CPT | Mod: ,,, | Performed by: ANESTHESIOLOGY

## 2024-09-05 PROCEDURE — 63600175 PHARM REV CODE 636 W HCPCS: Performed by: ANESTHESIOLOGY

## 2024-09-05 PROCEDURE — A9579 GAD-BASE MR CONTRAST NOS,1ML: HCPCS | Performed by: ANESTHESIOLOGY

## 2024-09-05 RX ORDER — SODIUM CHLORIDE, SODIUM LACTATE, POTASSIUM CHLORIDE, CALCIUM CHLORIDE 600; 310; 30; 20 MG/100ML; MG/100ML; MG/100ML; MG/100ML
INJECTION, SOLUTION INTRAVENOUS CONTINUOUS
Status: DISCONTINUED | OUTPATIENT
Start: 2024-09-05 | End: 2024-09-05 | Stop reason: HOSPADM

## 2024-09-05 RX ORDER — SODIUM CHLORIDE 9 MG/ML
INJECTION, SOLUTION INTRAMUSCULAR; INTRAVENOUS; SUBCUTANEOUS
Status: DISCONTINUED | OUTPATIENT
Start: 2024-09-05 | End: 2024-09-05 | Stop reason: HOSPADM

## 2024-09-05 RX ORDER — LIDOCAINE HYDROCHLORIDE 10 MG/ML
1 INJECTION, SOLUTION EPIDURAL; INFILTRATION; INTRACAUDAL; PERINEURAL ONCE
Status: COMPLETED | OUTPATIENT
Start: 2024-09-05 | End: 2024-09-05

## 2024-09-05 RX ORDER — DEXAMETHASONE SODIUM PHOSPHATE 10 MG/ML
INJECTION INTRAMUSCULAR; INTRAVENOUS
Status: DISCONTINUED | OUTPATIENT
Start: 2024-09-05 | End: 2024-09-05 | Stop reason: HOSPADM

## 2024-09-05 RX ORDER — MIDAZOLAM HYDROCHLORIDE 1 MG/ML
INJECTION INTRAMUSCULAR; INTRAVENOUS
Status: DISCONTINUED | OUTPATIENT
Start: 2024-09-05 | End: 2024-09-05 | Stop reason: HOSPADM

## 2024-09-05 RX ORDER — LIDOCAINE HYDROCHLORIDE 10 MG/ML
INJECTION, SOLUTION EPIDURAL; INFILTRATION; INTRACAUDAL; PERINEURAL
Status: DISCONTINUED | OUTPATIENT
Start: 2024-09-05 | End: 2024-09-05 | Stop reason: HOSPADM

## 2024-09-05 RX ORDER — FENTANYL CITRATE 50 UG/ML
INJECTION, SOLUTION INTRAMUSCULAR; INTRAVENOUS
Status: DISCONTINUED | OUTPATIENT
Start: 2024-09-05 | End: 2024-09-05 | Stop reason: HOSPADM

## 2024-09-05 RX ADMIN — LIDOCAINE HYDROCHLORIDE 0.2 ML: 10 SOLUTION INTRAVENOUS at 09:09

## 2024-09-05 RX ADMIN — SODIUM CHLORIDE, POTASSIUM CHLORIDE, SODIUM LACTATE AND CALCIUM CHLORIDE: 600; 310; 30; 20 INJECTION, SOLUTION INTRAVENOUS at 09:09

## 2024-09-05 NOTE — OP NOTE
PROCEDURE DATE: 9/5/2024    Procedure:   Interlaminar epidural steroid injection at L5-S1 under fluoroscopic guidance.    Diagnosis: lUMBAR radiculitis  pOSTOP DIAGNOSIS: sAME    Physician: Royal Garcia M.D.    Medications injected:10 mg dexamethasone with 4 ml of preservative free NaCl    Local anesthetic injected:    Lidocaine 1% 1 ml total    Sedation Medications: RN IV sedation    Estimated blood loss:  None    Complications:  None    Technique:  Time-out taken to identify patient and procedure prior to starting the procedure.  With the patient laying in a prone position, the area was prepped and draped in the usual sterile fashion using ChloraPrep and a fenestrated drape.  After determining the target level with an AP fluoroscopic view, local anesthetic was given using a 25-gauge 1.5 inch needle by raising a wheal and then infiltrating toward the interlaminar entry space.  A 3.5inch 20-gauge Touhy needle was introduced under AP fluoroscopic guidance to the interlaminar space of L5-S1. Once the trajectory was established, the needle was visualized in the lateral view and advanced using loss of resistance technique. Once in the desired position, 1ml contrast was injected to confirm placement and there was no vascular uptake nor intrathecal spread.  The medication was then injected slowly. The patient tolerated the procedure well.      The patient was monitored after the procedure.   They were given post-procedure and discharge instructions to follow at home.  The patient was discharged in a stable condition.

## 2024-09-05 NOTE — PLAN OF CARE
Tolerated procedure well. Denies pain at this time. Transferred out of facility via wheelchair to family without incident. Discharge instructions in hand upon departure.

## 2024-09-05 NOTE — DISCHARGE SUMMARY
[facility] - Periop Services  Discharge Note  Short Stay    Procedure(s) (LRB):  Injection-steroid-epidural-lumbar l5-s1 (N/A)      OUTCOME: Patient tolerated treatment/procedure well without complication and is now ready for discharge.    DISPOSITION: Home or Self Care    FINAL DIAGNOSIS:  <principal problem not specified>    FOLLOWUP: In clinic    DISCHARGE INSTRUCTIONS:    Discharge Procedure Orders   Notify your health care provider if you experience any of the following:  temperature >100.4     Notify your health care provider if you experience any of the following:  severe uncontrolled pain     Notify your health care provider if you experience any of the following:  redness, tenderness, or signs of infection (pain, swelling, redness, odor or green/yellow discharge around incision site)     Activity as tolerated        TIME SPENT ON DISCHARGE: 30 minutes

## 2024-09-06 VITALS
SYSTOLIC BLOOD PRESSURE: 149 MMHG | HEART RATE: 73 BPM | WEIGHT: 186.06 LBS | HEIGHT: 62 IN | TEMPERATURE: 97 F | BODY MASS INDEX: 34.24 KG/M2 | DIASTOLIC BLOOD PRESSURE: 71 MMHG | OXYGEN SATURATION: 98 % | RESPIRATION RATE: 16 BRPM

## 2024-09-16 ENCOUNTER — TELEPHONE (OUTPATIENT)
Dept: FAMILY MEDICINE | Facility: CLINIC | Age: 69
End: 2024-09-16
Payer: MEDICARE

## 2024-09-16 DIAGNOSIS — M54.50 CHRONIC MIDLINE LOW BACK PAIN WITHOUT SCIATICA: Primary | ICD-10-CM

## 2024-09-16 DIAGNOSIS — G89.29 CHRONIC MIDLINE LOW BACK PAIN WITHOUT SCIATICA: Primary | ICD-10-CM

## 2024-09-16 RX ORDER — IBUPROFEN 600 MG/1
600 TABLET ORAL EVERY 8 HOURS PRN
Qty: 60 TABLET | Refills: 2 | Status: SHIPPED | OUTPATIENT
Start: 2024-09-16

## 2024-09-18 ENCOUNTER — OFFICE VISIT (OUTPATIENT)
Dept: PULMONOLOGY | Facility: CLINIC | Age: 69
End: 2024-09-18
Payer: MEDICARE

## 2024-09-18 VITALS
WEIGHT: 182.88 LBS | BODY MASS INDEX: 33.65 KG/M2 | DIASTOLIC BLOOD PRESSURE: 72 MMHG | OXYGEN SATURATION: 99 % | HEIGHT: 62 IN | SYSTOLIC BLOOD PRESSURE: 142 MMHG | HEART RATE: 73 BPM

## 2024-09-18 DIAGNOSIS — J44.9 CHRONIC OBSTRUCTIVE PULMONARY DISEASE, UNSPECIFIED COPD TYPE: ICD-10-CM

## 2024-09-18 DIAGNOSIS — J96.11 CHRONIC RESPIRATORY FAILURE WITH HYPOXIA: ICD-10-CM

## 2024-09-18 DIAGNOSIS — R06.09 DYSPNEA ON EXERTION: Primary | ICD-10-CM

## 2024-09-18 DIAGNOSIS — J43.1 PANLOBULAR EMPHYSEMA: ICD-10-CM

## 2024-09-18 PROCEDURE — 3061F NEG MICROALBUMINURIA REV: CPT | Mod: CPTII,S$GLB,, | Performed by: NURSE PRACTITIONER

## 2024-09-18 PROCEDURE — 1159F MED LIST DOCD IN RCRD: CPT | Mod: CPTII,S$GLB,, | Performed by: NURSE PRACTITIONER

## 2024-09-18 PROCEDURE — 1126F AMNT PAIN NOTED NONE PRSNT: CPT | Mod: CPTII,S$GLB,, | Performed by: NURSE PRACTITIONER

## 2024-09-18 PROCEDURE — 4010F ACE/ARB THERAPY RXD/TAKEN: CPT | Mod: CPTII,S$GLB,, | Performed by: NURSE PRACTITIONER

## 2024-09-18 PROCEDURE — 3051F HG A1C>EQUAL 7.0%<8.0%: CPT | Mod: CPTII,S$GLB,, | Performed by: NURSE PRACTITIONER

## 2024-09-18 PROCEDURE — 3288F FALL RISK ASSESSMENT DOCD: CPT | Mod: CPTII,S$GLB,, | Performed by: NURSE PRACTITIONER

## 2024-09-18 PROCEDURE — 99999 PR PBB SHADOW E&M-EST. PATIENT-LVL V: CPT | Mod: PBBFAC,,, | Performed by: NURSE PRACTITIONER

## 2024-09-18 PROCEDURE — 3077F SYST BP >= 140 MM HG: CPT | Mod: CPTII,S$GLB,, | Performed by: NURSE PRACTITIONER

## 2024-09-18 PROCEDURE — 3008F BODY MASS INDEX DOCD: CPT | Mod: CPTII,S$GLB,, | Performed by: NURSE PRACTITIONER

## 2024-09-18 PROCEDURE — 99204 OFFICE O/P NEW MOD 45 MIN: CPT | Mod: S$GLB,,, | Performed by: NURSE PRACTITIONER

## 2024-09-18 PROCEDURE — 3078F DIAST BP <80 MM HG: CPT | Mod: CPTII,S$GLB,, | Performed by: NURSE PRACTITIONER

## 2024-09-18 PROCEDURE — 3066F NEPHROPATHY DOC TX: CPT | Mod: CPTII,S$GLB,, | Performed by: NURSE PRACTITIONER

## 2024-09-18 PROCEDURE — 1101F PT FALLS ASSESS-DOCD LE1/YR: CPT | Mod: CPTII,S$GLB,, | Performed by: NURSE PRACTITIONER

## 2024-09-18 RX ORDER — BUDESONIDE, GLYCOPYRROLATE, AND FORMOTEROL FUMARATE 160; 9; 4.8 UG/1; UG/1; UG/1
2 AEROSOL, METERED RESPIRATORY (INHALATION) 2 TIMES DAILY
Qty: 32.1 G | Refills: 3 | Status: SHIPPED | OUTPATIENT
Start: 2024-09-18

## 2024-09-18 NOTE — PROGRESS NOTES
"Subjective:      Patient ID: Syl Matos is a 69 y.o. female.    Chief Complaint: COPD    COPD  Associated symptoms include fatigue.   Patient presents to [facility] care for management of COPD. Diagnosed many years ago, unknown exactly how long. Sleeps wearing supplemental oxygen at 2L, denies needing supplemental oxygen throughout the day only with sleep. Symptoms of COPD exacerbation includes SOB and cough. Patient reports she gets SOB and fatigued with exertion. Reports moderate improvement in symptoms with inhaled therapy. Uses nebulizer twice weekly as needed. Uses daily inhaler as prescribed, and uses rescue inhaler as needed. Reports she used her rescue inhaler last week for symptoms of SOB and fatigue. Patient reports symptoms of fatigue throughout the day. States she has been evaluated for sleep apnea in the past and reports she was told she needs to "wear a mask" for sleep apnea.     Review of Systems   Constitutional:  Positive for weight loss, appetite change, fatigue and night sweats.        Taking ozempic   Respiratory:  Positive for snoring, shortness of breath and somnolence.    Psychiatric/Behavioral:  Positive for sleep disturbance.         Insomnia   Objective:     Physical Exam   Constitutional: She appears well-developed.   HENT:   Head: Normocephalic.   Cardiovascular: Normal rate, regular rhythm and normal heart sounds.   Pulmonary/Chest: Normal expansion and effort normal. She has decreased breath sounds.   Musculoskeletal:         General: No edema.      Cervical back: Normal range of motion.   Skin: Nails show no clubbing.   Personal Diagnostic Review        9/18/2024    10:58 AM 9/5/2024    10:05 AM 9/5/2024    10:00 AM 9/5/2024     9:55 AM 9/5/2024     9:50 AM 9/5/2024     9:49 AM 9/5/2024     9:39 AM   Pulmonary Function Tests   SpO2 99 % 98 % 97 % 97 % 95 % 97 % 100 %   Height 5' 2" (1.575 m)         Weight 83 kg (182 lb 14 oz)         BMI (Calculated) 33.4            Latest " Reference Range & Units 08/31/24 22:22   CO2 23 - 29 mmol/L 19 (L)   (L): Data is abnormally low      X-Ray Chest PA And Lateral  Impression:    Cardiomegaly with central pulmonary vascular congestion.        Electronically signed by:Hyun Hurley  Date:                                            11/20/2022      Transthoracic echo (TTE) complete 3/30/2022    The left ventricle is normal in size with concentric hypertrophy and normal systolic function.  The estimated ejection fraction is 60%.  Moderate right ventricular enlargement.  No vegetation seen    Assessment:     No diagnosis found.     Outpatient Encounter Medications as of 9/18/2024   Medication Sig Dispense Refill    albuterol (PROVENTIL/VENTOLIN HFA) 90 mcg/actuation inhaler Inhale 1-2 puffs into the lungs every 6 (six) hours as needed for Wheezing or Shortness of Breath. Rescue 18 g 11    albuterol-ipratropium (DUO-NEB) 2.5 mg-0.5 mg/3 mL nebulizer solution Take 3 mLs by nebulization every 4 (four) hours as needed for Wheezing. Rescue 75 mL 11    amlodipine-olmesartan (TODD) 5-40 mg per tablet Take 1 tablet by mouth once daily. Replaces lisinopril. 90 tablet 1    aspirin (ECOTRIN) 81 MG EC tablet Take 1 tablet (81 mg total) by mouth every evening. heart 90 tablet 3    atorvastatin (LIPITOR) 40 MG tablet Take 1 tablet (40 mg total) by mouth every evening. cholesterol 90 tablet 3    busPIRone (BUSPAR) 15 MG tablet Take 1 tablet (15 mg total) by mouth 2 (two) times daily. Buspar 180 tablet 3    cholecalciferol, vitamin D3, 1,250 mcg (50,000 unit) Tab Take 1 tablet by mouth once a week. 12 tablet 3    cyanocobalamin 1,000 mcg/mL injection Inject 1 mL (1,000 mcg total) into the skin every 30 days. 3 mL 3    diclofenac sodium (VOLTAREN) 1 % Gel Apply 2 g topically 3 (three) times daily. 100 g 2    empagliflozin (JARDIANCE) 10 mg tablet Take 1 tablet (10 mg total) by mouth every morning. Diabetes 90 tablet 3    EScitalopram oxalate (LEXAPRO) 20 MG tablet Take 1  tablet (20 mg total) by mouth every morning. Depression/anxiety 90 tablet 3    [] fluconazole (DIFLUCAN) 150 MG Tab Take 1 tablet (150 mg total) by mouth once daily. for 1 day 1 tablet 0    ibuprofen (ADVIL,MOTRIN) 600 MG tablet Take 1 tablet (600 mg total) by mouth every 8 (eight) hours as needed for Pain. 60 tablet 2    LANTUS U-100 INSULIN 100 unit/mL injection Inject 50 Units into the skin every evening.      metFORMIN (GLUCOPHAGE) 1000 MG tablet Take 1 tablet (1,000 mg total) by mouth 2 (two) times daily. Diabetes 180 tablet 3    nitroGLYCERIN (NITROSTAT) 0.4 MG SL tablet Place 1 tablet (0.4 mg total) under the tongue every 5 (five) minutes as needed for Chest pain. 30 tablet 3    oxybutynin (DITROPAN-XL) 10 MG 24 hr tablet Take 1 tablet (10 mg total) by mouth once daily. 90 tablet 3    semaglutide (OZEMPIC) 1 mg/dose (4 mg/3 mL) Inject 1 mg into the skin every 7 days. 3 mL 11    tiZANidine (ZANAFLEX) 4 MG tablet Take 1 tablet (4 mg total) by mouth every 6 (six) hours as needed (calf pain). 30 tablet 5    WIXELA INHUB 250-50 mcg/dose diskus inhaler Inhale 1 puff into the lungs 2 (two) times a day. 180 each 3    [DISCONTINUED] lisinopriL (PRINIVIL,ZESTRIL) 20 MG tablet Take 1 tablet (20 mg total) by mouth once daily. 90 tablet 3    [DISCONTINUED] lisinopriL (PRINIVIL,ZESTRIL) 30 MG tablet Take 1 tablet (30 mg total) by mouth once daily. 90 tablet 3    [DISCONTINUED] metoprolol succinate (TOPROL-XL) 50 MG 24 hr tablet Take 50 mg by mouth once daily.      [DISCONTINUED] OZEMPIC 0.25 mg or 0.5 mg (2 mg/3 mL) pen injector Inject 0.5 mg into the skin every 7 days.      [DISCONTINUED] propranolol (INDERAL LA) 160 mg 24 hr capsule   3    [] sodium chloride 0.9% bolus 500 mL 500 mL        No facility-administered encounter medications on file as of 2024.     No orders of the defined types were placed in this encounter.

## 2024-09-18 NOTE — PATIENT INSTRUCTIONS
Starting new medication  Breztri 2 puffs twice a day every day, rinse mouth after using due to risk for thrush if mouth or tongue has white sores contact clinic    Use nebulizer as needed for shortness of breath    Ordering special blood test to evaluate for Genetic reasons to have COPD    CT of chest to evaluate shortness of breath further
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
## 2024-09-20 ENCOUNTER — TELEPHONE (OUTPATIENT)
Dept: FAMILY MEDICINE | Facility: CLINIC | Age: 69
End: 2024-09-20
Payer: MEDICARE

## 2024-10-03 ENCOUNTER — LAB VISIT (OUTPATIENT)
Dept: LAB | Facility: HOSPITAL | Age: 69
End: 2024-10-03
Attending: NURSE PRACTITIONER
Payer: MEDICARE

## 2024-10-03 DIAGNOSIS — E11.59 HYPERTENSION ASSOCIATED WITH DIABETES: ICD-10-CM

## 2024-10-03 DIAGNOSIS — I15.2 HYPERTENSION ASSOCIATED WITH DIABETES: ICD-10-CM

## 2024-10-03 LAB
ALBUMIN SERPL BCP-MCNC: 3.8 G/DL (ref 3.5–5.2)
ALP SERPL-CCNC: 58 U/L (ref 55–135)
ALT SERPL W/O P-5'-P-CCNC: 17 U/L (ref 10–44)
ANION GAP SERPL CALC-SCNC: 12 MMOL/L (ref 8–16)
AST SERPL-CCNC: 14 U/L (ref 10–40)
BASOPHILS # BLD AUTO: 0.08 K/UL (ref 0–0.2)
BASOPHILS NFR BLD: 0.7 % (ref 0–1.9)
BILIRUB SERPL-MCNC: 0.7 MG/DL (ref 0.1–1)
BUN SERPL-MCNC: 24 MG/DL (ref 8–23)
CALCIUM SERPL-MCNC: 9.5 MG/DL (ref 8.7–10.5)
CHLORIDE SERPL-SCNC: 105 MMOL/L (ref 95–110)
CO2 SERPL-SCNC: 23 MMOL/L (ref 23–29)
CREAT SERPL-MCNC: 1.3 MG/DL (ref 0.5–1.4)
DIFFERENTIAL METHOD BLD: NORMAL
EOSINOPHIL # BLD AUTO: 0.3 K/UL (ref 0–0.5)
EOSINOPHIL NFR BLD: 3 % (ref 0–8)
ERYTHROCYTE [DISTWIDTH] IN BLOOD BY AUTOMATED COUNT: 12.9 % (ref 11.5–14.5)
EST. GFR  (NO RACE VARIABLE): 44.8 ML/MIN/1.73 M^2
ESTIMATED AVG GLUCOSE: 180 MG/DL (ref 68–131)
GLUCOSE SERPL-MCNC: 152 MG/DL (ref 70–110)
HBA1C MFR BLD: 7.9 % (ref 4–5.6)
HCT VFR BLD AUTO: 38.2 % (ref 37–48.5)
HGB BLD-MCNC: 12.4 G/DL (ref 12–16)
IMM GRANULOCYTES # BLD AUTO: 0.04 K/UL (ref 0–0.04)
IMM GRANULOCYTES NFR BLD AUTO: 0.4 % (ref 0–0.5)
LYMPHOCYTES # BLD AUTO: 3.9 K/UL (ref 1–4.8)
LYMPHOCYTES NFR BLD: 35.8 % (ref 18–48)
MCH RBC QN AUTO: 30.7 PG (ref 27–31)
MCHC RBC AUTO-ENTMCNC: 32.5 G/DL (ref 32–36)
MCV RBC AUTO: 95 FL (ref 82–98)
MONOCYTES # BLD AUTO: 1 K/UL (ref 0.3–1)
MONOCYTES NFR BLD: 8.8 % (ref 4–15)
NEUTROPHILS # BLD AUTO: 5.5 K/UL (ref 1.8–7.7)
NEUTROPHILS NFR BLD: 51.3 % (ref 38–73)
NRBC BLD-RTO: 0 /100 WBC
PLATELET # BLD AUTO: 313 K/UL (ref 150–450)
PMV BLD AUTO: 9.5 FL (ref 9.2–12.9)
POTASSIUM SERPL-SCNC: 4.6 MMOL/L (ref 3.5–5.1)
PROT SERPL-MCNC: 7.5 G/DL (ref 6–8.4)
RBC # BLD AUTO: 4.04 M/UL (ref 4–5.4)
SODIUM SERPL-SCNC: 140 MMOL/L (ref 136–145)
WBC # BLD AUTO: 10.74 K/UL (ref 3.9–12.7)

## 2024-10-03 PROCEDURE — 36415 COLL VENOUS BLD VENIPUNCTURE: CPT | Performed by: NURSE PRACTITIONER

## 2024-10-03 PROCEDURE — 85025 COMPLETE CBC W/AUTO DIFF WBC: CPT | Performed by: NURSE PRACTITIONER

## 2024-10-03 PROCEDURE — 83036 HEMOGLOBIN GLYCOSYLATED A1C: CPT | Performed by: NURSE PRACTITIONER

## 2024-10-03 PROCEDURE — 80053 COMPREHEN METABOLIC PANEL: CPT | Performed by: NURSE PRACTITIONER

## 2024-10-04 ENCOUNTER — OFFICE VISIT (OUTPATIENT)
Dept: FAMILY MEDICINE | Facility: CLINIC | Age: 69
End: 2024-10-04
Payer: MEDICARE

## 2024-10-04 ENCOUNTER — TELEPHONE (OUTPATIENT)
Dept: GASTROENTEROLOGY | Facility: CLINIC | Age: 69
End: 2024-10-04
Payer: MEDICARE

## 2024-10-04 VITALS
WEIGHT: 181 LBS | BODY MASS INDEX: 33.11 KG/M2 | OXYGEN SATURATION: 97 % | DIASTOLIC BLOOD PRESSURE: 68 MMHG | HEART RATE: 81 BPM | SYSTOLIC BLOOD PRESSURE: 120 MMHG

## 2024-10-04 DIAGNOSIS — R21 RASH: ICD-10-CM

## 2024-10-04 DIAGNOSIS — E11.59 HYPERTENSION ASSOCIATED WITH DIABETES: Primary | ICD-10-CM

## 2024-10-04 DIAGNOSIS — E53.8 B12 DEFICIENCY: ICD-10-CM

## 2024-10-04 DIAGNOSIS — I15.2 HYPERTENSION ASSOCIATED WITH DIABETES: Primary | ICD-10-CM

## 2024-10-04 PROCEDURE — 99999 PR PBB SHADOW E&M-EST. PATIENT-LVL V: CPT | Mod: PBBFAC,,, | Performed by: NURSE PRACTITIONER

## 2024-10-04 RX ORDER — CYANOCOBALAMIN 1000 UG/ML
1000 INJECTION, SOLUTION INTRAMUSCULAR; SUBCUTANEOUS
Qty: 3 ML | Refills: 3 | Status: SHIPPED | OUTPATIENT
Start: 2024-10-04

## 2024-10-04 RX ORDER — BETAMETHASONE DIPROPIONATE 0.5 MG/G
CREAM TOPICAL 2 TIMES DAILY PRN
Qty: 45 G | Refills: 5 | Status: SHIPPED | OUTPATIENT
Start: 2024-10-04

## 2024-10-04 RX ORDER — LANCETS
EACH MISCELLANEOUS
Qty: 200 EACH | Refills: 11 | Status: SHIPPED | OUTPATIENT
Start: 2024-10-04

## 2024-10-04 NOTE — TELEPHONE ENCOUNTER
----- Message from Josefina sent at 10/4/2024 10:52 AM CDT -----  Regarding: Medical Assistance  Contact: Patient Daughter Ely  Type:  Needs Medical Advice    Who Called: Ely (Daughter)  Symptoms (please be specific): n/a    How long has patient had these symptoms:  n/a   Pharmacy name and phone #:  n/a   Would the patient rather a call back or a response via [facility]? Call back   Best Call Back Number:  [phone] or [phone]   Additional Information: Ely is requesting a call back in reference to medication prescribed for colonoscopy Ely would like to discuss possibly changing medication please assist

## 2024-10-04 NOTE — PATIENT INSTRUCTIONS
1-Call GI doctor asking if you can take a pill for a prep verses drinking all the stuff.  [phone].  2- Nikki to send Viv Phillips NP a message about your diabetes.   3- call digital diabetes [phone] letting them know she has a [place] continuous glucose meter.

## 2024-10-04 NOTE — PROGRESS NOTES
Subjective:       Patient ID: Syl Matos is a 69 y.o. female.    Chief Complaint: Follow-up  -  The pt is a 68 y/o female that presents for follow up. Is under both digital HTN and DM program with [facility] recently had BP med changed to Lisa and is doing better.    Discussed A1c yesterday 7.9 and has never been less than 7.8 in 4 yrs with [facility]. Does admit to eating a lot of sweets at home thus end to stop. [place] [facility] [place] NP dec her ozempic to 0.5 but note says 1 mg, pt not sure what dose she is taking.     Is scheduled for upcoming colonoscopy for screening. Seen by pulmonary with inhaler changed to Breztri. Is still wearing O2 qhs. Does have SOB with activity. Chest CT and PFT scheduled.    Remains under the care of spine MD with recent lumbar inj, reports has helped.  -     Past Medical History:   Diagnosis Date    Anxiety and depression     Atrial fibrillation 03/31/2022    CKD (chronic kidney disease)     COPD (chronic obstructive pulmonary disease)     Diabetes mellitus     Graves disease     Hypercholesteremia     Hypertension     Hypertensive retinopathy of both eyes 08/01/2024    Joey Landis MD    Personal history of colonic polyps 01/12/2016    Husam Brambila MD    Sleep apnea        Past Surgical History:   Procedure Laterality Date    COLONOSCOPY  01/12/2016    Husam Brambila MD    EPIDURAL STEROID INJECTION INTO LUMBAR SPINE N/A 9/5/2024    Procedure: Injection-steroid-epidural-lumbar;  Surgeon: Royal Garcia MD;  Location: [facility] OR;  Service: Pain Management;  Laterality: N/A;    FRACTURE SURGERY      pelvis surgery    SHOULDER SURGERY Left     TUBAL LIGATION          Social History     Socioeconomic History    Marital status:     Number of children: 4    Highest education level: 4th grade   Occupational History    Occupation: [employer]   Tobacco Use    Smoking status: Never    Smokeless tobacco: Never   Substance and Sexual Activity    Alcohol  use: Not Currently     Comment: social rare    Drug use: Never    Sexual activity: Not Currently     Partners: Male       Family History   Problem Relation Name Age of Onset    Heart disease Mother      Breast cancer Mother      Heart disease Father         Review of patient's allergies indicates:   Allergen Reactions    Iodine and iodide containing products Hives          Current Outpatient Medications:     albuterol (PROVENTIL/VENTOLIN HFA) 90 mcg/actuation inhaler, Inhale 1-2 puffs into the lungs every 6 (six) hours as needed for Wheezing or Shortness of Breath. Rescue, Disp: 18 g, Rfl: 11    albuterol-ipratropium (DUO-NEB) 2.5 mg-0.5 mg/3 mL nebulizer solution, Take 3 mLs by nebulization every 4 (four) hours as needed for Wheezing. Rescue, Disp: 75 mL, Rfl: 11    amlodipine-olmesartan (TODD) 5-40 mg per tablet, Take 1 tablet by mouth once daily. Replaces lisinopril., Disp: 90 tablet, Rfl: 1    aspirin (ECOTRIN) 81 MG EC tablet, Take 1 tablet (81 mg total) by mouth every evening. heart, Disp: 90 tablet, Rfl: 3    atorvastatin (LIPITOR) 40 MG tablet, Take 1 tablet (40 mg total) by mouth every evening. cholesterol, Disp: 90 tablet, Rfl: 3    betamethasone dipropionate 0.05 % cream, Apply topically 2 (two) times daily as needed (itching)., Disp: 45 g, Rfl: 5    blood sugar diagnostic Strp, To check BG 3 times daily, to use with insurance preferred meter, Disp: 200 strip, Rfl: 11    budesonide-glycopyr-formoterol (BREZTRI AEROSPHERE) 160-9-4.8 mcg/actuation HFAA, Inhale 2 puffs into the lungs 2 (two) times a day., Disp: 32.1 g, Rfl: 3    busPIRone (BUSPAR) 15 MG tablet, Take 1 tablet (15 mg total) by mouth 2 (two) times daily. Buspar, Disp: 180 tablet, Rfl: 3    cholecalciferol, vitamin D3, 1,250 mcg (50,000 unit) Tab, Take 1 tablet by mouth once a week., Disp: 12 tablet, Rfl: 3    cyanocobalamin 1,000 mcg/mL injection, Inject 1 mL (1,000 mcg total) into the skin every 30 days., Disp: 3 mL, Rfl: 3    diclofenac  sodium (VOLTAREN) 1 % Gel, Apply 2 g topically 3 (three) times daily., Disp: 100 g, Rfl: 2    EScitalopram oxalate (LEXAPRO) 20 MG tablet, Take 1 tablet (20 mg total) by mouth every morning. Depression/anxiety, Disp: 90 tablet, Rfl: 3    ibuprofen (ADVIL,MOTRIN) 600 MG tablet, Take 1 tablet (600 mg total) by mouth every 8 (eight) hours as needed for Pain., Disp: 60 tablet, Rfl: 2    lancets Misc, To check BG 3 times daily, to use with insurance preferred meter, Disp: 200 each, Rfl: 11    LANTUS U-100 INSULIN 100 unit/mL injection, Inject 50 Units into the skin every evening., Disp: , Rfl:     metFORMIN (GLUCOPHAGE) 1000 MG tablet, Take 1 tablet (1,000 mg total) by mouth 2 (two) times daily. Diabetes, Disp: 180 tablet, Rfl: 3    nitroGLYCERIN (NITROSTAT) 0.4 MG SL tablet, Place 1 tablet (0.4 mg total) under the tongue every 5 (five) minutes as needed for Chest pain., Disp: 30 tablet, Rfl: 3    oxybutynin (DITROPAN-XL) 10 MG 24 hr tablet, Take 1 tablet (10 mg total) by mouth once daily., Disp: 90 tablet, Rfl: 3    semaglutide (OZEMPIC) 1 mg/dose (4 mg/3 mL), Inject 1 mg into the skin every 7 days., Disp: 3 mL, Rfl: 11    tiZANidine (ZANAFLEX) 4 MG tablet, Take 1 tablet (4 mg total) by mouth every 6 (six) hours as needed (calf pain)., Disp: 30 tablet, Rfl: 5    Follow-up      Review of Systems   Constitutional: Negative.    HENT: Negative.     Eyes: Negative.    Respiratory:  Positive for shortness of breath.    Cardiovascular: Negative.    Gastrointestinal: Negative.    Endocrine: Negative.    Genitourinary: Negative.    Musculoskeletal:  Positive for back pain.   Skin: Negative.    Allergic/Immunologic: Negative.    Neurological: Negative.    Hematological: Negative.    Psychiatric/Behavioral: Negative.         Objective:      Physical Exam  Vitals and nursing note reviewed. Exam conducted with a chaperone present (Chepe).   Constitutional:       General: She is not in acute distress.     Appearance: Normal  appearance. She is well-developed. She is obese. She is not ill-appearing.   HENT:      Head: Normocephalic.   Eyes:      Conjunctiva/sclera: Conjunctivae normal.   Neck:      Thyroid: No thyromegaly.   Cardiovascular:      Rate and Rhythm: Normal rate and regular rhythm.      Heart sounds: Normal heart sounds. No murmur heard.  Pulmonary:      Effort: Pulmonary effort is normal.      Breath sounds: Normal breath sounds. No wheezing or rales.   Musculoskeletal:         General: Normal range of motion.      Cervical back: Normal range of motion.      Right lower leg: No edema.      Left lower leg: No edema.   Skin:     General: Skin is warm and dry.   Neurological:      Mental Status: She is alert and oriented to person, place, and time. Mental status is at baseline.   Psychiatric:         Mood and Affect: Mood normal.         Behavior: Behavior normal.         Thought Content: Thought content normal.         Judgment: Judgment normal.         Assessment:       1. Hypertension associated with diabetes    2. B12 deficiency    3. Rash        Plan:     1- NP called pt after visit and is only taking Ozempic 0.5 mg thus asked daughter to call [facility] as this NP sent 1 yr supply of 1 mg 8/30/24 and endocrine on 9/3 sent 1 mg.   2- will fax this note and labs to endocrine  3- pt to confirm home meds with this med list.  4- NF labs in 3 mo then see NP  -----   Discussed inc Jardiance to 25 mg since no c/o yeast but maybe A1c will dec with ozempic 1 mg.  -----  Hypertension associated with diabetes  -     lancets Misc; To check BG 3 times daily, to use with insurance preferred meter  Dispense: 200 each; Refill: 11  -     blood sugar diagnostic Strp; To check BG 3 times daily, to use with insurance preferred meter  Dispense: 200 strip; Refill: 11  -     Hemoglobin A1C; Future; Expected date: 10/04/2024  -     Comprehensive Metabolic Panel; Future; Expected date: 10/04/2024    B12 deficiency  -     cyanocobalamin 1,000 mcg/mL  injection; Inject 1 mL (1,000 mcg total) into the skin every 30 days.  Dispense: 3 mL; Refill: 3    Rash  -     betamethasone dipropionate 0.05 % cream; Apply topically 2 (two) times daily as needed (itching).  Dispense: 45 g; Refill: 5        Risks, benefits, and side effects were discussed with the patient. All questions were answered to the fullest satisfaction of the patient, and pt verbalized understanding and agreement to treatment plan. Pt was to call with any new or worsening symptoms, or present to the ER.

## 2024-10-07 ENCOUNTER — TELEPHONE (OUTPATIENT)
Dept: GASTROENTEROLOGY | Facility: CLINIC | Age: 69
End: 2024-10-07
Payer: MEDICARE

## 2024-10-07 ENCOUNTER — NURSE TRIAGE (OUTPATIENT)
Dept: ADMINISTRATIVE | Facility: CLINIC | Age: 69
End: 2024-10-07
Payer: MEDICARE

## 2024-10-07 NOTE — TELEPHONE ENCOUNTER
----- Message from Ekaterina sent at 10/7/2024 12:54 PM CDT -----  Type:  Needs Medical Advice    Who Called:  Pt's daughter Ely    Would the patient rather a call back or a response via [facility]?  Call back    Best Call Back Number:  [phone] or [phone]    Additional Information:  Needing prep information put in portal that pt has to do for colonoscopy.   Please call back to advise. Thanks!

## 2024-10-07 NOTE — TELEPHONE ENCOUNTER
Patient requesting assistance with colonoscopy prep instructions. The instructions were reviewed verbally and re-sent to the patient's portal messages. Patient verbalizes understanding. Advised the patient to call back with any further questions or concerns.       Reason for Disposition   Question about upcoming scheduled surgery, procedure or test, no triage required, and triager able to answer question    Protocols used: Information Only Call - No Triage-A-

## 2024-10-09 ENCOUNTER — ANESTHESIA EVENT (OUTPATIENT)
Dept: ENDOSCOPY | Facility: HOSPITAL | Age: 69
End: 2024-10-09
Payer: MEDICARE

## 2024-10-09 ENCOUNTER — ANESTHESIA (OUTPATIENT)
Dept: ENDOSCOPY | Facility: HOSPITAL | Age: 69
End: 2024-10-09
Payer: MEDICARE

## 2024-10-09 ENCOUNTER — HOSPITAL ENCOUNTER (OUTPATIENT)
Facility: HOSPITAL | Age: 69
Discharge: HOME | End: 2024-10-09
Attending: INTERNAL MEDICINE | Admitting: INTERNAL MEDICINE
Payer: MEDICARE

## 2024-10-09 DIAGNOSIS — Z86.0100 HISTORY OF COLON POLYPS: ICD-10-CM

## 2024-10-09 PROCEDURE — 25000003 PHARM REV CODE 250: Performed by: INTERNAL MEDICINE

## 2024-10-09 PROCEDURE — G0105 COLORECTAL SCRN; HI RISK IND: HCPCS | Performed by: INTERNAL MEDICINE

## 2024-10-09 PROCEDURE — 63600175 PHARM REV CODE 636 W HCPCS: Performed by: NURSE ANESTHETIST, CERTIFIED REGISTERED

## 2024-10-09 PROCEDURE — G0105 COLORECTAL SCRN; HI RISK IND: HCPCS | Mod: ,,, | Performed by: INTERNAL MEDICINE

## 2024-10-09 PROCEDURE — 37000009 HC ANESTHESIA EA ADD 15 MINS: Performed by: INTERNAL MEDICINE

## 2024-10-09 PROCEDURE — 37000008 HC ANESTHESIA 1ST 15 MINUTES: Performed by: INTERNAL MEDICINE

## 2024-10-09 RX ORDER — SODIUM CHLORIDE 9 MG/ML
INJECTION, SOLUTION INTRAVENOUS CONTINUOUS
Status: DISCONTINUED | OUTPATIENT
Start: 2024-10-09 | End: 2024-10-09 | Stop reason: HOSPADM

## 2024-10-09 RX ORDER — PROPOFOL 10 MG/ML
VIAL (ML) INTRAVENOUS
Status: DISCONTINUED | OUTPATIENT
Start: 2024-10-09 | End: 2024-10-09

## 2024-10-09 RX ORDER — LIDOCAINE HYDROCHLORIDE 20 MG/ML
INJECTION INTRAVENOUS
Status: DISCONTINUED | OUTPATIENT
Start: 2024-10-09 | End: 2024-10-09

## 2024-10-09 RX ADMIN — PROPOFOL 40 MG: 10 INJECTION, EMULSION INTRAVENOUS at 10:10

## 2024-10-09 RX ADMIN — PROPOFOL 30 MG: 10 INJECTION, EMULSION INTRAVENOUS at 10:10

## 2024-10-09 RX ADMIN — PROPOFOL 100 MG: 10 INJECTION, EMULSION INTRAVENOUS at 10:10

## 2024-10-09 RX ADMIN — SODIUM CHLORIDE: 9 INJECTION, SOLUTION INTRAVENOUS at 09:10

## 2024-10-09 RX ADMIN — LIDOCAINE HYDROCHLORIDE 100 MG: 20 INJECTION, SOLUTION INTRAVENOUS at 10:10

## 2024-10-09 NOTE — ANESTHESIA POSTPROCEDURE EVALUATION
Anesthesia Post Evaluation    Patient: Syl Matos    Procedure(s) Performed: Procedure(s) (LRB):  COLONOSCOPY (N/A)    Final Anesthesia Type: general      Patient location during evaluation: PACU  Patient participation: Yes- Able to Participate  Level of consciousness: awake and alert  Post-procedure vital signs: reviewed and stable  Pain management: adequate  Airway patency: patent    PONV status at discharge: No PONV  Anesthetic complications: no      Cardiovascular status: blood pressure returned to baseline  Respiratory status: unassisted and room air  Hydration status: euvolemic  Follow-up not needed.              Vitals Value Taken Time   /58 10/09/24 1125   Temp 36.5 °C (97.7 °F) 10/09/24 1124   Pulse 68 10/09/24 1126   Resp 17 10/09/24 1126   SpO2 99 % 10/09/24 1126   Vitals shown include unfiled device data.      Event Time   Out of Recovery 11:54:20         Pain/Bijan Score: Bijan Score: 10 (10/9/2024 11:23 AM)

## 2024-10-09 NOTE — H&P
[facility] Note    CC: History of colon polyps    HPI 69 y.o. female presents for surveillance colonoscopy due to history of polyps, last colonoscopy . Her brother  of colon cancer in his 40's.     Past Medical History:   Diagnosis Date    Anxiety and depression     Atrial fibrillation 2022    CKD (chronic kidney disease)     COPD (chronic obstructive pulmonary disease)     Diabetes mellitus     Graves disease     Hypercholesteremia     Hypertension     Hypertensive retinopathy of both eyes 2024    Joey Landis MD    Personal history of colonic polyps 2016    Husam Brambila MD    Sleep apnea        Allergies and Medications reviewed     Review of Systems  General ROS: negative for - chills, fever or weight loss  Cardiovascular ROS: no chest pain or dyspnea on exertion  Gastrointestinal ROS: no vomiting    Physical Examination  /62 (BP Location: Left arm, Patient Position: Lying)   Pulse 81   Temp 97.7 °F (36.5 °C) (Skin)   Resp 16   SpO2 97%   Breastfeeding No   General appearance: alert, cooperative, no distress  HENT: Normocephalic, atraumatic, neck symmetrical, no nasal discharge, sclera anicteric   Lungs: clear to auscultation bilaterally, symmetric chest wall expansion bilaterally  Heart: regular rate and rhythm without rub; no displacement of the PMI   Abdomen: soft  Extremities: extremities symmetric; no clubbing, cyanosis, or edema        Labs:  Lab Results   Component Value Date    WBC 10.74 10/03/2024    HGB 12.4 10/03/2024    HCT 38.2 10/03/2024    MCV 95 10/03/2024     10/03/2024           Assessment:   69 y.o. female presents for colonoscopy    Plan:  -Proceed to colonoscopy    Lena Hunter MD  [facility]  [address], [address]  [place] [address]  Office: [phone]  Fax: [phone]

## 2024-10-09 NOTE — PROVATION PATIENT INSTRUCTIONS
Discharge Summary/Instructions after an Endoscopic Procedure  Patient Name: Syl Matos  Patient MRN: [id number]  Patient YOB: 1955 Wednesday, October 9, 2024  Lena Hunter MD  Dear patient,  As a result of recent federal legislation (The Federal Cures Act), you may   receive lab or pathology results from your procedure in your [facility]   account before your physician is able to contact you. Your physician or   their representative will relay the results to you with their   recommendations at their soonest availability.  Thank you,  RESTRICTIONS:  During your procedure today, you received medications for sedation.  These   medications may affect your judgment, balance and coordination.  Therefore,   for 24 hours, you have the following restrictions:   - DO NOT drive a car, operate machinery, make legal/financial decisions,   sign important papers or drink alcohol.    ACTIVITY:  Today: no heavy lifting, straining or running due to procedural   sedation/anesthesia.  The following day: return to full activity including work.  DIET:  Eat and drink normally unless instructed otherwise.     TREATMENT FOR COMMON SIDE EFFECTS:  - Mild abdominal pain, nausea, belching, bloating or excessive gas:  rest,   eat lightly and use a heating pad.  - Sore Throat: treat with throat lozenges and/or gargle with warm salt   water.  - Because air was used during the procedure, expelling large amounts of air   from your rectum or belching is normal.  - If a bowel prep was taken, you may not have a bowel movement for 1-3 days.    This is normal.  SYMPTOMS TO WATCH FOR AND REPORT TO YOUR PHYSICIAN:  1. Abdominal pain or bloating, other than gas cramps.  2. Chest pain.  3. Back pain.  4. Signs of infection such as: chills or fever occurring within 24 hours   after the procedure.  5. Rectal bleeding, which would show as bright red, maroon, or black stools.   (A tablespoon of blood from the rectum is not serious,  especially if   hemorrhoids are present.)  6. Vomiting.  7. Weakness or dizziness.  GO DIRECTLY TO THE NEAREST EMERGENCY ROOM IF YOU HAVE ANY OF THE FOLLOWING:      Difficulty breathing              Chills and/or fever over 101 F   Persistent vomiting and/or vomiting blood   Severe abdominal pain   Severe chest pain   Black, tarry stools   Bleeding- more than one tablespoon   Any other symptom or condition that you feel may need urgent attention  Your doctor recommends these additional instructions:  If any biopsies were taken, your doctors clinic will contact you in 1 to 2   weeks with any results.  - Discharge patient to home (with escort).   - Patient has a contact number available for emergencies.  The signs and   symptoms of potential delayed complications were discussed with the   patient.  Return to normal activities tomorrow.  Written discharge   instructions were provided to the patient.   - Resume previous diet.   - Continue present medications.   - Repeat colonoscopy in 5 years for screening purposes.   - Return to my office PRN.  For questions, problems or results please call your physician - Lena Hunter MD at Work:  [phone].  [facility], EMERGENCY ROOM PHONE NUMBER: [phone]  IF A COMPLICATION OR EMERGENCY SITUATION ARISES AND YOU ARE UNABLE TO REACH   YOUR PHYSICIAN - GO DIRECTLY TO THE EMERGENCY ROOM.  Lena Hunter MD  10/9/2024 11:03:34 AM  This report has been verified and signed electronically.  Dear patient,  As a result of recent federal legislation (The Federal Cures Act), you may   receive lab or pathology results from your procedure in your [facility]   account before your physician is able to contact you. Your physician or   their representative will relay the results to you with their   recommendations at their soonest availability.  Thank you,  PROVATION

## 2024-10-09 NOTE — TRANSFER OF CARE
Anesthesia Transfer of Care Note    Patient: Syl Matos    Procedure(s) Performed: Procedure(s) (LRB):  COLONOSCOPY (N/A)    Patient location: PACU    Anesthesia Type: general    Transport from OR: Transported from OR on room air with adequate spontaneous ventilation    Post pain: adequate analgesia    Post assessment: no apparent anesthetic complications    Post vital signs: stable    Level of consciousness: responds to stimulation    Nausea/Vomiting: no nausea/vomiting    Complications: none    Transfer of care protocol was followed      Last vitals: Visit Vitals  /62 (BP Location: Left arm, Patient Position: Lying)   Pulse 81   Temp 36.5 °C (97.7 °F) (Skin)   Resp 16   SpO2 97%   Breastfeeding No

## 2024-10-09 NOTE — ANESTHESIA PREPROCEDURE EVALUATION
10/09/2024  Syl Matos is a 69 y.o., female.      Pre-op Assessment          Review of Systems  Cardiovascular:     Hypertension                                  Hypertension     Atrial Fibrillation     Pulmonary:   COPD     Sleep Apnea    Chronic Obstructive Pulmonary Disease (COPD):           Obstructive Sleep Apnea (AISSATOU).           Renal/:  Chronic Renal Disease        Kidney Function/Disease             Endocrine:  Diabetes    Diabetes                      Psych:  Psychiatric History                  Physical Exam  General: Well nourished        Anesthesia Plan  Type of Anesthesia, risks & benefits discussed:    Anesthesia Type: Gen Natural Airway  Intra-op Monitoring Plan: Standard ASA Monitors  Induction:  IV  Informed Consent: Informed consent signed with the Patient and all parties understand the risks and agree with anesthesia plan.  All questions answered.   ASA Score: 3  Day of Surgery Review of History & Physical: H&P Update referred to the surgeon/provider.    Ready For Surgery From Anesthesia Perspective.     .

## 2024-10-10 VITALS
OXYGEN SATURATION: 99 % | RESPIRATION RATE: 13 BRPM | TEMPERATURE: 98 F | DIASTOLIC BLOOD PRESSURE: 59 MMHG | HEART RATE: 66 BPM | SYSTOLIC BLOOD PRESSURE: 105 MMHG

## 2024-10-21 ENCOUNTER — TELEPHONE (OUTPATIENT)
Dept: RADIOLOGY | Facility: HOSPITAL | Age: 69
End: 2024-10-21

## 2024-10-23 ENCOUNTER — HOSPITAL ENCOUNTER (OUTPATIENT)
Dept: PULMONOLOGY | Facility: HOSPITAL | Age: 69
Discharge: HOME | End: 2024-10-23
Attending: NURSE PRACTITIONER
Payer: MEDICARE

## 2024-10-23 ENCOUNTER — HOSPITAL ENCOUNTER (OUTPATIENT)
Dept: RADIOLOGY | Facility: HOSPITAL | Age: 69
Discharge: HOME | End: 2024-10-23
Attending: NURSE PRACTITIONER
Payer: MEDICARE

## 2024-10-23 DIAGNOSIS — R06.09 DYSPNEA ON EXERTION: ICD-10-CM

## 2024-10-23 LAB
ERV LLN: -16449.34
ERV PRE REF: 62.1 %
ERV REF: 0.66
FEF 25 75 CHG: -22.8 %
FEF 25 75 LLN: 1.18
FEF 25 75 POST REF: 61.7 %
FEF 25 75 PRE REF: 80 %
FEF 25 75 REF: 2.58
FET100 CHG: 4.8 %
FEV1 CHG: 1.3 %
FEV1 FVC CHG: 0.6 %
FEV1 FVC LLN: 66
FEV1 FVC POST REF: 109.5 %
FEV1 FVC PRE REF: 108.9 %
FEV1 FVC REF: 79
FEV1 LLN: 1.55
FEV1 POST REF: 79.6 %
FEV1 PRE REF: 78.6 %
FEV1 REF: 2.11
FRCPLETH LLN: 1.77
FRCPLETH PREREF: 94.8 %
FRCPLETH REF: 2.6
FVC CHG: 0.7 %
FVC LLN: 1.99
FVC POST REF: 72.2 %
FVC PRE REF: 71.7 %
FVC REF: 2.7
MVV LLN: 65
MVV PRE REF: 61.2 %
MVV REF: 77
PEF CHG: 15.1 %
PEF LLN: 3.88
PEF POST REF: 49.6 %
PEF PRE REF: 43.1 %
PEF REF: 5.49
POST FEF 25 75: 1.59 L/S (ref 1.18–3.97)
POST FET 100: 2.5 SEC
POST FEV1 FVC: 86.11 % (ref 65.53–89.85)
POST FEV1: 1.68 L (ref 1.55–2.65)
POST FVC: 1.95 L (ref 1.99–3.46)
POST PEF: 2.72 L/S (ref 3.88–7.1)
PRE ERV: 0.41 L (ref -16449.34–16450.66)
PRE FEF 25 75: 2.06 L/S (ref 1.18–3.97)
PRE FET 100: 2.39 SEC
PRE FEV1 FVC: 85.59 % (ref 65.53–89.85)
PRE FEV1: 1.66 L (ref 1.55–2.65)
PRE FRC PL: 2.46 L (ref 1.77–3.42)
PRE FVC: 1.94 L (ref 1.99–3.46)
PRE MVV: 47.05 L/MIN (ref 65.3–88.34)
PRE PEF: 2.37 L/S (ref 3.88–7.1)
PRE RV: 2.05 L (ref 1.36–2.51)
PRE TLC: 3.99 L (ref 3.62–5.59)
RAW LLN: 3.06
RAW PRE REF: 100.8 %
RAW PRE: 3.08 CMH2O*S/L (ref 3.06–3.06)
RAW REF: 3.06
RV LLN: 1.36
RV PRE REF: 105.9 %
RV REF: 1.94
RVTLC LLN: 32
RVTLC PRE REF: 122.2 %
RVTLC PRE: 51.42 % (ref 32.49–51.67)
RVTLC REF: 42
TLC LLN: 3.62
TLC PRE REF: 86.7 %
TLC REF: 4.6
VC LLN: 1.99
VC PRE REF: 71.7 %
VC PRE: 1.94 L (ref 1.99–3.46)
VC REF: 2.7

## 2024-10-23 PROCEDURE — 94060 EVALUATION OF WHEEZING: CPT

## 2024-10-23 PROCEDURE — 94726 PLETHYSMOGRAPHY LUNG VOLUMES: CPT

## 2024-10-23 PROCEDURE — 94060 EVALUATION OF WHEEZING: CPT | Mod: 26,,, | Performed by: INTERNAL MEDICINE

## 2024-10-23 PROCEDURE — 71250 CT THORAX DX C-: CPT | Mod: 26,,, | Performed by: RADIOLOGY

## 2024-10-23 PROCEDURE — 94726 PLETHYSMOGRAPHY LUNG VOLUMES: CPT | Mod: 26,,, | Performed by: INTERNAL MEDICINE

## 2024-10-23 PROCEDURE — 94729 DIFFUSING CAPACITY: CPT

## 2024-10-23 PROCEDURE — 71250 CT THORAX DX C-: CPT | Mod: TC

## 2024-10-23 RX ORDER — ALBUTEROL SULFATE 2.5 MG/.5ML
SOLUTION RESPIRATORY (INHALATION)
Status: DISCONTINUED
Start: 2024-10-23 | End: 2024-10-23 | Stop reason: HOSPADM

## 2024-10-31 ENCOUNTER — TELEPHONE (OUTPATIENT)
Dept: PULMONOLOGY | Facility: CLINIC | Age: 69
End: 2024-10-31
Payer: MEDICARE

## 2024-11-14 ENCOUNTER — OFFICE VISIT (OUTPATIENT)
Dept: SPINE | Facility: CLINIC | Age: 69
End: 2024-11-14
Payer: MEDICARE

## 2024-11-14 VITALS — WEIGHT: 181 LBS | BODY MASS INDEX: 33.31 KG/M2 | HEIGHT: 62 IN

## 2024-11-14 DIAGNOSIS — M54.16 RIGHT LUMBAR RADICULITIS: ICD-10-CM

## 2024-11-14 DIAGNOSIS — M79.604 PAIN OF RIGHT LOWER EXTREMITY: Primary | ICD-10-CM

## 2024-11-14 PROCEDURE — 99999 PR PBB SHADOW E&M-EST. PATIENT-LVL III: CPT | Mod: PBBFAC,,, | Performed by: PHYSICAL MEDICINE & REHABILITATION

## 2024-11-14 NOTE — PROGRESS NOTES
SUBJECTIVE:    Patient ID: Syl Matos is a 69 y.o. female.    Chief Complaint: Follow-up    She is here for follow-up status post interlaminar injection L5-S1 on 09/05/2024 with Dr. Garcia.  Excellent response to that procedure.  She describes 95% improvement in her right calf pain with improved functional mobility.  She has a little residual discomfort in the calf but it is much better and comes less frequently.  She is currently satisfied with the quality of life.  Current pain level is 0/10          Past Medical History:   Diagnosis Date    Anxiety and depression     Atrial fibrillation 03/31/2022    CKD (chronic kidney disease)     COPD (chronic obstructive pulmonary disease)     Diabetes mellitus     Graves disease     Hypercholesteremia     Hypertension     Hypertensive retinopathy of both eyes 08/01/2024    Joey Landis MD    Personal history of colonic polyps 01/12/2016    Husam Brambila MD    Sleep apnea      Social History     Socioeconomic History    Marital status:     Number of children: 4    Highest education level: 4th grade   Occupational History    Occupation: [employer]   Tobacco Use    Smoking status: Never    Smokeless tobacco: Never   Substance and Sexual Activity    Alcohol use: Not Currently     Comment: social rare    Drug use: Never    Sexual activity: Not Currently     Partners: Male     Past Surgical History:   Procedure Laterality Date    COLONOSCOPY  01/12/2016    Husam Brambila MD    COLONOSCOPY N/A 10/9/2024    Procedure: COLONOSCOPY;  Surgeon: Lena Hunter MD;  Location: [facility] ENDO;  Service: Endoscopy;  Laterality: N/A;  ppm    EPIDURAL STEROID INJECTION INTO LUMBAR SPINE N/A 9/5/2024    Procedure: Injection-steroid-epidural-lumbar;  Surgeon: Royal Garcia MD;  Location: [facility] OR;  Service: Pain Management;  Laterality: N/A;    FRACTURE SURGERY      pelvis surgery    SHOULDER SURGERY Left     TUBAL LIGATION       Family History   Problem Relation Name Age of Onset    Heart  "disease Mother      Breast cancer Mother      Heart disease Father       Vitals:    11/14/24 1426   Weight: 82.1 kg (181 lb)   Height: 5' 2" (1.575 m)       Review of Systems   Constitutional:  Negative for chills, diaphoresis, fatigue, fever and unexpected weight change.   HENT:  Negative for trouble swallowing.    Eyes:  Negative for visual disturbance.   Respiratory:  Negative for shortness of breath.    Cardiovascular:  Negative for chest pain.   Gastrointestinal:  Negative for abdominal pain, constipation, nausea and vomiting.   Genitourinary:  Negative for difficulty urinating.   Musculoskeletal:  Negative for arthralgias, back pain, gait problem, joint swelling, myalgias, neck pain and neck stiffness.   Neurological:  Negative for dizziness, speech difficulty, weakness, light-headedness, numbness and headaches.          Objective:      Physical Exam  Neurological:      Mental Status: She is alert and oriented to person, place, and time.             Assessment:       1. Pain of right lower extremity    2. Right lumbar radiculitis           Plan:     Improved to her satisfaction status post interlaminar injection L5-S1.  We can repeat that procedure as needed.  Follow up here as needed      Pain of right lower extremity    Right lumbar radiculitis          "

## 2024-11-15 ENCOUNTER — OFFICE VISIT (OUTPATIENT)
Dept: PODIATRY | Facility: CLINIC | Age: 69
End: 2024-11-15
Payer: MEDICARE

## 2024-11-15 VITALS
SYSTOLIC BLOOD PRESSURE: 134 MMHG | WEIGHT: 181 LBS | HEART RATE: 77 BPM | BODY MASS INDEX: 33.31 KG/M2 | HEIGHT: 62 IN | DIASTOLIC BLOOD PRESSURE: 70 MMHG

## 2024-11-15 DIAGNOSIS — E11.65 TYPE 2 DIABETES MELLITUS WITH HYPERGLYCEMIA, WITH LONG-TERM CURRENT USE OF INSULIN: ICD-10-CM

## 2024-11-15 DIAGNOSIS — Z79.4 TYPE 2 DIABETES MELLITUS WITH HYPERGLYCEMIA, WITH LONG-TERM CURRENT USE OF INSULIN: ICD-10-CM

## 2024-11-15 DIAGNOSIS — S90.422S: Primary | ICD-10-CM

## 2024-11-15 DIAGNOSIS — L84 PRE-ULCERATIVE CALLUSES: ICD-10-CM

## 2024-11-15 DIAGNOSIS — M21.961 ACQUIRED DEFORMITY OF JOINT OF RIGHT FOOT: ICD-10-CM

## 2024-11-15 PROCEDURE — 99999 PR PBB SHADOW E&M-EST. PATIENT-LVL V: CPT | Mod: PBBFAC,,, | Performed by: PODIATRIST

## 2024-11-15 RX ORDER — TRAZODONE HYDROCHLORIDE 50 MG/1
50 TABLET ORAL NIGHTLY PRN
COMMUNITY
Start: 2024-10-22

## 2024-11-15 RX ORDER — EMPAGLIFLOZIN 10 MG/1
TABLET, FILM COATED ORAL
COMMUNITY
Start: 2024-11-06

## 2024-11-15 RX ORDER — ASPIRIN 325 MG
50000 TABLET, DELAYED RELEASE (ENTERIC COATED) ORAL
COMMUNITY
Start: 2024-10-10

## 2024-11-17 NOTE — PROGRESS NOTES
Subjective:       Patient ID: Syl Matos is a 69 y.o. female.    Chief Complaint: Follow-up, Diabetes Mellitus, and Nail Problem  Patient presents for follow-up diabetes, pre ulcerative calluses b/l hallux.  Relates blisters 1st and 2nd digit left foot.  Did get new shoes but they are comfortable, she did not notice any rubbing.  Has been checking the areas daily, they have remained dry, no swelling or redness, no pain.  Relates better control of diabetes and less neuropathy pain      Past Medical History:   Diagnosis Date    Anxiety and depression     Atrial fibrillation 03/31/2022    CKD (chronic kidney disease)     COPD (chronic obstructive pulmonary disease)     Diabetes mellitus     Graves disease     Hypercholesteremia     Hypertension     Hypertensive retinopathy of both eyes 08/01/2024    Joey Landis MD    Personal history of colonic polyps 01/12/2016    Husam Brambila MD    Sleep apnea      Past Surgical History:   Procedure Laterality Date    COLONOSCOPY  01/12/2016    Husam Brambila MD    COLONOSCOPY N/A 10/9/2024    Procedure: COLONOSCOPY;  Surgeon: Lena Hunter MD;  Location: [facility] ENDO;  Service: Endoscopy;  Laterality: N/A;  ppm    EPIDURAL STEROID INJECTION INTO LUMBAR SPINE N/A 9/5/2024    Procedure: Injection-steroid-epidural-lumbar;  Surgeon: Royal Garcia MD;  Location: [facility] OR;  Service: Pain Management;  Laterality: N/A;    FRACTURE SURGERY      pelvis surgery    SHOULDER SURGERY Left     TUBAL LIGATION       Family History   Problem Relation Name Age of Onset    Heart disease Mother      Breast cancer Mother      Heart disease Father       Social History     Socioeconomic History    Marital status:     Number of children: 4    Highest education level: 4th grade   Occupational History    Occupation: [employer]   Tobacco Use    Smoking status: Never    Smokeless tobacco: Never   Substance and Sexual Activity    Alcohol use: Not Currently     Comment: social rare    Drug use: Never     Sexual activity: Not Currently     Partners: Male       Current Outpatient Medications   Medication Sig Dispense Refill    albuterol (PROVENTIL/VENTOLIN HFA) 90 mcg/actuation inhaler Inhale 1-2 puffs into the lungs every 6 (six) hours as needed for Wheezing or Shortness of Breath. Rescue 18 g 11    albuterol-ipratropium (DUO-NEB) 2.5 mg-0.5 mg/3 mL nebulizer solution Take 3 mLs by nebulization every 4 (four) hours as needed for Wheezing. Rescue 75 mL 11    amlodipine-olmesartan (TODD) 5-40 mg per tablet Take 1 tablet by mouth once daily. Replaces lisinopril. 90 tablet 1    aspirin (ECOTRIN) 81 MG EC tablet Take 1 tablet (81 mg total) by mouth every evening. heart 90 tablet 3    atorvastatin (LIPITOR) 40 MG tablet Take 1 tablet (40 mg total) by mouth every evening. cholesterol 90 tablet 3    betamethasone dipropionate 0.05 % cream Apply topically 2 (two) times daily as needed (itching). 45 g 5    blood sugar diagnostic Strp To check BG 3 times daily, to use with insurance preferred meter 200 strip 11    budesonide-glycopyr-formoterol (BREZTRI AEROSPHERE) 160-9-4.8 mcg/actuation HFAA Inhale 2 puffs into the lungs 2 (two) times a day. 32.1 g 3    busPIRone (BUSPAR) 15 MG tablet Take 1 tablet (15 mg total) by mouth 2 (two) times daily. Buspar 180 tablet 3    cholecalciferol, vitamin D3, 1,250 mcg (50,000 unit) capsule Take 50,000 Units by mouth every 7 days.      cholecalciferol, vitamin D3, 1,250 mcg (50,000 unit) Tab Take 1 tablet by mouth once a week. 12 tablet 3    cyanocobalamin 1,000 mcg/mL injection Inject 1 mL (1,000 mcg total) into the skin every 30 days. 3 mL 3    diclofenac sodium (VOLTAREN) 1 % Gel Apply 2 g topically 3 (three) times daily. 100 g 2    EScitalopram oxalate (LEXAPRO) 20 MG tablet Take 1 tablet (20 mg total) by mouth every morning. Depression/anxiety 90 tablet 3    ibuprofen (ADVIL,MOTRIN) 600 MG tablet Take 1 tablet (600 mg total) by mouth every 8 (eight) hours as needed for Pain. 60 tablet 2  "   JARDIANCE 10 mg tablet TAKE 1 TABLET BY MOUTH ONCE DAILY IN THE MORNING FOR DIABETES      lancets [facility] To check BG 3 times daily, to use with insurance preferred meter 200 each 11    LANTUS U-100 INSULIN 100 unit/mL injection Inject 50 Units into the skin every evening.      metFORMIN (GLUCOPHAGE) 1000 MG tablet Take 1 tablet (1,000 mg total) by mouth 2 (two) times daily. Diabetes 180 tablet 3    nitroGLYCERIN (NITROSTAT) 0.4 MG SL tablet Place 1 tablet (0.4 mg total) under the tongue every 5 (five) minutes as needed for Chest pain. 30 tablet 3    oxybutynin (DITROPAN-XL) 10 MG 24 hr tablet Take 1 tablet (10 mg total) by mouth once daily. 90 tablet 3    semaglutide (OZEMPIC) 1 mg/dose (4 mg/3 mL) Inject 1 mg into the skin every 7 days. 3 mL 11    tiZANidine (ZANAFLEX) 4 MG tablet Take 1 tablet (4 mg total) by mouth every 6 (six) hours as needed (calf pain). 30 tablet 5    traZODone (DESYREL) 50 MG tablet Take 50 mg by mouth nightly as needed for Insomnia.       No current facility-administered medications for this visit.     Review of patient's allergies indicates:   Allergen Reactions    Iodine and iodide containing products Hives       Review of Systems   Cardiovascular:  Negative for leg swelling.   Endocrine:        DM 2 x 25 yrs   Musculoskeletal:  Negative for gait problem.   All other systems reviewed and are negative.      Objective:      Vitals:    11/15/24 1121   BP: 134/70   Pulse: 77   Weight: 82.1 kg (181 lb)   Height: 5' 2" (1.575 m)     Physical Exam  Vitals and nursing note reviewed.   Constitutional:       General: She is not in acute distress.  Cardiovascular:      Pulses:           Dorsalis pedis pulses are 2+ on the right side and 2+ on the left side.        Posterior tibial pulses are 1+ on the right side and 1+ on the left side.   Musculoskeletal:         General: No deformity.      Right foot: No deformity.      Left foot: No deformity.   Feet:      Right foot:      Skin integrity: Callus " (Mild callus medial IPJ right hallux) present.      Left foot:      Skin integrity: No blister (Dried healing blisters distal medial hallux and plantar 2nd digit left).   Skin:     Capillary Refill: Capillary refill takes 2 to 3 seconds.                        Assessment:       1. Blister of left great toe, sequela    2. Acquired deformity of joint of right foot    3. Pre-ulcerative calluses    4. Type 2 diabetes mellitus with hyperglycemia, with long-term current use of insulin                Plan:         We discussed blisters left foot definitely due to new shoes.  She does present with them today and they are wide.  Most likely some rubbing on the canvas material  Instructed patient to check her feet every afternoon and every evening  If shoes continue to cause blister she needs to discontinue them  Instructed to keep blisters dry, they are healing well, no soaking, no antibiotic ointment, preferably no bandage  Any redness or swelling apply iodine only  Callus debrided medial IPJ right hallux  Reviewed care and maintenance of skin  Moisturizer on heels  Checking feet daily  Reviewed diabetic education  Reviewed benefit of continued well-controlled daily glucose, I feel it has helped her neuropathy  Utilize the diclofenac gel as directed for any pain in her feet  Patient was in understanding and agreement with treatment plan.  I counseled the patient on their conditions, implications and medical management.  Instructed patient to contact the office with any changes, questions, concerns, worsening of symptoms.   Total face to face time 20 minutes, exam, assessment, treatment, discussion, additional time for review of chart prior to and following appointment and visit documentation, consultation and coordination of care.   Follow up 3 months    This note was created using [employer] voice recognition software that occasionally misinterpreted phrases or words.

## 2024-11-29 ENCOUNTER — TELEPHONE (OUTPATIENT)
Dept: FAMILY MEDICINE | Facility: CLINIC | Age: 69
End: 2024-11-29
Payer: MEDICARE

## 2024-11-30 NOTE — TELEPHONE ENCOUNTER
pt call to receive clarification on if she is suppose to be taking both bp meds amlodipine-olmesartan & Lisinopril 30 MG tabs. Thank you, Jany Lazo.

## 2024-12-05 ENCOUNTER — TELEPHONE (OUTPATIENT)
Dept: FAMILY MEDICINE | Facility: CLINIC | Age: 69
End: 2024-12-05
Payer: MEDICARE

## 2024-12-05 ENCOUNTER — PATIENT MESSAGE (OUTPATIENT)
Dept: FAMILY MEDICINE | Facility: CLINIC | Age: 69
End: 2024-12-05
Payer: MEDICARE

## 2024-12-05 DIAGNOSIS — J01.40 ACUTE NON-RECURRENT PANSINUSITIS: Primary | ICD-10-CM

## 2024-12-05 RX ORDER — AZITHROMYCIN 250 MG/1
TABLET, FILM COATED ORAL
Qty: 6 TABLET | Refills: 0 | Status: SHIPPED | OUTPATIENT
Start: 2024-12-05 | End: 2024-12-10

## 2024-12-05 NOTE — TELEPHONE ENCOUNTER
----- Message from Soledad sent at 12/5/2024  2:15 PM CST -----  Contact: Ely  Type: Needs Medical Advice  Who Called:  Ely/ Daughter  Symptoms (please be specific):  Cough  How long has patient had these symptoms: 1 day   Pharmacy name and phone #:      [facility] [address], [place] - [address] [address]  Phone: [phone] Fax: [phone]    Best Call Back Number: [phone]  Additional Information:   States mother is asking to get a prescription for cough syrup called into  pharm

## 2024-12-18 ENCOUNTER — OFFICE VISIT (OUTPATIENT)
Dept: PULMONOLOGY | Facility: CLINIC | Age: 69
End: 2024-12-18
Payer: MEDICARE

## 2024-12-18 VITALS
HEIGHT: 62 IN | OXYGEN SATURATION: 99 % | SYSTOLIC BLOOD PRESSURE: 125 MMHG | HEART RATE: 68 BPM | BODY MASS INDEX: 33.11 KG/M2 | DIASTOLIC BLOOD PRESSURE: 70 MMHG

## 2024-12-18 DIAGNOSIS — J44.9 CHRONIC OBSTRUCTIVE PULMONARY DISEASE, UNSPECIFIED COPD TYPE: Primary | ICD-10-CM

## 2024-12-18 PROCEDURE — 1159F MED LIST DOCD IN RCRD: CPT | Mod: CPTII,S$GLB,, | Performed by: NURSE PRACTITIONER

## 2024-12-18 PROCEDURE — 1101F PT FALLS ASSESS-DOCD LE1/YR: CPT | Mod: CPTII,S$GLB,, | Performed by: NURSE PRACTITIONER

## 2024-12-18 PROCEDURE — 3288F FALL RISK ASSESSMENT DOCD: CPT | Mod: CPTII,S$GLB,, | Performed by: NURSE PRACTITIONER

## 2024-12-18 PROCEDURE — 3061F NEG MICROALBUMINURIA REV: CPT | Mod: CPTII,S$GLB,, | Performed by: NURSE PRACTITIONER

## 2024-12-18 PROCEDURE — 99999 PR PBB SHADOW E&M-EST. PATIENT-LVL IV: CPT | Mod: PBBFAC,,, | Performed by: NURSE PRACTITIONER

## 2024-12-18 PROCEDURE — 3066F NEPHROPATHY DOC TX: CPT | Mod: CPTII,S$GLB,, | Performed by: NURSE PRACTITIONER

## 2024-12-18 PROCEDURE — 3051F HG A1C>EQUAL 7.0%<8.0%: CPT | Mod: CPTII,S$GLB,, | Performed by: NURSE PRACTITIONER

## 2024-12-18 PROCEDURE — 4010F ACE/ARB THERAPY RXD/TAKEN: CPT | Mod: CPTII,S$GLB,, | Performed by: NURSE PRACTITIONER

## 2024-12-18 PROCEDURE — 3008F BODY MASS INDEX DOCD: CPT | Mod: CPTII,S$GLB,, | Performed by: NURSE PRACTITIONER

## 2024-12-18 PROCEDURE — 3074F SYST BP LT 130 MM HG: CPT | Mod: CPTII,S$GLB,, | Performed by: NURSE PRACTITIONER

## 2024-12-18 PROCEDURE — 3078F DIAST BP <80 MM HG: CPT | Mod: CPTII,S$GLB,, | Performed by: NURSE PRACTITIONER

## 2024-12-18 PROCEDURE — 99213 OFFICE O/P EST LOW 20 MIN: CPT | Mod: S$GLB,,, | Performed by: NURSE PRACTITIONER

## 2024-12-18 RX ORDER — PREDNISONE 10 MG/1
TABLET ORAL
Qty: 18 TABLET | Refills: 0 | Status: SHIPPED | OUTPATIENT
Start: 2024-12-18

## 2024-12-18 RX ORDER — BLOOD-GLUCOSE SENSOR
EACH MISCELLANEOUS
COMMUNITY
Start: 2024-12-11
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
## 2025-01-27 ENCOUNTER — TELEPHONE (OUTPATIENT)
Dept: PODIATRY | Facility: CLINIC | Age: 70
End: 2025-01-27
Payer: MEDICARE

## 2025-01-27 NOTE — TELEPHONE ENCOUNTER
----- Message from Ivet sent at 1/27/2025 12:07 PM CST -----  Contact: Ely Clancy  Type:  Needs Medical Advice    Who Called:   Ely Clancy    Would the patient rather a call back or a response via [facility]?   Call back  Best Call Back Number:   [phone] - Ely    Additional Information:   States she would like to speak with the nurse to get patient set up with physical therapy - states they will need a referral - please call - thank you

## 2025-01-27 NOTE — TELEPHONE ENCOUNTER
Patient is requesting a referral for physical therapy sent to [facility]. NESTOR Esposito 01/27/2025

## 2025-01-27 NOTE — TELEPHONE ENCOUNTER
----- Message from [place] sent at 1/27/2025  4:32 PM CST -----  Contact: Self  Type:  Patient Returning Call    Who Called:  Pts Daughter Ely  Who Left Message for Patient:  Cate   Does the patient know what this is regarding?:  yes,   Best Call Back Number:  [phone]  Additional Information:  Stated she is requesting a referral for PT regarding her R leg, can we please call back to assist. Thank You.

## 2025-01-27 NOTE — TELEPHONE ENCOUNTER
Advised patient's daughter that patient will need to follow-up with her PCP regarding physical therapy since it pertains to her leg. Patient's daughter verbalized understanding. NESTOR Esposito 01/27/2025

## 2025-01-27 NOTE — TELEPHONE ENCOUNTER
----- Message from Radha Joe DPM sent at 1/27/2025  3:36 PM CST -----  Contact: Ely Clancy  What does she need physical therapy for?  What are her symptoms.  I have no documentation for any condition which requires physical therapy?  Did something change or did she have a fall?  ----- Message -----  From: Cate Hernandez LPN  Sent: 1/27/2025   1:42 PM CST  To: Radha Joe DPM    Patient is in need of a physical therapy referral to [facility].    Thank you,   NESTOR Esposito  ----- Message -----  From: Ivet Cary  Sent: 1/27/2025  12:09 PM CST  To: Tatyana Garcia Staff    Type:  Needs Medical Advice    Who Called:   Ely Clancy    Would the patient rather a call back or a response via [facility]?   Call back  Best Call Back Number:   [phone] - Ely    Additional Information:   States she would like to speak with the nurse to get patient set up with physical therapy - states they will need a referral - please call - thank you

## 2025-01-27 NOTE — TELEPHONE ENCOUNTER
Attempted to contact patient regarding physical therapy to see if there has been in a change in condition or to see what physical therapy is needed for. Patient did not answer and I was not able to leave a voicemail due to voicemail box being disabled. NESTOR Esposito 01/27/2025

## 2025-01-29 ENCOUNTER — DOCUMENTATION ONLY (OUTPATIENT)
Dept: FAMILY MEDICINE | Facility: CLINIC | Age: 70
End: 2025-01-29
Payer: MEDICARE

## 2025-02-21 ENCOUNTER — LAB VISIT (OUTPATIENT)
Dept: LAB | Facility: HOSPITAL | Age: 70
End: 2025-02-21
Attending: NURSE PRACTITIONER
Payer: MEDICARE

## 2025-02-21 ENCOUNTER — OFFICE VISIT (OUTPATIENT)
Dept: PODIATRY | Facility: CLINIC | Age: 70
End: 2025-02-21
Payer: MEDICARE

## 2025-02-21 VITALS
DIASTOLIC BLOOD PRESSURE: 64 MMHG | WEIGHT: 175.31 LBS | SYSTOLIC BLOOD PRESSURE: 142 MMHG | HEART RATE: 82 BPM | HEIGHT: 62 IN | BODY MASS INDEX: 32.26 KG/M2

## 2025-02-21 DIAGNOSIS — G57.93 NEUROPATHIC PAIN OF BOTH FEET: ICD-10-CM

## 2025-02-21 DIAGNOSIS — E11.9 TYPE 2 DIABETES MELLITUS WITHOUT COMPLICATION, WITH LONG-TERM CURRENT USE OF INSULIN: ICD-10-CM

## 2025-02-21 DIAGNOSIS — E11.9 COMPREHENSIVE DIABETIC FOOT EXAMINATION, TYPE 2 DM, ENCOUNTER FOR: Primary | ICD-10-CM

## 2025-02-21 DIAGNOSIS — M21.962 ACQUIRED DEFORMITY OF JOINT OF LEFT FOOT: ICD-10-CM

## 2025-02-21 DIAGNOSIS — L84 PRE-ULCERATIVE CALLUSES: ICD-10-CM

## 2025-02-21 DIAGNOSIS — E11.42 TYPE 2 DIABETES MELLITUS WITH PERIPHERAL NEUROPATHY: ICD-10-CM

## 2025-02-21 DIAGNOSIS — Z79.4 TYPE 2 DIABETES MELLITUS WITHOUT COMPLICATION, WITH LONG-TERM CURRENT USE OF INSULIN: ICD-10-CM

## 2025-02-21 LAB
ESTIMATED AVG GLUCOSE: 151 MG/DL (ref 68–131)
HBA1C MFR BLD: 6.9 % (ref 4–5.6)

## 2025-02-21 PROCEDURE — 3008F BODY MASS INDEX DOCD: CPT | Mod: CPTII,S$GLB,, | Performed by: PODIATRIST

## 2025-02-21 PROCEDURE — 83036 HEMOGLOBIN GLYCOSYLATED A1C: CPT | Performed by: EMERGENCY MEDICINE

## 2025-02-21 PROCEDURE — 3078F DIAST BP <80 MM HG: CPT | Mod: CPTII,S$GLB,, | Performed by: PODIATRIST

## 2025-02-21 PROCEDURE — 36415 COLL VENOUS BLD VENIPUNCTURE: CPT | Performed by: EMERGENCY MEDICINE

## 2025-02-21 PROCEDURE — 99214 OFFICE O/P EST MOD 30 MIN: CPT | Mod: S$GLB,,, | Performed by: PODIATRIST

## 2025-02-21 PROCEDURE — 3077F SYST BP >= 140 MM HG: CPT | Mod: CPTII,S$GLB,, | Performed by: PODIATRIST

## 2025-02-21 PROCEDURE — 99999 PR PBB SHADOW E&M-EST. PATIENT-LVL III: CPT | Mod: PBBFAC,,, | Performed by: PODIATRIST

## 2025-02-21 PROCEDURE — 3044F HG A1C LEVEL LT 7.0%: CPT | Mod: CPTII,S$GLB,, | Performed by: PODIATRIST

## 2025-02-21 PROCEDURE — 1125F AMNT PAIN NOTED PAIN PRSNT: CPT | Mod: CPTII,S$GLB,, | Performed by: PODIATRIST

## 2025-02-21 PROCEDURE — 1159F MED LIST DOCD IN RCRD: CPT | Mod: CPTII,S$GLB,, | Performed by: PODIATRIST

## 2025-02-21 RX ORDER — PREGABALIN 100 MG/1
100 CAPSULE ORAL NIGHTLY
Qty: 30 CAPSULE | Refills: 0 | Status: SHIPPED | OUTPATIENT
Start: 2025-02-21 | End: 2025-03-23

## 2025-02-23 NOTE — PROGRESS NOTES
Subjective:       Patient ID: Syl Matos is a 69 y.o. female.    Chief Complaint: Foot Pain (Right foot, ongoing), Diabetic Foot Exam, and Nail Problem  Patient presents for annual diabetic foot exam, follow-up pre ulcerative calluses b/l hallux.  Patient relates pain due to calluses on the sides of the big toes relates overall her feet and legs have been much more painful over the last 3 months.  Patient relates she has taken Lyrica in the past.  She feels it was a very low-dose and was not helpful but inquires if she can try this medication again reports a pain level in her feet of 6/10  Does relate diabetes has remained well-controlled.  Went for blood work this morning  She will be changing to a [facility] doctor to accommodate transportation issues, relying on her nephew for rides to her medical appointments and using the same doctor as her daughter to make scheduling appointments easier        Past Medical History:   Diagnosis Date    Anxiety and depression     Atrial fibrillation 03/31/2022    CKD (chronic kidney disease)     COPD (chronic obstructive pulmonary disease)     Diabetes mellitus     Graves disease     Hypercholesteremia     Hypertension     Hypertensive retinopathy of both eyes 08/01/2024    Joey Landis MD    Personal history of colonic polyps 01/12/2016    Husam Brambila MD    Sleep apnea      Past Surgical History:   Procedure Laterality Date    COLONOSCOPY  01/12/2016    Husam Brambila MD    COLONOSCOPY N/A 10/9/2024    Procedure: COLONOSCOPY;  Surgeon: Lena Hunter MD;  Location: [facility];  Service: Endoscopy;  Laterality: N/A;  ppm    EPIDURAL STEROID INJECTION INTO LUMBAR SPINE N/A 9/5/2024    Procedure: Injection-steroid-epidural-lumbar;  Surgeon: Royal Garcia MD;  Location: [facility];  Service: Pain Management;  Laterality: N/A;    FRACTURE SURGERY      pelvis surgery    SHOULDER SURGERY Left     TUBAL LIGATION       Family History   Problem Relation Name Age of Onset    Heart  disease Mother      Breast cancer Mother      Heart disease Father       Social History     Socioeconomic History    Marital status:     Number of children: 4    Highest education level: 4th grade   Occupational History    Occupation: [employer]   Tobacco Use    Smoking status: Never    Smokeless tobacco: Never   Substance and Sexual Activity    Alcohol use: Not Currently     Comment: social rare    Drug use: Never    Sexual activity: Not Currently     Partners: Male       Current Outpatient Medications   Medication Sig Dispense Refill    albuterol (PROVENTIL/VENTOLIN HFA) 90 mcg/actuation inhaler Inhale 1-2 puffs into the lungs every 6 (six) hours as needed for Wheezing or Shortness of Breath. Rescue 18 g 11    albuterol-ipratropium (DUO-NEB) 2.5 mg-0.5 mg/3 mL nebulizer solution Take 3 mLs by nebulization every 4 (four) hours as needed for Wheezing. Rescue 75 mL 11    amlodipine-olmesartan (TODD) 5-40 mg per tablet Take 1 tablet by mouth once daily. Replaces lisinopril. 90 tablet 1    aspirin (ECOTRIN) 81 MG EC tablet Take 1 tablet (81 mg total) by mouth every evening. heart 90 tablet 3    atorvastatin (LIPITOR) 40 MG tablet Take 1 tablet (40 mg total) by mouth every evening. cholesterol 90 tablet 3    betamethasone dipropionate 0.05 % cream Apply topically 2 (two) times daily as needed (itching). 45 g 5    blood sugar diagnostic Strp To check BG 3 times daily, to use with insurance preferred meter 200 strip 11    budesonide-glycopyr-formoterol (BREZTRI AEROSPHERE) 160-9-4.8 mcg/actuation HFAA Inhale 2 puffs into the lungs 2 (two) times a day. 32.1 g 3    busPIRone (BUSPAR) 15 MG tablet Take 1 tablet (15 mg total) by mouth 2 (two) times daily. Buspar 180 tablet 3    cholecalciferol, vitamin D3, 1,250 mcg (50,000 unit) capsule Take 50,000 Units by mouth every 7 days.      cholecalciferol, vitamin D3, 1,250 mcg (50,000 unit) Tab Take 1 tablet by mouth once a week. 12 tablet 3    cyanocobalamin 1,000 mcg/mL  injection Inject 1 mL (1,000 mcg total) into the skin every 30 days. 3 mL 3    diclofenac sodium (VOLTAREN) 1 % Gel Apply 2 g topically 3 (three) times daily. 100 g 2    EScitalopram oxalate (LEXAPRO) 20 MG tablet Take 1 tablet (20 mg total) by mouth every morning. Depression/anxiety 90 tablet 3    FREESTYLE NICO 3 PLUS SENSOR Kasia CHANGE SENSOR EVERY 15 DAYS      ibuprofen (ADVIL,MOTRIN) 600 MG tablet Take 1 tablet (600 mg total) by mouth every 8 (eight) hours as needed for Pain. 60 tablet 2    JARDIANCE 10 mg tablet TAKE 1 TABLET BY MOUTH ONCE DAILY IN THE MORNING FOR DIABETES      lancets [facility] To check BG 3 times daily, to use with insurance preferred meter 200 each 11    LANTUS U-100 INSULIN 100 unit/mL injection Inject 50 Units into the skin every evening.      metFORMIN (GLUCOPHAGE) 1000 MG tablet Take 1 tablet (1,000 mg total) by mouth 2 (two) times daily. Diabetes 180 tablet 3    nitroGLYCERIN (NITROSTAT) 0.4 MG SL tablet Place 1 tablet (0.4 mg total) under the tongue every 5 (five) minutes as needed for Chest pain. 30 tablet 3    oxybutynin (DITROPAN-XL) 10 MG 24 hr tablet Take 1 tablet (10 mg total) by mouth once daily. 90 tablet 3    OZEMPIC 2 mg/dose (8 mg/3 mL) PnIj Inject 2 mg into the skin every 7 days.      predniSONE (DELTASONE) 10 MG tablet Take one pill a day for three days, repeat for shortness of breath 18 tablet 0    tiZANidine (ZANAFLEX) 4 MG tablet Take 1 tablet (4 mg total) by mouth every 6 (six) hours as needed (calf pain). 30 tablet 5    traZODone (DESYREL) 50 MG tablet Take 50 mg by mouth nightly as needed for Insomnia.      pregabalin (LYRICA) 100 MG capsule Take 1 capsule (100 mg total) by mouth every evening. 30 capsule 0     No current facility-administered medications for this visit.     Review of patient's allergies indicates:   Allergen Reactions    Iodine and iodide containing products Hives       Review of Systems   Cardiovascular:  Negative for leg swelling.   Endocrine:         "DM 2 x 25 yrs   Musculoskeletal:  Negative for gait problem.   All other systems reviewed and are negative.      Objective:      Vitals:    02/21/25 1102   BP: (!) 142/64   BP Location: Left arm   Patient Position: Sitting   Pulse: 82   Weight: 79.5 kg (175 lb 4.8 oz)   Height: 5' 2" (1.575 m)     Physical Exam  Vitals and nursing note reviewed.   Constitutional:       General: She is not in acute distress.     Appearance: Normal appearance.   Cardiovascular:      Pulses:           Dorsalis pedis pulses are 2+ on the right side and 2+ on the left side.        Posterior tibial pulses are 1+ on the right side and 1+ on the left side.   Pulmonary:      Effort: Pulmonary effort is normal.   Musculoskeletal:      Right foot: Deformity (Prominent IPJ bilateral hallux, mild pronation of foot bilateral upon weight-bearing) present.      Left foot: Deformity present.   Feet:      Right foot:      Protective Sensation: 5 sites tested.  5 sites sensed.      Skin integrity: Callus (Chronic preulcerative callus medial IPJ bilateral hallux) present.      Toenail Condition: Right toenails are long.      Left foot:      Protective Sensation: 5 sites tested.  5 sites sensed.      Skin integrity: Callus present.      Toenail Condition: Left toenails are long.   Skin:     Capillary Refill: Capillary refill takes 2 to 3 seconds.   Neurological:      General: No focal deficit present.      Mental Status: She is alert.      Comments: Sensation intact all areas bilateral feet with monofilament testing, increased pain/paresthesias feet   Psychiatric:         Behavior: Behavior normal.         Thought Content: Thought content normal.                       Hemoglobin A1C         Component Ref Range & Units 2 d ago  (2/21/25) 4 mo ago  (10/3/24) 8 mo ago  (6/17/24)   Hemoglobin A1C 4.0 - 5.6 % 6.9 High  7.9 High   7.8 High       Estimated Avg Glucose 68 - 131 mg/dL 151 High  180 High  177 High              Assessment:       1. Comprehensive " diabetic foot examination, type 2 DM, encounter for    2. Type 2 diabetes mellitus with peripheral neuropathy    3. Neuropathic pain of both feet    4. Acquired deformity of joint of left foot    5. Pre-ulcerative calluses          Plan:         LYRICA 100 MG 1 AT BEDTIME      Comprehensive diabetic pedal exam performed  Reviewed intact sensation in feet  Reviewed diabetic education  Reviewed A1c   Reviewed benefit of controled glucose/diabetes regarding potential foot problems especially healing and neuropathy  We had a lengthy discussion regarding neuropathy, diabetes related due to long history and some of this has been uncontrolled.  We reviewed contributing factors, DDD of the spine.  She has had 1 epidural in the past  We discussed Lyrica and explained to patient it is started at a low-dose to prevent side effects.  She was taking 75 mg in 2023, it is recommended we start with 100 at bedtime and we can increase his medications if well tolerated and beneficial.  We had a discussion regarding potential side effects of this medication  Patient was in agreement  Reviewed preulcerative calluses on the sides of the big toes  Reviewed potential complications due to location.  These have been blistered and discolored and bruised in the past  Had a lengthy discussion regarding appropriate shoes  Preulcerative debrided medial IPJ bilateral hallux  Reviewed care and maintenance of skin  Discussed care and maintenance of nails, nails debrided, no complications at this time  Check feet daily and contact office with any area of concern which has not improved in a few days  Patient was in understanding and agreement with treatment plan.  I counseled the patient on their conditions, implications and medical management.  Instructed patient to contact the office with any changes, questions, concerns, worsening of symptoms.   Total face to face time 30 minutes, exam, assessment, treatment, discussion, additional time for review  of chart prior to and following appointment and visit documentation, consultation and coordination of care.   Follow up 3 months    This note was created using [employer] voice recognition software that occasionally misinterpreted phrases or words.

## 2025-02-26 ENCOUNTER — HOSPITAL ENCOUNTER (EMERGENCY)
Facility: HOSPITAL | Age: 70
Discharge: HOME | End: 2025-02-26
Attending: STUDENT IN AN ORGANIZED HEALTH CARE EDUCATION/TRAINING PROGRAM
Payer: MEDICARE

## 2025-02-26 VITALS
SYSTOLIC BLOOD PRESSURE: 133 MMHG | RESPIRATION RATE: 20 BRPM | DIASTOLIC BLOOD PRESSURE: 63 MMHG | OXYGEN SATURATION: 100 % | WEIGHT: 170 LBS | TEMPERATURE: 98 F | BODY MASS INDEX: 31.28 KG/M2 | HEART RATE: 85 BPM | HEIGHT: 62 IN

## 2025-02-26 DIAGNOSIS — M79.661 RIGHT CALF PAIN: ICD-10-CM

## 2025-02-26 PROCEDURE — 93971 EXTREMITY STUDY: CPT | Mod: 26,RT,, | Performed by: RADIOLOGY

## 2025-02-26 PROCEDURE — 93971 EXTREMITY STUDY: CPT | Mod: TC,RT

## 2025-02-26 PROCEDURE — 99284 EMERGENCY DEPT VISIT MOD MDM: CPT | Mod: 25

## 2025-02-26 NOTE — ED NOTES
Pt states no known injury to leg, no hx of blood clots. Pt states the pain is behind her knee/ upper calf radiating to her foot on the right side. Pt states the pain is causing her insomnia. Capillary refill >3 sec. Awaiting US report. Received information while rounding.

## 2025-02-27 NOTE — ED PROVIDER NOTES
"CHIEF COMPLAINT  Chief Complaint   Patient presents with    Leg Pain     Right lower leg pain ongoing x weeks. Pt concerned about a "blood clot." Denies injury/trauma.       HISTORY OF PRESENT ILLNESS  Syl Matos is a 69 y.o. female with PMH as below who presents to ER for evaluation of right side calf pain for 2 weeks. She denies injury, ambulates with pain.  No other specific aggravating or relieving factors otherwise.      PAST MEDICAL HISTORY  Past Medical History:   Diagnosis Date    Anxiety and depression     Atrial fibrillation 03/31/2022    CKD (chronic kidney disease)     COPD (chronic obstructive pulmonary disease)     Diabetes mellitus     Graves disease     Hypercholesteremia     Hypertension     Hypertensive retinopathy of both eyes 08/01/2024    Joey Landis MD    Personal history of colonic polyps 01/12/2016    Husam Brambila MD    Sleep apnea        CURRENT MEDICATIONS    Current Medications[1]    ALLERGIES    Review of patient's allergies indicates:   Allergen Reactions    Iodine and iodide containing products Hives       SURGICAL HISTORY    Past Surgical History:   Procedure Laterality Date    COLONOSCOPY  01/12/2016    Husam Brambila MD    COLONOSCOPY N/A 10/9/2024    Procedure: COLONOSCOPY;  Surgeon: Lena Hunter MD;  [facility];  Service: Endoscopy;  Laterality: N/A;  ppm    EPIDURAL STEROID INJECTION INTO LUMBAR SPINE N/A 9/5/2024    Procedure: Injection-steroid-epidural-lumbar;  Surgeon: Royal Garcia MD;  Location: [facility];  Service: Pain Management;  Laterality: N/A;    FRACTURE SURGERY      pelvis surgery    SHOULDER SURGERY Left     TUBAL LIGATION         SOCIAL HISTORY    Social History     Socioeconomic History    Marital status:     Number of children: 4    Highest education level: 4th grade   Occupational History    Occupation: [employer]   Tobacco Use    Smoking status: Never    Smokeless tobacco: Never   Substance and Sexual Activity    Alcohol use: Not Currently " "    Comment: social rare    Drug use: Never    Sexual activity: Not Currently     Partners: Male       FAMILY HISTORY    Family History   Problem Relation Name Age of Onset    Heart disease Mother      Breast cancer Mother      Heart disease Father         REVIEW OF SYSTEMS    Review of Systems   Musculoskeletal:  Positive for joint pain and myalgias.     All other systems reviewed and are negative    VITAL SIGNS:   /63 (BP Location: Right arm, Patient Position: Sitting)   Pulse 85   Temp 98.1 °F (36.7 °C) (Oral)   Resp 20   Ht 5' 2" (1.575 m)   Wt 77.1 kg (170 lb)   SpO2 100%   Breastfeeding No   BMI 31.09 kg/m²      Physical Exam  Constitutional:       Appearance: Normal appearance. She is obese.   HENT:      Head: Normocephalic.   Cardiovascular:      Rate and Rhythm: Normal rate.   Pulmonary:      Effort: Pulmonary effort is normal. No respiratory distress.   Musculoskeletal:         General: Normal range of motion.      Right lower leg: Tenderness (calf area tenderness of palpation) present.      Right foot: Normal capillary refill. Normal pulse.      Left foot: Normal capillary refill. Normal pulse.        Legs:    Skin:     General: Skin is warm.      Capillary Refill: Capillary refill takes less than 2 seconds.   Neurological:      General: No focal deficit present.      Mental Status: She is alert.      GCS: GCS eye subscore is 4. GCS verbal subscore is 5. GCS motor subscore is 6.   Psychiatric:         Attention and Perception: Attention normal.         Mood and Affect: Mood normal.         Speech: Speech normal.       Vitals and nursing note reviewed.     LABS    Labs Reviewed - No data to display      EKG      RADIOLOGY    US Lower Extremity Veins Right   Final Result      No evidence of deep venous thrombosis in the right lower extremity.         Electronically signed by: Dorian Wadsworth   Date:    02/26/2025   Time:    15:33            PROCEDURES    Procedures    Medications - No data to " display             Medical Decision Making  Syl Matos is a 69 y.o. female with PMH as below who presents to ER for evaluation of right side calf pain for 2 weeks. She denies injury, ambulates with pain.  No other specific aggravating or relieving factors otherwise.    Ddx:Fracture, strain, sprain, tendonitis, neuropathy, DVT    Mild swelling, tenderness, long period of pain, age  Us DVT study ordered  Negative DVT    Problems Addressed:  Right calf pain: chronic illness or injury with exacerbation, progression, or side effects of treatment    Amount and/or Complexity of Data Reviewed  Radiology: ordered. Decision-making details documented in ED Course.     Details: Negative            Discharge Medication List as of 2/26/2025  4:06 PM          Discharge Medication List as of 2/26/2025  4:06 PM          I discussed with patient and/or family/caretaker that evaluation in the ED does not suggest any emergent or life threatening medical condition requiring immediate intervention beyond what was provided in the ED, and I believe the patient is safe for discharge.  Regardless, an unremarkable evaluation in the ED does not preclude the development or presence of a serious or life threatening condition. As such, patient was instructed to return immediately for any worsening or change in current symptoms  Patient agrees with plan of care.    DISPOSITION  Patient discharged to home in stable condition.        FINAL IMPRESSION    1. Right calf pain           [1] No current facility-administered medications for this encounter.    Current Outpatient Medications:     albuterol (PROVENTIL/VENTOLIN HFA) 90 mcg/actuation inhaler, Inhale 1-2 puffs into the lungs every 6 (six) hours as needed for Wheezing or Shortness of Breath. Rescue, Disp: 18 g, Rfl: 11    albuterol-ipratropium (DUO-NEB) 2.5 mg-0.5 mg/3 mL nebulizer solution, Take 3 mLs by nebulization every 4 (four) hours as needed for Wheezing. Rescue, Disp: 75 mL, Rfl:  11    amlodipine-olmesartan (TODD) 5-40 mg per tablet, Take 1 tablet by mouth once daily. Replaces lisinopril., Disp: 90 tablet, Rfl: 1    aspirin (ECOTRIN) 81 MG EC tablet, Take 1 tablet (81 mg total) by mouth every evening. heart, Disp: 90 tablet, Rfl: 3    atorvastatin (LIPITOR) 40 MG tablet, Take 1 tablet (40 mg total) by mouth every evening. cholesterol, Disp: 90 tablet, Rfl: 3    betamethasone dipropionate 0.05 % cream, Apply topically 2 (two) times daily as needed (itching)., Disp: 45 g, Rfl: 5    blood sugar diagnostic Strp, To check BG 3 times daily, to use with insurance preferred meter, Disp: 200 strip, Rfl: 11    budesonide-glycopyr-formoterol (BREZTRI AEROSPHERE) 160-9-4.8 mcg/actuation HFAA, Inhale 2 puffs into the lungs 2 (two) times a day., Disp: 32.1 g, Rfl: 3    busPIRone (BUSPAR) 15 MG tablet, Take 1 tablet (15 mg total) by mouth 2 (two) times daily. Buspar, Disp: 180 tablet, Rfl: 3    cholecalciferol, vitamin D3, 1,250 mcg (50,000 unit) capsule, Take 50,000 Units by mouth every 7 days., Disp: , Rfl:     cholecalciferol, vitamin D3, 1,250 mcg (50,000 unit) Tab, Take 1 tablet by mouth once a week., Disp: 12 tablet, Rfl: 3    cyanocobalamin 1,000 mcg/mL injection, Inject 1 mL (1,000 mcg total) into the skin every 30 days., Disp: 3 mL, Rfl: 3    diclofenac sodium (VOLTAREN) 1 % Gel, Apply 2 g topically 3 (three) times daily., Disp: 100 g, Rfl: 2    EScitalopram oxalate (LEXAPRO) 20 MG tablet, Take 1 tablet (20 mg total) by mouth every morning. Depression/anxiety, Disp: 90 tablet, Rfl: 3    FREESTYLE NICO 3 PLUS SENSOR Kasia, CHANGE SENSOR EVERY 15 DAYS, Disp: , Rfl:     ibuprofen (ADVIL,MOTRIN) 600 MG tablet, Take 1 tablet (600 mg total) by mouth every 8 (eight) hours as needed for Pain., Disp: 60 tablet, Rfl: 2    JARDIANCE 10 mg tablet, TAKE 1 TABLET BY MOUTH ONCE DAILY IN THE MORNING FOR DIABETES, Disp: , Rfl:     lancets Misc, To check BG 3 times daily, to use with insurance preferred meter,  Disp: 200 each, Rfl: 11    LANTUS U-100 INSULIN 100 unit/mL injection, Inject 50 Units into the skin every evening., Disp: , Rfl:     metFORMIN (GLUCOPHAGE) 1000 MG tablet, Take 1 tablet (1,000 mg total) by mouth 2 (two) times daily. Diabetes, Disp: 180 tablet, Rfl: 3    nitroGLYCERIN (NITROSTAT) 0.4 MG SL tablet, Place 1 tablet (0.4 mg total) under the tongue every 5 (five) minutes as needed for Chest pain., Disp: 30 tablet, Rfl: 3    oxybutynin (DITROPAN-XL) 10 MG 24 hr tablet, Take 1 tablet (10 mg total) by mouth once daily., Disp: 90 tablet, Rfl: 3    OZEMPIC 2 mg/dose (8 mg/3 mL) PnIj, Inject 2 mg into the skin every 7 days., Disp: , Rfl:     predniSONE (DELTASONE) 10 MG tablet, Take one pill a day for three days, repeat for shortness of breath, Disp: 18 tablet, Rfl: 0    pregabalin (LYRICA) 100 MG capsule, Take 1 capsule (100 mg total) by mouth every evening., Disp: 30 capsule, Rfl: 0    tiZANidine (ZANAFLEX) 4 MG tablet, Take 1 tablet (4 mg total) by mouth every 6 (six) hours as needed (calf pain)., Disp: 30 tablet, Rfl: 5    traZODone (DESYREL) 50 MG tablet, Take 50 mg by mouth nightly as needed for Insomnia., Disp: , Rfl:        Stephanie Jordan NP  02/26/25 2599

## 2025-03-13 ENCOUNTER — TELEPHONE (OUTPATIENT)
Dept: FAMILY MEDICINE | Facility: CLINIC | Age: 70
End: 2025-03-13
Payer: MEDICARE

## 2025-03-22 DIAGNOSIS — G57.93 NEUROPATHIC PAIN OF BOTH FEET: ICD-10-CM

## 2025-03-24 RX ORDER — PREGABALIN 100 MG/1
100 CAPSULE ORAL
Qty: 30 CAPSULE | Refills: 0 | Status: SHIPPED | OUTPATIENT
Start: 2025-03-24

## 2025-04-16 ENCOUNTER — TELEPHONE (OUTPATIENT)
Dept: FAMILY MEDICINE | Facility: CLINIC | Age: 70
End: 2025-04-16
Payer: MEDICARE

## 2025-04-21 ENCOUNTER — TELEPHONE (OUTPATIENT)
Dept: SPINE | Facility: CLINIC | Age: 70
End: 2025-04-21
Payer: MEDICARE

## 2025-04-21 NOTE — TELEPHONE ENCOUNTER
----- Message from Adelaide sent at 4/21/2025  8:35 AM CDT -----  Regarding: appointment  Contact: Ely momin  Type:  Sooner Appointment RequestCaller is requesting a sooner appointment.  Caller declined first available appointment listed below.  Caller will not accept being placed on the waitlist and is requesting a message be sent to doctor.Name of Caller:  Ely daughterWhen is the first available appointment?  Symptoms:  back painWould the patient rather a call back or a response via [facility]? callBest Call Back Number:  [phone] Information:  Please call daughter to advise.  Thanks!

## 2025-04-21 NOTE — TELEPHONE ENCOUNTER
Spoke with the pt's daughter. Offered an appointment for today of which was declined. Appointment scheduled for tomorrow per request.

## 2025-04-25 DIAGNOSIS — G57.93 NEUROPATHIC PAIN OF BOTH FEET: ICD-10-CM

## 2025-04-28 RX ORDER — PREGABALIN 100 MG/1
100 CAPSULE ORAL
Qty: 30 CAPSULE | Refills: 0 | Status: SHIPPED | OUTPATIENT
Start: 2025-04-28

## 2025-05-06 ENCOUNTER — OFFICE VISIT (OUTPATIENT)
Dept: PODIATRY | Facility: CLINIC | Age: 70
End: 2025-05-06
Payer: MEDICARE

## 2025-05-06 VITALS
RESPIRATION RATE: 18 BRPM | SYSTOLIC BLOOD PRESSURE: 113 MMHG | WEIGHT: 168 LBS | HEART RATE: 66 BPM | DIASTOLIC BLOOD PRESSURE: 56 MMHG | BODY MASS INDEX: 30.91 KG/M2 | HEIGHT: 62 IN

## 2025-05-06 DIAGNOSIS — E08.42 DIABETIC POLYNEUROPATHY ASSOCIATED WITH DIABETES MELLITUS DUE TO UNDERLYING CONDITION: Primary | ICD-10-CM

## 2025-05-06 DIAGNOSIS — M21.962 ACQUIRED DEFORMITY OF JOINT OF LEFT FOOT: ICD-10-CM

## 2025-05-06 DIAGNOSIS — M21.961 ACQUIRED DEFORMITY OF JOINT OF RIGHT FOOT: ICD-10-CM

## 2025-05-06 DIAGNOSIS — L84 PRE-ULCERATIVE CALLUSES: ICD-10-CM

## 2025-05-06 PROCEDURE — 99999 PR PBB SHADOW E&M-EST. PATIENT-LVL V: CPT | Mod: PBBFAC,,, | Performed by: PODIATRIST

## 2025-05-09 NOTE — PROGRESS NOTES
Subjective:       Patient ID: Syl Matos is a 69 y.o. female.    Chief Complaint: Follow-up, Callouses, Foot Pain, Diabetes Mellitus, and Peripheral Neuropathy  Patient presents for follow-up due to type 2 diabetes, diabetic neuropathy  Recent medication refill as patient has continued to take 100 mg Lyrica at bedtime  Relates this pretty much controls of the nerve pain in her feet  Denies side effects to Lyrica  Relates diabetes has remained pretty well-controlled, pretty sure A1c was just under 7.  Patient relates over the last year her diabetes is improved significantly with a 85-90 lb weight loss due to Jardiance and utilizing continuous monitoring device        Past Medical History:   Diagnosis Date    Anxiety and depression     Atrial fibrillation 03/31/2022    CKD (chronic kidney disease)     COPD (chronic obstructive pulmonary disease)     Diabetes mellitus     Graves disease     Hypercholesteremia     Hypertension     Hypertensive retinopathy of both eyes 08/01/2024    Joey Landis MD    Personal history of colonic polyps 01/12/2016    Husam Brambila MD    Sleep apnea      Past Surgical History:   Procedure Laterality Date    COLONOSCOPY  01/12/2016    Husam Brambila MD    COLONOSCOPY N/A 10/9/2024    Procedure: COLONOSCOPY;  Surgeon: Lena Hunter MD;  Location: [facility] ENDO;  Service: Endoscopy;  Laterality: N/A;  ppm    EPIDURAL STEROID INJECTION INTO LUMBAR SPINE N/A 9/5/2024    Procedure: Injection-steroid-epidural-lumbar;  Surgeon: Royal Garcia MD;  Location: [facility] ASU OR;  Service: Pain Management;  Laterality: N/A;    FRACTURE SURGERY      pelvis surgery    SHOULDER SURGERY Left     TUBAL LIGATION       Family History   Problem Relation Name Age of Onset    Heart disease Mother      Breast cancer Mother      Heart disease Father       Social History     Socioeconomic History    Marital status:     Number of children: 4    Highest education level: 4th grade   Occupational History     Occupation: [employer]   Tobacco Use    Smoking status: Never    Smokeless tobacco: Never   Substance and Sexual Activity    Alcohol use: Not Currently     Comment: social rare    Drug use: Never    Sexual activity: Not Currently     Partners: Male       Current Outpatient Medications   Medication Sig Dispense Refill    albuterol (PROVENTIL/VENTOLIN HFA) 90 mcg/actuation inhaler Inhale 1-2 puffs into the lungs every 6 (six) hours as needed for Wheezing or Shortness of Breath. Rescue 18 g 11    albuterol-ipratropium (DUO-NEB) 2.5 mg-0.5 mg/3 mL nebulizer solution Take 3 mLs by nebulization every 4 (four) hours as needed for Wheezing. Rescue 75 mL 11    amlodipine-olmesartan (TODD) 5-40 mg per tablet Take 1 tablet by mouth once daily. Replaces lisinopril. 90 tablet 1    aspirin (ECOTRIN) 81 MG EC tablet Take 1 tablet (81 mg total) by mouth every evening. heart 90 tablet 3    atorvastatin (LIPITOR) 40 MG tablet Take 1 tablet (40 mg total) by mouth every evening. cholesterol 90 tablet 3    betamethasone dipropionate 0.05 % cream Apply topically 2 (two) times daily as needed (itching). 45 g 5    blood sugar diagnostic Strp To check BG 3 times daily, to use with insurance preferred meter 200 strip 11    blood-glucose sensor (FREESTYLE NICO 3 PLUS SENSOR MISC) Freestyle Nico 3+ (Plus) Sensors, See Instructions, Change every 15 days, # 2 EA, 0 Refill(s), Pharmacy: [id number], 165, cm, 12/03/24 11:41:00 CST, Height/Length Measured, 79, kg, 12/03/24 11:41:00 CST, Weight Dosing      blood-glucose,,cont (FREESTYLE NICO 3 READER [facility]) Freestyle Nico 3 , See Instructions, Use with Nico 3+ sensors  Dx code E11.65, # 1 EA, 0 Refill(s), Pharmacy: [id number], 165, cm, 12/03/24 11:41:00 CST, Height/Length Measured, 79, kg, 12/03/24 11:41:00 CST, Weight Dosing      budesonide-glycopyr-formoterol (BREZTRI AEROSPHERE) 160-9-4.8 mcg/actuation HFAA Inhale 2 puffs into the lungs 2 (two) times a day.  32.1 g 3    busPIRone (BUSPAR) 15 MG tablet Take 1 tablet (15 mg total) by mouth 2 (two) times daily. Buspar 180 tablet 3    cholecalciferol, vitamin D3, 1,250 mcg (50,000 unit) capsule Take 50,000 Units by mouth every 7 days.      cyanocobalamin 1,000 mcg/mL injection Inject 1 mL (1,000 mcg total) into the skin every 30 days. 3 mL 3    diclofenac sodium (VOLTAREN) 1 % Gel Apply 2 g topically 3 (three) times daily. 100 g 2    empagliflozin (JARDIANCE) 25 mg tablet 25 mg.      EScitalopram oxalate (LEXAPRO) 20 MG tablet Take 1 tablet (20 mg total) by mouth every morning. Depression/anxiety 90 tablet 3    FREESTYLE NCIO 3 PLUS SENSOR Kasia CHANGE SENSOR EVERY 15 DAYS      ibuprofen (ADVIL,MOTRIN) 600 MG tablet Take 1 tablet (600 mg total) by mouth every 8 (eight) hours as needed for Pain. 60 tablet 2    JARDIANCE 10 mg tablet TAKE 1 TABLET BY MOUTH ONCE DAILY IN THE MORNING FOR DIABETES      lancets [facility] To check BG 3 times daily, to use with insurance preferred meter 200 each 11    LANTUS U-100 INSULIN 100 unit/mL injection Inject 50 Units into the skin every evening.      metFORMIN (GLUCOPHAGE) 1000 MG tablet Take 1 tablet (1,000 mg total) by mouth 2 (two) times daily. Diabetes 180 tablet 3    nitroGLYCERIN (NITROSTAT) 0.4 MG SL tablet Place 1 tablet (0.4 mg total) under the tongue every 5 (five) minutes as needed for Chest pain. 30 tablet 3    oxybutynin (DITROPAN-XL) 10 MG 24 hr tablet Take 1 tablet (10 mg total) by mouth once daily. 90 tablet 3    predniSONE (DELTASONE) 10 MG tablet Take one pill a day for three days, repeat for shortness of breath 18 tablet 0    pregabalin (LYRICA) 100 MG capsule TAKE 1 CAPSULE BY MOUTH IN THE EVENING 30 capsule 0    tirzepatide 2.5 mg/0.5 mL PnIj Inject 2.5 mg into the skin every 7 days.      tiZANidine (ZANAFLEX) 4 MG tablet Take 1 tablet (4 mg total) by mouth every 6 (six) hours as needed (calf pain). 30 tablet 5    traZODone (DESYREL) 50 MG tablet Take 50 mg by mouth nightly  "as needed for Insomnia.      cholecalciferol, vitamin D3, 1,250 mcg (50,000 unit) Tab Take 1 tablet by mouth once a week. (Patient not taking: Reported on 5/6/2025) 12 tablet 3     No current facility-administered medications for this visit.     Review of patient's allergies indicates:   Allergen Reactions    Iodine and iodide containing products Hives       Review of Systems   Cardiovascular:  Negative for leg swelling.   Endocrine:        DM 2 x 25 yrs   Musculoskeletal:  Negative for gait problem.   All other systems reviewed and are negative.      Objective:      Vitals:    05/06/25 0907   BP: (!) 113/56   Pulse: 66   Resp: 18   Weight: 76.2 kg (168 lb)   Height: 5' 2" (1.575 m)     Physical Exam  Vitals and nursing note reviewed.   Constitutional:       General: She is not in acute distress.     Appearance: Normal appearance.   Cardiovascular:      Pulses:           Dorsalis pedis pulses are 2+ on the right side and 2+ on the left side.        Posterior tibial pulses are 1+ on the right side and 1+ on the left side.   Musculoskeletal:      Right foot: Deformity (Prominent IPJ bilateral hallux, mild pronation of foot bilateral upon weight-bearing) present.      Left foot: Deformity present.   Feet:      Right foot:      Skin integrity: Callus (callus medial IPJ bilateral hallux) present.      Toenail Condition: Right toenails are long.      Left foot:      Skin integrity: Callus present.      Toenail Condition: Left toenails are long.   Skin:     Capillary Refill: Capillary refill takes 2 to 3 seconds.   Neurological:      General: No focal deficit present.      Mental Status: She is alert.      Comments: pain/paresthesias feet   Psychiatric:         Thought Content: Thought content normal.                      Assessment:       1. Diabetic polyneuropathy associated with diabetes mellitus due to underlying condition    2. Acquired deformity of joint of left foot    3. Pre-ulcerative calluses    4. Acquired " deformity of joint of right foot            Plan:         CONTINUE LYRICA 100 MG 1 AT BEDTIME      Advised patient weight loss 1 of the best ways to help control her diabetes an Intron this is most likely helped control the nerve pain in her feet  We reviewed diabetic neuropathy and direct correlation between daily glucose/A1c and neuropathic pain in feet  Continue Lyrica 100 mg at bedtime  We reviewed other topical treatments applied each night before bed  Reviewed care and maintenance of skin, calluses   Recommended daily application of Aquaphor  Reviewed preulcerative calluses on the sides of the big toes  Reviewed potential complications due to location  Preulcerative debrided medial IPJ bilateral hallux  No complications at this time  Discussed care and maintenance of nails, nails debrided, no complications at this time  Reviewed diabetic education pertaining to feet and potential complications to monitor for  Check feet daily and contact office with any area of concern which has not improved in a few days  Patient was in understanding and agreement with treatment plan.  I counseled the patient on their conditions, implications and medical management.  Instructed patient to contact the office with any changes, questions, concerns, worsening of symptoms.   Total face to face time 20 minutes, exam, assessment, treatment, discussion, additional time for review of chart prior to and following appointment and visit documentation, consultation and coordination of care.   Follow up 3 months    This note was created using [employer] voice recognition software that occasionally misinterpreted phrases or words.

## 2025-05-26 ENCOUNTER — TELEPHONE (OUTPATIENT)
Dept: FAMILY MEDICINE | Facility: CLINIC | Age: 70
End: 2025-05-26
Payer: MEDICARE

## 2025-05-26 DIAGNOSIS — Z12.31 BREAST CANCER SCREENING BY MAMMOGRAM: Primary | ICD-10-CM

## 2025-05-26 DIAGNOSIS — E11.59 HYPERTENSION ASSOCIATED WITH DIABETES: ICD-10-CM

## 2025-05-26 DIAGNOSIS — I15.2 HYPERTENSION ASSOCIATED WITH DIABETES: ICD-10-CM

## 2025-05-26 DIAGNOSIS — E11.69 MIXED DIABETIC HYPERLIPIDEMIA ASSOCIATED WITH TYPE 2 DIABETES MELLITUS: ICD-10-CM

## 2025-05-26 DIAGNOSIS — E55.9 VITAMIN D DEFICIENCY: ICD-10-CM

## 2025-05-26 DIAGNOSIS — E05.00 GRAVES' DISEASE IN REMISSION: ICD-10-CM

## 2025-05-26 DIAGNOSIS — E78.2 MIXED DIABETIC HYPERLIPIDEMIA ASSOCIATED WITH TYPE 2 DIABETES MELLITUS: ICD-10-CM

## 2025-05-26 DIAGNOSIS — E53.8 B12 DEFICIENCY: ICD-10-CM

## 2025-05-30 DIAGNOSIS — N32.81 OAB (OVERACTIVE BLADDER): ICD-10-CM

## 2025-05-30 RX ORDER — OXYBUTYNIN CHLORIDE 10 MG/1
10 TABLET, EXTENDED RELEASE ORAL DAILY
Qty: 90 TABLET | Refills: 3 | Status: SHIPPED | OUTPATIENT
Start: 2025-05-30

## 2025-05-30 NOTE — TELEPHONE ENCOUNTER
Refill Routing Note   Medication(s) are not appropriate for processing by [facility] for the following reason(s):        Non-participating provider    [facility] action(s):  Route             Appointments  past 12m or future 3m with PCP    Date Provider   Last Visit   10/4/2024 Africa Altamirano NP   Next Visit   9/22/2025 Africa Altamirano NP   ED visits in past 90 days: 0        Note composed:9:06 AM 05/30/2025

## 2025-05-30 NOTE — TELEPHONE ENCOUNTER
Copied from CRM #[id number]. Topic: Medications - Medication Refill  >> May 30, 2025  7:21 AM Pamella wrote:  Type:  RX Refill Request    Who Called: pts daughter     Refill or New Rx:refill    RX Name and Strength:oxybutynin (DITROPAN-XL) 10 MG 24 hr tablet    How is the patient currently taking it? (ex. 1XDay):as directed     Is this a 30 day or 90 day RX:90    Preferred Pharmacy with phone number:  [facility], [place] - [address] [address]  Phone: [phone] Fax: [phone]      Local or Mail Order:local     Ordering Provider:Karon    Would the patient rather a call back or a response via [facility]? call  Best Call Back Number:[phone]    Additional Information: pts daughter called and said she needed a refill of her bladder meds sent in

## 2025-05-31 DIAGNOSIS — G57.93 NEUROPATHIC PAIN OF BOTH FEET: ICD-10-CM

## 2025-06-02 RX ORDER — PREGABALIN 100 MG/1
100 CAPSULE ORAL
Qty: 30 CAPSULE | Refills: 0 | Status: SHIPPED | OUTPATIENT
Start: 2025-06-02

## 2025-06-16 DIAGNOSIS — M54.50 CHRONIC MIDLINE LOW BACK PAIN WITHOUT SCIATICA: ICD-10-CM

## 2025-06-16 DIAGNOSIS — F32.A ANXIETY AND DEPRESSION: ICD-10-CM

## 2025-06-16 DIAGNOSIS — I15.2 HYPERTENSION ASSOCIATED WITH DIABETES: ICD-10-CM

## 2025-06-16 DIAGNOSIS — G89.29 CHRONIC MIDLINE LOW BACK PAIN WITHOUT SCIATICA: ICD-10-CM

## 2025-06-16 DIAGNOSIS — J44.9 CHRONIC OBSTRUCTIVE PULMONARY DISEASE, UNSPECIFIED COPD TYPE: ICD-10-CM

## 2025-06-16 DIAGNOSIS — E11.59 HYPERTENSION ASSOCIATED WITH DIABETES: ICD-10-CM

## 2025-06-16 DIAGNOSIS — F41.9 ANXIETY AND DEPRESSION: ICD-10-CM

## 2025-06-16 DIAGNOSIS — G57.93 NEUROPATHIC PAIN OF BOTH FEET: ICD-10-CM

## 2025-06-16 RX ORDER — AMLODIPINE BESYLATE AND OLMESARTAN MEDOXOMIL 5; 40 MG/1; MG/1
TABLET ORAL
Refills: 0 | OUTPATIENT
Start: 2025-06-16

## 2025-06-16 RX ORDER — IBUPROFEN 600 MG/1
TABLET, FILM COATED ORAL
Refills: 0 | OUTPATIENT
Start: 2025-06-16

## 2025-06-16 RX ORDER — ESCITALOPRAM OXALATE 20 MG/1
TABLET ORAL
Refills: 0 | OUTPATIENT
Start: 2025-06-16

## 2025-06-16 RX ORDER — BUSPIRONE HYDROCHLORIDE 15 MG/1
TABLET ORAL
Refills: 0 | OUTPATIENT
Start: 2025-06-16

## 2025-06-16 RX ORDER — ATORVASTATIN CALCIUM 40 MG/1
TABLET, FILM COATED ORAL
Refills: 0 | OUTPATIENT
Start: 2025-06-16

## 2025-06-16 RX ORDER — PREGABALIN 100 MG/1
CAPSULE ORAL
Refills: 0 | OUTPATIENT
Start: 2025-06-16

## 2025-06-16 RX ORDER — INSULIN GLARGINE 100 [IU]/ML
INJECTION, SOLUTION SUBCUTANEOUS
Refills: 0 | OUTPATIENT
Start: 2025-06-16

## 2025-06-16 RX ORDER — PREDNISONE 10 MG/1
TABLET ORAL
Refills: 0 | OUTPATIENT
Start: 2025-06-16

## 2025-06-16 NOTE — TELEPHONE ENCOUNTER
Refill Decision Note   Syl Matos  is requesting a refill authorization.  Brief Assessment and Rationale for Refill:  Quick Discontinue     Medication Therapy Plan:   . Pharmacy is requesting new scripts for the following medications without required information, (sig/ frequency/qty/etc)          Comments: Pharmacies have been requesting medications for patients without required information, (sig, frequency, qty, etc.). In addition, requests are sent for medication(s) pt. are currently not taking, and medications patients have never taken.    We have spoken to the pharmacies about these request types and advised their teams previously that we are unable to assess these New Script requests and require all details for these requests. This is a known issue and has been reported.     Note composed:11:31 AM 06/16/2025

## 2025-06-30 ENCOUNTER — TELEPHONE (OUTPATIENT)
Dept: PODIATRY | Facility: CLINIC | Age: 70
End: 2025-06-30
Payer: MEDICARE

## 2025-06-30 DIAGNOSIS — G57.93 NEUROPATHIC PAIN OF BOTH FEET: ICD-10-CM

## 2025-06-30 RX ORDER — PREGABALIN 100 MG/1
100 CAPSULE ORAL NIGHTLY
Qty: 30 CAPSULE | Refills: 2 | Status: SHIPPED | OUTPATIENT
Start: 2025-06-30

## 2025-06-30 NOTE — TELEPHONE ENCOUNTER
Refill request for Lyrica sent to [facility] in [place].     Last office visit was 05/06/2025.    NESTOR Esposito 06/30/2025

## 2025-06-30 NOTE — TELEPHONE ENCOUNTER
Primary Information    Source   Syl Matos Mae (Patient)    Subject   Syl Matos (Patient)    Topic   Medications - Medication Refill      Summary   Medication refill request   Communication   Type:  RX Refill Request            Who Called: Pt      Refill or New Rx: Refill      RX Name and Strength: pregabalin (LYRICA) 100 MG capsule      How is the patient currently taking it? (ex. 1XDay): as directed      Is this a 30 day or 90 day RX:      Preferred Pharmacy with phone number:      [facility] [address] [place] [address] - [address] [place] [address]      Phone: [phone] Fax: [phone]      Local or Mail Order: Local      Ordering Provider: Dr. RAMAKRISHNA Joe      Would the patient rather a call back or a response via [facility]? Call      Best Call Back Number: [phone]      Please call to advise... Thank you...

## 2025-07-02 ENCOUNTER — TELEPHONE (OUTPATIENT)
Dept: FAMILY MEDICINE | Facility: CLINIC | Age: 70
End: 2025-07-02
Payer: MEDICARE

## 2025-07-02 NOTE — TELEPHONE ENCOUNTER
Copied from CRM #[id number]. Topic: General Inquiry - Patient Advice  >> Jul 2, 2025  3:07 PM Jessica wrote:  Type:  Needs Medical Advice    Who Called: Aspen Lomeli   Symptoms (please be specific): needs a new authorization for her oxygen supply from [employer] since she changed insurance companies   Would the patient rather a call back or a response via [facility]? call  Best Call Back Number: [phone]  Additional Information: please advise and thank you.

## 2025-07-07 ENCOUNTER — TELEPHONE (OUTPATIENT)
Dept: FAMILY MEDICINE | Facility: CLINIC | Age: 70
End: 2025-07-07
Payer: MEDICARE

## 2025-07-07 ENCOUNTER — RESULTS FOLLOW-UP (OUTPATIENT)
Dept: FAMILY MEDICINE | Facility: CLINIC | Age: 70
End: 2025-07-07

## 2025-07-07 ENCOUNTER — OFFICE VISIT (OUTPATIENT)
Dept: FAMILY MEDICINE | Facility: CLINIC | Age: 70
End: 2025-07-07
Payer: MEDICARE

## 2025-07-07 ENCOUNTER — HOSPITAL ENCOUNTER (OUTPATIENT)
Dept: RADIOLOGY | Facility: HOSPITAL | Age: 70
Discharge: HOME | End: 2025-07-07
Attending: STUDENT IN AN ORGANIZED HEALTH CARE EDUCATION/TRAINING PROGRAM
Payer: MEDICARE

## 2025-07-07 VITALS
HEART RATE: 78 BPM | SYSTOLIC BLOOD PRESSURE: 128 MMHG | DIASTOLIC BLOOD PRESSURE: 64 MMHG | BODY MASS INDEX: 30.91 KG/M2 | HEIGHT: 62 IN | WEIGHT: 168 LBS | OXYGEN SATURATION: 96 % | RESPIRATION RATE: 16 BRPM

## 2025-07-07 DIAGNOSIS — J44.1 COPD EXACERBATION: Primary | ICD-10-CM

## 2025-07-07 DIAGNOSIS — J44.1 COPD EXACERBATION: ICD-10-CM

## 2025-07-07 PROCEDURE — 99214 OFFICE O/P EST MOD 30 MIN: CPT | Mod: S$GLB,,, | Performed by: STUDENT IN AN ORGANIZED HEALTH CARE EDUCATION/TRAINING PROGRAM

## 2025-07-07 PROCEDURE — 3078F DIAST BP <80 MM HG: CPT | Mod: CPTII,S$GLB,, | Performed by: STUDENT IN AN ORGANIZED HEALTH CARE EDUCATION/TRAINING PROGRAM

## 2025-07-07 PROCEDURE — 1126F AMNT PAIN NOTED NONE PRSNT: CPT | Mod: CPTII,S$GLB,, | Performed by: STUDENT IN AN ORGANIZED HEALTH CARE EDUCATION/TRAINING PROGRAM

## 2025-07-07 PROCEDURE — 71046 X-RAY EXAM CHEST 2 VIEWS: CPT | Mod: 26,,, | Performed by: RADIOLOGY

## 2025-07-07 PROCEDURE — 1159F MED LIST DOCD IN RCRD: CPT | Mod: CPTII,S$GLB,, | Performed by: STUDENT IN AN ORGANIZED HEALTH CARE EDUCATION/TRAINING PROGRAM

## 2025-07-07 PROCEDURE — 3044F HG A1C LEVEL LT 7.0%: CPT | Mod: CPTII,S$GLB,, | Performed by: STUDENT IN AN ORGANIZED HEALTH CARE EDUCATION/TRAINING PROGRAM

## 2025-07-07 PROCEDURE — 71046 X-RAY EXAM CHEST 2 VIEWS: CPT | Mod: TC

## 2025-07-07 PROCEDURE — 1101F PT FALLS ASSESS-DOCD LE1/YR: CPT | Mod: CPTII,S$GLB,, | Performed by: STUDENT IN AN ORGANIZED HEALTH CARE EDUCATION/TRAINING PROGRAM

## 2025-07-07 PROCEDURE — 4010F ACE/ARB THERAPY RXD/TAKEN: CPT | Mod: CPTII,S$GLB,, | Performed by: STUDENT IN AN ORGANIZED HEALTH CARE EDUCATION/TRAINING PROGRAM

## 2025-07-07 PROCEDURE — 3288F FALL RISK ASSESSMENT DOCD: CPT | Mod: CPTII,S$GLB,, | Performed by: STUDENT IN AN ORGANIZED HEALTH CARE EDUCATION/TRAINING PROGRAM

## 2025-07-07 PROCEDURE — 3008F BODY MASS INDEX DOCD: CPT | Mod: CPTII,S$GLB,, | Performed by: STUDENT IN AN ORGANIZED HEALTH CARE EDUCATION/TRAINING PROGRAM

## 2025-07-07 PROCEDURE — 3074F SYST BP LT 130 MM HG: CPT | Mod: CPTII,S$GLB,, | Performed by: STUDENT IN AN ORGANIZED HEALTH CARE EDUCATION/TRAINING PROGRAM

## 2025-07-07 PROCEDURE — 99999 PR PBB SHADOW E&M-EST. PATIENT-LVL V: CPT | Mod: PBBFAC,,, | Performed by: STUDENT IN AN ORGANIZED HEALTH CARE EDUCATION/TRAINING PROGRAM

## 2025-07-07 RX ORDER — PREDNISONE 20 MG/1
20 TABLET ORAL DAILY
Qty: 4 TABLET | Refills: 0 | Status: SHIPPED | OUTPATIENT
Start: 2025-07-07

## 2025-07-07 RX ORDER — AZITHROMYCIN 250 MG/1
TABLET, FILM COATED ORAL
Qty: 6 TABLET | Refills: 0 | Status: SHIPPED | OUTPATIENT
Start: 2025-07-07 | End: 2025-07-12

## 2025-07-07 NOTE — TELEPHONE ENCOUNTER
Copied from CRM #[id number]. Topic: General Inquiry - Patient Advice  >> Jul 7, 2025  7:49 AM Lorena wrote:  Type:  Needs Medical Advice    Who Called:  pt daughter  Symptoms (please be specific): cough  How long has patient had these symptoms:  4 days  Pharmacy name and phone #:    [facility], [place] - [address]  [address]  [facility] [address]  Phone: [phone] Fax: [phone]      Would the patient rather a call back or a response via [facility]? call  Best Call Back Number: Telephone Information:  Mobile          [phone]      Additional Information:  pt was seen in urgent care and was prescribe medication but it is not working and cough is getting worst

## 2025-07-07 NOTE — TELEPHONE ENCOUNTER
Spoke with daughter. Pt seen at Urgent Care d/t cough   Prescribed mucinex, cough worsening. Appt scheduled with Dr. Hernandez.

## 2025-07-07 NOTE — PROGRESS NOTES
"SUBJECTIVE:    CHIEF COMPLAINT:   Chief Complaint   Patient presents with    Cough     Pt has been coughing for 1 week. Pt was tested at urgent care for covid flu and strep and all negative. Pt states she "coughs yellow stuff up"           274}    HISTORY OF PRESENT ILLNESS:  Syl Matos is a 69 y.o. female who presents to the clinic today   History of Present Illness    CHIEF COMPLAINT:  Ms. Matos presents today with respiratory infection    HISTORY OF PRESENT ILLNESS:  She reports wheezing and mild shortness of breath with difficulty taking deep breaths. She experiences back pain associated with coughing. She denies runny nose or fever. Recent urgent care visit showed negative COVID and flu tests. She was prescribed cough syrup with codeine for nighttime use and an unspecified capsule medication to be taken 3 times daily. Pulse ox 96%    CURRENT MEDICATIONS:  She uses Breztri inhaler 2 puffs twice daily. She used Albuterol rescue inhaler this morning with minimal effect on symptoms. She utilizes nebulizer treatments every 4 hours as recommended by provider. She used home oxygen 2 days ago.    MEDICAL HISTORY:  She has COPD and follows with a lung specialist in [place]. She is not currently on steroids. She previously used a CPAP machine but discontinued due to experiencing irregular heartbeat during sleep, which her lung specialist is aware of.      ROS:  General: -fever, -chills, -fatigue, -weight gain, -weight loss  Eyes: -vision changes, -redness, -discharge  ENT: -ear pain, -nasal congestion, -sore throat  Cardiovascular: -chest pain, -palpitations, -lower extremity edema  Respiratory: +cough, +shortness of breath, +wheezing  Gastrointestinal: -abdominal pain, -nausea, -vomiting, -diarrhea, -constipation, -blood in stool  Genitourinary: -dysuria, -hematuria, -frequency  Musculoskeletal: -joint pain, +muscle pain, +body aches, +back pain  Skin: -rash, -lesion  Neurological: -headache, -dizziness, " -numbness, -tingling  Psychiatric: -anxiety, -depression, -sleep difficulty          PAST MEDICAL HISTORY:     274}  Past Medical History:   Diagnosis Date    Anxiety and depression     Atrial fibrillation 03/31/2022    CKD (chronic kidney disease)     COPD (chronic obstructive pulmonary disease)     Diabetes mellitus     Graves disease     Hypercholesteremia     Hypertension     Hypertensive retinopathy of both eyes 08/01/2024    Joey Landis MD    Personal history of colonic polyps 01/12/2016    Husam Brambila MD    Sleep apnea        PAST SURGICAL HISTORY:  Past Surgical History:   Procedure Laterality Date    COLONOSCOPY  01/12/2016    Husam Brambila MD    COLONOSCOPY N/A 10/9/2024    Procedure: COLONOSCOPY;  Surgeon: Lena Hunter MD;  Location: [facility] ENDO;  Service: Endoscopy;  Laterality: N/A;  ppm    EPIDURAL STEROID INJECTION INTO LUMBAR SPINE N/A 9/5/2024    Procedure: Injection-steroid-epidural-lumbar;  Surgeon: Royal Garcia MD;  Location: [facility] OR;  Service: Pain Management;  Laterality: N/A;    FRACTURE SURGERY      pelvis surgery    SHOULDER SURGERY Left     TUBAL LIGATION         SOCIAL HISTORY:  Social History[1]    FAMILY HISTORY:       Family History   Problem Relation Name Age of Onset    Heart disease Mother      Breast cancer Mother      Heart disease Father         ALLERGIES AND MEDICATIONS: updated and reviewed.      274}  Review of patient's allergies indicates:   Allergen Reactions    Iodine and iodide containing products Hives     Medication List with Changes/Refills   New Medications    AZITHROMYCIN (Z-RIP) 250 MG TABLET    Take 2 tablets by mouth on day 1; Take 1 tablet by mouth on days 2-5    PREDNISONE (DELTASONE) 20 MG TABLET    Take 1 tablet (20 mg total) by mouth once daily.   Current Medications    ALBUTEROL (PROVENTIL/VENTOLIN HFA) 90 MCG/ACTUATION INHALER    Inhale 1-2 puffs into the lungs every 6 (six) hours as needed for Wheezing or Shortness of Breath. Rescue     ALBUTEROL-IPRATROPIUM (DUO-NEB) 2.5 MG-0.5 MG/3 ML NEBULIZER SOLUTION    Take 3 mLs by nebulization every 4 (four) hours as needed for Wheezing. Rescue    AMLODIPINE-OLMESARTAN (TODD) 5-40 MG PER TABLET    TAKE 1 TABLET BY MOUTH ONCE DAILY **REPLACES  LISINOPRIL**    ASPIRIN (ECOTRIN) 81 MG EC TABLET    Take 1 tablet (81 mg total) by mouth every evening. heart    ATORVASTATIN (LIPITOR) 40 MG TABLET    Take 1 tablet (40 mg total) by mouth every evening. cholesterol    BETAMETHASONE DIPROPIONATE 0.05 % CREAM    Apply topically 2 (two) times daily as needed (itching).    BLOOD SUGAR DIAGNOSTIC STRP    To check BG 3 times daily, to use with insurance preferred meter    BLOOD-GLUCOSE SENSOR (FREESTYLE NICO 3 PLUS SENSOR [facility]    Freestyle Nico 3+ (Plus) Sensors, See Instructions, Change every 15 days, # 2 EA, 0 Refill(s), Pharmacy: [id number], 165, cm, 12/03/24 11:41:00 CST, Height/Length Measured, 79, kg, 12/03/24 11:41:00 CST, Weight Dosing    BLOOD-GLUCOSE,,CONT (FREESTYLE NICO 3 READER [facility]    Freestyle Nico 3 , See Instructions, Use with Nico 3+ sensors  Dx code E11.65, # 1 EA, 0 Refill(s), Pharmacy: [id number], 165, cm, 12/03/24 11:41:00 CST, Height/Length Measured, 79, kg, 12/03/24 11:41:00 CST, Weight Dosing    BUDESONIDE-GLYCOPYR-FORMOTEROL (BREZTRI AEROSPHERE) 160-9-4.8 MCG/ACTUATION HFAA    Inhale 2 puffs into the lungs 2 (two) times a day.    BUSPIRONE (BUSPAR) 15 MG TABLET    Take 1 tablet (15 mg total) by mouth 2 (two) times daily. Buspar    CHOLECALCIFEROL, VITAMIN D3, 1,250 MCG (50,000 UNIT) CAPSULE    Take 50,000 Units by mouth every 7 days.    CYANOCOBALAMIN 1,000 MCG/ML INJECTION    Inject 1 mL (1,000 mcg total) into the skin every 30 days.    DICLOFENAC SODIUM (VOLTAREN) 1 % GEL    Apply 2 g topically 3 (three) times daily.    EMPAGLIFLOZIN (JARDIANCE) 25 MG TABLET    25 mg.    ESCITALOPRAM OXALATE (LEXAPRO) 20 MG TABLET    Take 1 tablet (20 mg total) by  mouth every morning. Depression/anxiety    FREESTYLE NICO 3 PLUS SENSOR KIARA    CHANGE SENSOR EVERY 15 DAYS    IBUPROFEN (ADVIL,MOTRIN) 600 MG TABLET    Take 1 tablet (600 mg total) by mouth every 8 (eight) hours as needed for Pain.    JARDIANCE 10 MG TABLET    TAKE 1 TABLET BY MOUTH ONCE DAILY IN THE MORNING FOR DIABETES    LANCETS MISC    To check BG 3 times daily, to use with insurance preferred meter    LANTUS U-100 INSULIN 100 UNIT/ML INJECTION    Inject 50 Units into the skin every evening.    METFORMIN (GLUCOPHAGE) 1000 MG TABLET    Take 1 tablet (1,000 mg total) by mouth 2 (two) times daily. Diabetes    NITROGLYCERIN (NITROSTAT) 0.4 MG SL TABLET    Place 1 tablet (0.4 mg total) under the tongue every 5 (five) minutes as needed for Chest pain.    OXYBUTYNIN (DITROPAN-XL) 10 MG 24 HR TABLET    Take 1 tablet (10 mg total) by mouth once daily.    PREGABALIN (LYRICA) 100 MG CAPSULE    Take 1 capsule (100 mg total) by mouth every evening.    TIRZEPATIDE 2.5 MG/0.5 ML PNIJ    Inject 2.5 mg into the skin every 7 days.    TIZANIDINE (ZANAFLEX) 4 MG TABLET    Take 1 tablet (4 mg total) by mouth every 6 (six) hours as needed (calf pain).    TRAZODONE (DESYREL) 50 MG TABLET    Take 50 mg by mouth nightly as needed for Insomnia.   Discontinued Medications    PREDNISONE (DELTASONE) 10 MG TABLET    Take one pill a day for three days, repeat for shortness of breath       SCREENING HISTORY:    274}  Health Maintenance         Date Due Completion Date    TETANUS VACCINE Never done ---    RSV Vaccine (Age 60+ and Pregnant patients) (1 - Risk 60-74 years 1-dose series) Never done ---    Diabetes Urine Screening 06/17/2025 6/17/2024    Lipid Panel 06/17/2025 6/17/2024    Mammogram 07/24/2025 7/24/2024    Override on 1/9/2020: Done    Diabetic Eye Exam 08/01/2025 8/1/2024    Hemoglobin A1c 08/21/2025 2/21/2025    Influenza Vaccine (1) 09/01/2025 12/18/2024    Foot Exam 02/21/2026 2/21/2025    Low Dose Statin 05/06/2026 5/6/2025  "   DEXA Scan 07/24/2029 7/24/2024    Colorectal Cancer Screening 10/09/2029 10/9/2024            REVIEW OF SYSTEMS:   ROS    PHYSICAL EXAM:      274}  /64 (BP Location: Left arm, Patient Position: Sitting)   Pulse 78   Resp 16   Ht 5' 2.01" (1.575 m)   Wt 76.2 kg (168 lb)   SpO2 96%   BMI 30.72 kg/m²   Wt Readings from Last 3 Encounters:   07/07/25 76.2 kg (168 lb)   05/06/25 76.2 kg (168 lb)   02/26/25 77.1 kg (170 lb)     BP Readings from Last 3 Encounters:   07/07/25 128/64   05/06/25 (!) 113/56   02/26/25 133/63     Estimated body mass index is 30.72 kg/m² as calculated from the following:    Height as of this encounter: 5' 2.01" (1.575 m).    Weight as of this encounter: 76.2 kg (168 lb).     Physical Exam  HENT:      Head: Normocephalic and atraumatic.      Mouth/Throat:      Mouth: Mucous membranes are dry.      Pharynx: Oropharynx is clear.   Eyes:      Extraocular Movements: Extraocular movements intact.      Conjunctiva/sclera: Conjunctivae normal.   Cardiovascular:      Rate and Rhythm: Normal rate and regular rhythm.   Pulmonary:      Effort: Pulmonary effort is normal.      Breath sounds: Wheezing present.   Abdominal:      General: Bowel sounds are normal.      Palpations: Abdomen is soft.   Musculoskeletal:         General: Normal range of motion.      Cervical back: Normal range of motion.   Skin:     General: Skin is warm.   Neurological:      General: No focal deficit present.      Mental Status: She is alert. Mental status is at baseline.   Psychiatric:         Mood and Affect: Mood normal.         Thought Content: Thought content normal.         LABS:   274}  I have reviewed old labs below:  Lab Results   Component Value Date    WBC 10.74 10/03/2024    HGB 12.4 10/03/2024    HCT 38.2 10/03/2024    MCV 95 10/03/2024     10/03/2024     10/03/2024    K 4.6 10/03/2024     10/03/2024    CALCIUM 9.5 10/03/2024    CO2 23 10/03/2024     (H) 10/03/2024    BUN 24 (H) " 10/03/2024    CREATININE 1.3 10/03/2024    ANIONGAP 12 10/03/2024    ESTGFRAFRICA >60.0 07/08/2022    EGFRNONAA >60.0 07/08/2022    PROT 7.5 10/03/2024    ALBUMIN 3.8 10/03/2024    BILITOT 0.7 10/03/2024    ALKPHOS 58 10/03/2024    ALT 17 10/03/2024    AST 14 10/03/2024    INR 1.0 07/08/2022    CHOL 115 (L) 06/17/2024    TRIG 143 06/17/2024    HDL 43 06/17/2024    LDLCALC 43.4 (L) 06/17/2024    TSH 1.980 06/17/2024    HGBA1C 6.9 (H) 02/21/2025       ASSESSMENT AND PLAN:  274}  Assessment & Plan    IMPRESSION:  Assessed respiratory infection and COPD exacerbation.    CHRONIC RESPIRATORY FAILURE WITH HYPOXIA:  - Ms. Matos reports feeling dyspneic and wheezing, with current SpO2 of 96%.  - Noted patient uses home oxygen, last used 2 days ago.  - Instructed patient to use nebulizer treatment upon arriving home and continue nebulizer solution every 4 hours as needed for symptom management.  - Ordered chest radiograph and prescribed antibiotics and steroids.  - Will consider attempting to move upcoming appointment with pulmonologist earlier.  - Advised patient to call lung specialist to inform about current symptoms and request earlier appointment if possible.  - Ms. Matos should contact our office to update on symptoms, nebulizer usage, and chest radiograph results.    CHRONIC OBSTRUCTIVE PULMONARY DISEASE, UNSPECIFIED COPD TYPE:  - Evaluated patient who reports wheezing and dyspnea, with nebulizer providing symptomatic relief.  - Initiated steroids to address pulmonary inflammation and ordered chest radiograph to evaluate respiratory status.  - Medication regimen includes: continued Breztri (2 puffs twice daily), rescue inhaler (Albuterol) as needed, nebulizer solution every 4 hours as needed for cough and wheezing, and continued use of antitussive medication and cough syrup.  - Advised patient to inform pulmonologist about current symptoms and nebulizer usage at upcoming appointment.    LOW BACK PAIN:  - Noted the  patient reports myalgia in the back, associated with coughing and pyrexia.    DEPENDENCE ON SUPPLEMENTAL OXYGEN:  - Suggested that the pulmonologist can assist with insurance coverage for the oxygen machine.        1. COPD exacerbation  - X-Ray Chest PA And Lateral; Future  - azithromycin (Z-RIP) 250 MG tablet; Take 2 tablets by mouth on day 1; Take 1 tablet by mouth on days 2-5  Dispense: 6 tablet; Refill: 0  - predniSONE (DELTASONE) 20 MG tablet; Take 1 tablet (20 mg total) by mouth once daily.  Dispense: 4 tablet; Refill: 0         Orders Placed This Encounter   Procedures    X-Ray Chest PA And Lateral       No follow-ups on file. or sooner as needed.    This note was generated with the assistance of ambient listening technology. Verbal consent was obtained by the patient and accompanying visitor(s) for the recording of patient appointment to facilitate this note. I attest to having reviewed and edited the generated note for accuracy, though some syntax or spelling errors may persist. Please contact the author of this note for any clarification.            [1]   Social History  Socioeconomic History    Marital status:     Number of children: 4    Highest education level: 4th grade   Occupational History    Occupation: [employer]   Tobacco Use    Smoking status: Never    Smokeless tobacco: Never   Substance and Sexual Activity    Alcohol use: Not Currently     Comment: social rare    Drug use: Never    Sexual activity: Not Currently     Partners: Male

## 2025-07-24 ENCOUNTER — HOSPITAL ENCOUNTER (OUTPATIENT)
Dept: RADIOLOGY | Facility: HOSPITAL | Age: 70
Discharge: HOME | End: 2025-07-24
Attending: NURSE PRACTITIONER
Payer: MEDICARE

## 2025-07-24 DIAGNOSIS — Z12.31 BREAST CANCER SCREENING BY MAMMOGRAM: ICD-10-CM

## 2025-07-24 DIAGNOSIS — E53.8 B12 DEFICIENCY: ICD-10-CM

## 2025-07-24 DIAGNOSIS — E05.00 GRAVES' DISEASE IN REMISSION: ICD-10-CM

## 2025-07-24 DIAGNOSIS — I15.2 HYPERTENSION ASSOCIATED WITH DIABETES: ICD-10-CM

## 2025-07-24 DIAGNOSIS — E11.69 MIXED DIABETIC HYPERLIPIDEMIA ASSOCIATED WITH TYPE 2 DIABETES MELLITUS: ICD-10-CM

## 2025-07-24 DIAGNOSIS — E11.59 HYPERTENSION ASSOCIATED WITH DIABETES: ICD-10-CM

## 2025-07-24 DIAGNOSIS — E55.9 VITAMIN D DEFICIENCY: ICD-10-CM

## 2025-07-24 DIAGNOSIS — E78.2 MIXED DIABETIC HYPERLIPIDEMIA ASSOCIATED WITH TYPE 2 DIABETES MELLITUS: ICD-10-CM

## 2025-07-24 PROCEDURE — 77063 BREAST TOMOSYNTHESIS BI: CPT | Mod: 26,,, | Performed by: RADIOLOGY

## 2025-07-24 PROCEDURE — 77067 SCR MAMMO BI INCL CAD: CPT | Mod: 26,,, | Performed by: RADIOLOGY

## 2025-07-24 PROCEDURE — 77067 SCR MAMMO BI INCL CAD: CPT | Mod: TC

## 2025-07-25 ENCOUNTER — TELEPHONE (OUTPATIENT)
Dept: FAMILY MEDICINE | Facility: CLINIC | Age: 70
End: 2025-07-25
Payer: MEDICARE

## 2025-07-25 DIAGNOSIS — N32.81 OAB (OVERACTIVE BLADDER): ICD-10-CM

## 2025-07-25 DIAGNOSIS — M79.604 RIGHT LEG PAIN: ICD-10-CM

## 2025-07-25 DIAGNOSIS — I15.2 HYPERTENSION ASSOCIATED WITH DIABETES: ICD-10-CM

## 2025-07-25 DIAGNOSIS — E11.9 TYPE 2 DIABETES MELLITUS WITHOUT COMPLICATION, WITH LONG-TERM CURRENT USE OF INSULIN: ICD-10-CM

## 2025-07-25 DIAGNOSIS — E11.59 HYPERTENSION ASSOCIATED WITH DIABETES: ICD-10-CM

## 2025-07-25 DIAGNOSIS — F41.9 ANXIETY AND DEPRESSION: ICD-10-CM

## 2025-07-25 DIAGNOSIS — F32.A ANXIETY AND DEPRESSION: ICD-10-CM

## 2025-07-25 DIAGNOSIS — Z79.4 TYPE 2 DIABETES MELLITUS WITHOUT COMPLICATION, WITH LONG-TERM CURRENT USE OF INSULIN: ICD-10-CM

## 2025-07-25 NOTE — TELEPHONE ENCOUNTER
Copied from CRM #[id number]. Topic: Medications - Medication Refill  >> Jul 25, 2025  9:13 AM Royal wrote:  Type:  RX Refill Request    Who Called: pt     Refill or New Rx:refill     RX Name and Strength: Disp Refills Start End   tirzepatide 2.5 mg/0.5 mL PnIj - -  -   Sig - Route: Inject 2.5 mg into the skin every 7 days. - Subcutaneous   Class: Historical Med     How is the patient currently taking it? (ex. 1XDay):see above    Is this a 30 day or 90 day RX:see above    Preferred Pharmacy with phone number:  [facility], [place] - [address] [address]  [address] [place] [address]  Phone: [phone] Fax: [phone]    Local or Mail Order:local     Ordering Provider:doctor    Would the patient rather a call back or a response via [facility]? Call     Best Call Back Number:[phone] (pt )    Additional Information: pt is requesting an increase in dosage for rx listed above

## 2025-07-30 DIAGNOSIS — E53.8 B12 DEFICIENCY: ICD-10-CM

## 2025-07-30 RX ORDER — ATORVASTATIN CALCIUM 40 MG/1
40 TABLET, FILM COATED ORAL NIGHTLY
Qty: 90 TABLET | Refills: 3 | Status: SHIPPED | OUTPATIENT
Start: 2025-07-30

## 2025-07-30 RX ORDER — BUSPIRONE HYDROCHLORIDE 15 MG/1
15 TABLET ORAL 2 TIMES DAILY
Qty: 180 TABLET | Refills: 3 | Status: SHIPPED | OUTPATIENT
Start: 2025-07-30

## 2025-07-30 RX ORDER — TRAZODONE HYDROCHLORIDE 50 MG/1
50 TABLET ORAL NIGHTLY PRN
Qty: 90 TABLET | Refills: 3 | Status: SHIPPED | OUTPATIENT
Start: 2025-07-30

## 2025-07-30 RX ORDER — INSULIN GLARGINE 100 [IU]/ML
50 INJECTION, SOLUTION SUBCUTANEOUS NIGHTLY
Qty: 15 EACH | Refills: 3 | Status: SHIPPED | OUTPATIENT
Start: 2025-07-30

## 2025-07-30 RX ORDER — ESCITALOPRAM OXALATE 20 MG/1
20 TABLET ORAL EVERY MORNING
Qty: 90 TABLET | Refills: 3 | Status: SHIPPED | OUTPATIENT
Start: 2025-07-30

## 2025-07-30 RX ORDER — METFORMIN HYDROCHLORIDE 1000 MG/1
1000 TABLET ORAL 2 TIMES DAILY
Qty: 180 TABLET | Refills: 3 | Status: SHIPPED | OUTPATIENT
Start: 2025-07-30

## 2025-07-30 RX ORDER — TIRZEPATIDE 5 MG/.5ML
5 INJECTION, SOLUTION SUBCUTANEOUS
Qty: 4 PEN | Refills: 0 | Status: SHIPPED | OUTPATIENT
Start: 2025-07-30

## 2025-07-30 RX ORDER — TIZANIDINE 4 MG/1
4 TABLET ORAL EVERY 6 HOURS PRN
Qty: 30 TABLET | Refills: 5 | Status: SHIPPED | OUTPATIENT
Start: 2025-07-30

## 2025-07-30 RX ORDER — OXYBUTYNIN CHLORIDE 10 MG/1
10 TABLET, EXTENDED RELEASE ORAL DAILY
Qty: 90 TABLET | Refills: 3 | Status: SHIPPED | OUTPATIENT
Start: 2025-07-30

## 2025-08-02 RX ORDER — CYANOCOBALAMIN 1000 UG/ML
INJECTION, SOLUTION INTRAMUSCULAR; SUBCUTANEOUS
Qty: 3 ML | Refills: 11 | Status: SHIPPED | OUTPATIENT
Start: 2025-08-02

## 2025-08-05 ENCOUNTER — TELEPHONE (OUTPATIENT)
Dept: PODIATRY | Facility: CLINIC | Age: 70
End: 2025-08-05
Payer: MEDICARE

## 2025-08-05 ENCOUNTER — TELEPHONE (OUTPATIENT)
Dept: FAMILY MEDICINE | Facility: CLINIC | Age: 70
End: 2025-08-05
Payer: MEDICARE

## 2025-08-05 DIAGNOSIS — G57.93 NEUROPATHIC PAIN OF BOTH FEET: ICD-10-CM

## 2025-08-05 DIAGNOSIS — I15.2 HYPERTENSION ASSOCIATED WITH DIABETES: Primary | ICD-10-CM

## 2025-08-05 DIAGNOSIS — E11.59 HYPERTENSION ASSOCIATED WITH DIABETES: Primary | ICD-10-CM

## 2025-08-05 RX ORDER — PREGABALIN 100 MG/1
100 CAPSULE ORAL NIGHTLY
Qty: 90 CAPSULE | Refills: 0 | Status: SHIPPED | OUTPATIENT
Start: 2025-08-05 | End: 2025-11-03

## 2025-08-07 ENCOUNTER — OFFICE VISIT (OUTPATIENT)
Dept: PODIATRY | Facility: CLINIC | Age: 70
End: 2025-08-07
Payer: MEDICARE

## 2025-08-07 VITALS
HEIGHT: 62 IN | DIASTOLIC BLOOD PRESSURE: 59 MMHG | BODY MASS INDEX: 31.17 KG/M2 | HEART RATE: 73 BPM | SYSTOLIC BLOOD PRESSURE: 103 MMHG | WEIGHT: 169.38 LBS | RESPIRATION RATE: 18 BRPM

## 2025-08-07 DIAGNOSIS — M21.962 ACQUIRED DEFORMITY OF JOINT OF LEFT FOOT: Primary | ICD-10-CM

## 2025-08-07 DIAGNOSIS — L84 PRE-ULCERATIVE CALLUSES: ICD-10-CM

## 2025-08-07 DIAGNOSIS — M21.961 ACQUIRED DEFORMITY OF JOINT OF RIGHT FOOT: ICD-10-CM

## 2025-08-07 DIAGNOSIS — E08.42 DIABETIC POLYNEUROPATHY ASSOCIATED WITH DIABETES MELLITUS DUE TO UNDERLYING CONDITION: ICD-10-CM

## 2025-08-07 PROCEDURE — 1159F MED LIST DOCD IN RCRD: CPT | Mod: CPTII,S$GLB,, | Performed by: PODIATRIST

## 2025-08-07 PROCEDURE — 3061F NEG MICROALBUMINURIA REV: CPT | Mod: CPTII,S$GLB,, | Performed by: PODIATRIST

## 2025-08-07 PROCEDURE — 1126F AMNT PAIN NOTED NONE PRSNT: CPT | Mod: CPTII,S$GLB,, | Performed by: PODIATRIST

## 2025-08-07 PROCEDURE — 3074F SYST BP LT 130 MM HG: CPT | Mod: CPTII,S$GLB,, | Performed by: PODIATRIST

## 2025-08-07 PROCEDURE — 3066F NEPHROPATHY DOC TX: CPT | Mod: CPTII,S$GLB,, | Performed by: PODIATRIST

## 2025-08-07 PROCEDURE — 1101F PT FALLS ASSESS-DOCD LE1/YR: CPT | Mod: CPTII,S$GLB,, | Performed by: PODIATRIST

## 2025-08-07 PROCEDURE — 3288F FALL RISK ASSESSMENT DOCD: CPT | Mod: CPTII,S$GLB,, | Performed by: PODIATRIST

## 2025-08-07 PROCEDURE — 99999 PR PBB SHADOW E&M-EST. PATIENT-LVL V: CPT | Mod: PBBFAC,,, | Performed by: PODIATRIST

## 2025-08-07 PROCEDURE — 4010F ACE/ARB THERAPY RXD/TAKEN: CPT | Mod: CPTII,S$GLB,, | Performed by: PODIATRIST

## 2025-08-07 PROCEDURE — 3051F HG A1C>EQUAL 7.0%<8.0%: CPT | Mod: CPTII,S$GLB,, | Performed by: PODIATRIST

## 2025-08-07 PROCEDURE — 3078F DIAST BP <80 MM HG: CPT | Mod: CPTII,S$GLB,, | Performed by: PODIATRIST

## 2025-08-07 PROCEDURE — 99213 OFFICE O/P EST LOW 20 MIN: CPT | Mod: S$GLB,,, | Performed by: PODIATRIST

## 2025-08-07 PROCEDURE — 3008F BODY MASS INDEX DOCD: CPT | Mod: CPTII,S$GLB,, | Performed by: PODIATRIST

## 2025-08-07 RX ORDER — BENZONATATE 200 MG/1
200 CAPSULE ORAL
COMMUNITY
Start: 2025-07-03

## 2025-08-07 RX ORDER — TIRZEPATIDE 2.5 MG/.5ML
INJECTION, SOLUTION SUBCUTANEOUS
COMMUNITY
Start: 2025-07-25

## 2025-08-08 ENCOUNTER — TELEPHONE (OUTPATIENT)
Dept: FAMILY MEDICINE | Facility: CLINIC | Age: 70
End: 2025-08-08
Payer: MEDICARE

## 2025-08-09 NOTE — PROGRESS NOTES
Subjective:       Patient ID: Syl Matos is a 70 y.o. female.    Chief Complaint: Follow-up, Callouses, Nail Problem, and Diabetes Mellitus  Patient presents for follow-up pulling neuropathy secondary to type 2 diabetes deformity of toe with preulcerative  Patient relates these areas are starting to become sore in shoes, denies redness swelling or discoloration  Confirms wearing good tennis shoes or shoes with soft fabric over the area  Confirms receiving recent medication refill, taking 100 mg Lyrica at bedtime  Patient relates as long as she takes a Lyrica at night she really does not have any burning or tingling in feet  Reports no change in diabetes with A1c 7.0 range      Past Medical History:   Diagnosis Date    Anxiety and depression     Atrial fibrillation 03/31/2022    CKD (chronic kidney disease)     COPD (chronic obstructive pulmonary disease)     Diabetes mellitus     Graves disease     Hypercholesteremia     Hypertension     Hypertensive retinopathy of both eyes 08/01/2024    Joey Landis MD    Personal history of colonic polyps 01/12/2016    Husam Brambila MD    Sleep apnea      Past Surgical History:   Procedure Laterality Date    COLONOSCOPY  01/12/2016    Husam Brambila MD    COLONOSCOPY N/A 10/9/2024    Procedure: COLONOSCOPY;  Surgeon: Lena Hunter MD;  Location: [facility] ENDO;  Service: Endoscopy;  Laterality: N/A;  ppm    EPIDURAL STEROID INJECTION INTO LUMBAR SPINE N/A 9/5/2024    Procedure: Injection-steroid-epidural-lumbar;  Surgeon: Royal Garcia MD;  Location: [facility] OR;  Service: Pain Management;  Laterality: N/A;    FRACTURE SURGERY      pelvis surgery    SHOULDER SURGERY Left     TUBAL LIGATION       Family History   Problem Relation Name Age of Onset    Heart disease Mother      Breast cancer Mother      Heart disease Father      Breast cancer Sister       Social History     Socioeconomic History    Marital status:     Number of children: 4    Highest education level: 4th  grade   Occupational History    Occupation: [employer]   Tobacco Use    Smoking status: Never    Smokeless tobacco: Never   Substance and Sexual Activity    Alcohol use: Not Currently     Comment: social rare    Drug use: Never    Sexual activity: Not Currently     Partners: Male       Current Outpatient Medications   Medication Sig Dispense Refill    albuterol (PROVENTIL/VENTOLIN HFA) 90 mcg/actuation inhaler Inhale 1-2 puffs into the lungs every 6 (six) hours as needed for Wheezing or Shortness of Breath. Rescue 18 g 11    albuterol-ipratropium (DUO-NEB) 2.5 mg-0.5 mg/3 mL nebulizer solution Take 3 mLs by nebulization every 4 (four) hours as needed for Wheezing. Rescue 75 mL 11    amlodipine-olmesartan (TODD) 5-40 mg per tablet TAKE 1 TABLET BY MOUTH ONCE DAILY **REPLACES  LISINOPRIL** 90 tablet 1    aspirin (ECOTRIN) 81 MG EC tablet Take 1 tablet (81 mg total) by mouth every evening. heart 90 tablet 3    atorvastatin (LIPITOR) 40 MG tablet Take 1 tablet (40 mg total) by mouth every evening. cholesterol 90 tablet 3    benzonatate (TESSALON) 200 MG capsule Take 200 mg by mouth.      betamethasone dipropionate 0.05 % cream Apply topically 2 (two) times daily as needed (itching). 45 g 5    blood sugar diagnostic Strp To check BG 3 times daily, to use with insurance preferred meter 200 strip 11    blood-glucose sensor (FREESTYLE NICO 3 PLUS SENSOR MISC) Freestyle Nico 3+ (Plus) Sensors, See Instructions, Change every 15 days, # 2 EA, 0 Refill(s), Pharmacy: [id number], 165, cm, 12/03/24 11:41:00 CST, Height/Length Measured, 79, kg, 12/03/24 11:41:00 CST, Weight Dosing      blood-glucose,,cont (FREESTYLE NICO 3 READER [facility]) Freestyle Nico 3 , See Instructions, Use with Nico 3+ sensors  Dx code E11.65, # 1 EA, 0 Refill(s), Pharmacy: [id number], 165, cm, 12/03/24 11:41:00 CST, Height/Length Measured, 79, kg, 12/03/24 11:41:00 CST, Weight Dosing      budesonide-glycopyr-formoterol  (BREZTRI AEROSPHERE) 160-9-4.8 mcg/actuation HFAA Inhale 2 puffs into the lungs 2 (two) times a day. 32.1 g 3    busPIRone (BUSPAR) 15 MG tablet Take 1 tablet (15 mg total) by mouth 2 (two) times daily. Buspar 180 tablet 3    cholecalciferol, vitamin D3, 1,250 mcg (50,000 unit) capsule Take 50,000 Units by mouth every 7 days.      cyanocobalamin 1,000 mcg/mL injection INJECT 1 ML SUBCUTANEOUSLY ONCE EVERY 30 DAYS 3 mL 11    diclofenac sodium (VOLTAREN) 1 % Gel Apply 2 g topically 3 (three) times daily. 100 g 2    empagliflozin (JARDIANCE) 25 mg tablet 25 mg.      EScitalopram oxalate (LEXAPRO) 20 MG tablet Take 1 tablet (20 mg total) by mouth every morning. Depression/anxiety 90 tablet 3    FREESTYLE NICO 3 PLUS SENSOR Kasia CHANGE SENSOR EVERY 15 DAYS      ibuprofen (ADVIL,MOTRIN) 600 MG tablet Take 1 tablet (600 mg total) by mouth every 8 (eight) hours as needed for Pain. 60 tablet 2    insulin glargine U-100, Lantus, (LANTUS U-100 INSULIN) 100 unit/mL injection Inject 50 Units into the skin every evening. 15 each 3    JARDIANCE 10 mg tablet TAKE 1 TABLET BY MOUTH ONCE DAILY IN THE MORNING FOR DIABETES      lancets [facility] To check BG 3 times daily, to use with insurance preferred meter 200 each 11    metFORMIN (GLUCOPHAGE) 1000 MG tablet Take 1 tablet (1,000 mg total) by mouth 2 (two) times daily. Diabetes 180 tablet 3    oxybutynin (DITROPAN-XL) 10 MG 24 hr tablet Take 1 tablet (10 mg total) by mouth once daily. 90 tablet 3    predniSONE (DELTASONE) 20 MG tablet Take 1 tablet (20 mg total) by mouth once daily. 4 tablet 0    pregabalin (LYRICA) 100 MG capsule Take 1 capsule (100 mg total) by mouth every evening. 90 capsule 0    tirzepatide (MOUNJARO) 5 mg/0.5 mL PnIj Inject 5 mg into the skin every 7 days. 4 Pen 0    tiZANidine (ZANAFLEX) 4 MG tablet Take 1 tablet (4 mg total) by mouth every 6 (six) hours as needed (calf pain). 30 tablet 5    traZODone (DESYREL) 50 MG tablet Take 1 tablet (50 mg total) by mouth  "nightly as needed for Insomnia. 90 tablet 3    MOUNJARO 2.5 mg/0.5 mL PnIj  (Patient not taking: Reported on 8/7/2025)      nitroGLYCERIN (NITROSTAT) 0.4 MG SL tablet Place 1 tablet (0.4 mg total) under the tongue every 5 (five) minutes as needed for Chest pain. (Patient not taking: Reported on 8/7/2025) 30 tablet 3     No current facility-administered medications for this visit.     Review of patient's allergies indicates:   Allergen Reactions    Iodine and iodide containing products Hives       Review of Systems   Endocrine:        DM 2 x 25 yrs   Musculoskeletal:  Negative for gait problem.   All other systems reviewed and are negative.      Objective:      Vitals:    08/07/25 0911   BP: (!) 103/59   Patient Position: Sitting   Pulse: 73   Resp: 18   Weight: 76.8 kg (169 lb 6.4 oz)   Height: 5' 2.01" (1.575 m)     Physical Exam  Vitals and nursing note reviewed.   Cardiovascular:      Pulses:           Dorsalis pedis pulses are 2+ on the right side and 2+ on the left side.        Posterior tibial pulses are 1+ on the right side and 1+ on the left side.   Musculoskeletal:      Right foot: Deformity (Prominent IPJ bilateral hallux, mild pronation of foot bilateral upon weight-bearing) present.      Left foot: Deformity present.   Feet:      Right foot:      Skin integrity: Callus (Raised chronic preulcerative callus medial IPJ bilateral hallux) present.      Toenail Condition: Right toenails are long.      Left foot:      Skin integrity: Callus present.      Toenail Condition: Left toenails are long.   Skin:     Capillary Refill: Capillary refill takes 2 to 3 seconds.   Neurological:      General: No focal deficit present.      Comments: pain/paresthesias feet                       Hemoglobin A1C       Component Ref Range & Units 2 wk ago  (7/24/25)   Hemoglobin A1c 4.0 - 5.6 % 7.2 High       Estimated Average Glucose 68 - 131 mg/dL 160 High            Assessment:       1. Acquired deformity of joint of left foot  "   2. Pre-ulcerative calluses    3. Diabetic polyneuropathy associated with diabetes mellitus due to underlying condition    4. Acquired deformity of joint of right foot            Plan:         CONTINUE LYRICA 100 MG 1 AT BEDTIME      Reviewed chronic diabetic neuropathy, signs and symptoms to monitor for especially with any progression of this condition  Advised patient the best way to prevent progression is to keep A1c under 7 and wear appropriate shoes at all times even indoors  Continue Lyrica 100 mg at bedtime  Reviewed commonly used topical treatments for neuropathy  Reviewed care and maintenance of skin, calluses, advised patient these areas are at risk for bruising, skin breakdown   Instructed to start daily application of Aquaphor  Reviewed potential complications due to location  Preulcerative debrided medial IPJ bilateral hallux  No complications at this time  Discussed care and maintenance of nails, nails debrided, no complications at this time  Reviewed diabetic education pertaining to feet and potential complications to monitor for  Check feet daily and contact office with any area of concern which has not improved in a few days  Patient was in understanding and agreement with treatment plan.  I counseled the patient on their conditions, implications and medical management.  Instructed patient to contact the office with any changes, questions, concerns, worsening of symptoms.   Total face to face time 20 minutes, exam, assessment, treatment, discussion, additional time for review of chart prior to and following appointment and visit documentation, consultation and coordination of care.   Follow up 3 months    This note was created using M*Modal voice recognition software that occasionally misinterpreted phrases or words.

## 2025-08-10 RX ORDER — INSULIN GLARGINE 100 [IU]/ML
50 INJECTION, SOLUTION SUBCUTANEOUS NIGHTLY
Qty: 15 EACH | Refills: 3 | Status: SHIPPED | OUTPATIENT
Start: 2025-08-10 | End: 2026-08-10

## 2025-08-11 ENCOUNTER — PATIENT MESSAGE (OUTPATIENT)
Dept: FAMILY MEDICINE | Facility: CLINIC | Age: 70
End: 2025-08-11
Payer: MEDICARE

## 2025-08-11 RX ORDER — INSULIN GLARGINE 100 [IU]/ML
50 INJECTION, SOLUTION SUBCUTANEOUS NIGHTLY
Qty: 15 EACH | Refills: 3 | OUTPATIENT
Start: 2025-08-11

## 2025-08-13 ENCOUNTER — TELEPHONE (OUTPATIENT)
Dept: FAMILY MEDICINE | Facility: CLINIC | Age: 70
End: 2025-08-13
Payer: MEDICARE

## 2025-08-14 ENCOUNTER — TELEPHONE (OUTPATIENT)
Dept: FAMILY MEDICINE | Facility: CLINIC | Age: 70
End: 2025-08-14
Payer: MEDICARE

## 2025-08-25 ENCOUNTER — TELEPHONE (OUTPATIENT)
Dept: FAMILY MEDICINE | Facility: CLINIC | Age: 70
End: 2025-08-25
Payer: MEDICARE

## (undated) DEVICE — TUBING MINIBORE EXTENSION

## (undated) DEVICE — NDL TUOHY EPIDURAL 20G X 3.5

## (undated) DEVICE — CHLORAPREP 10.5 ML APPLICATOR

## (undated) DEVICE — GLOVE SENSICARE PI GRN 7.5

## (undated) DEVICE — SYS LABEL CORRECT MED

## (undated) DEVICE — SYR GLASS 5CC LUER LOK